# Patient Record
Sex: FEMALE | Race: BLACK OR AFRICAN AMERICAN | Employment: UNEMPLOYED | URBAN - METROPOLITAN AREA
[De-identification: names, ages, dates, MRNs, and addresses within clinical notes are randomized per-mention and may not be internally consistent; named-entity substitution may affect disease eponyms.]

---

## 2017-02-15 ENCOUNTER — OFFICE VISIT (OUTPATIENT)
Dept: INTERNAL MEDICINE CLINIC | Age: 52
End: 2017-02-15

## 2017-02-15 ENCOUNTER — HOSPITAL ENCOUNTER (OUTPATIENT)
Dept: LAB | Age: 52
Discharge: HOME | End: 2017-02-15
Payer: MEDICARE

## 2017-02-15 VITALS
HEIGHT: 67 IN | WEIGHT: 185 LBS | HEART RATE: 70 BPM | TEMPERATURE: 98.8 F | OXYGEN SATURATION: 98 % | DIASTOLIC BLOOD PRESSURE: 79 MMHG | RESPIRATION RATE: 18 BRPM | BODY MASS INDEX: 29.03 KG/M2 | SYSTOLIC BLOOD PRESSURE: 115 MMHG

## 2017-02-15 DIAGNOSIS — E11.9 TYPE 2 DIABETES MELLITUS WITHOUT COMPLICATION, WITHOUT LONG-TERM CURRENT USE OF INSULIN (HCC): ICD-10-CM

## 2017-02-15 DIAGNOSIS — F17.200 SMOKER: ICD-10-CM

## 2017-02-15 DIAGNOSIS — Z00.00 MEDICARE ANNUAL WELLNESS VISIT, INITIAL: ICD-10-CM

## 2017-02-15 DIAGNOSIS — R05.9 COUGH: ICD-10-CM

## 2017-02-15 DIAGNOSIS — I10 ESSENTIAL HYPERTENSION, BENIGN: ICD-10-CM

## 2017-02-15 DIAGNOSIS — E11.9 TYPE 2 DIABETES MELLITUS WITHOUT COMPLICATION, WITHOUT LONG-TERM CURRENT USE OF INSULIN (HCC): Primary | ICD-10-CM

## 2017-02-15 LAB
ALBUMIN SERPL BCP-MCNC: 3.5 G/DL (ref 3.4–5)
ALBUMIN/GLOB SERPL: 0.8 {RATIO} (ref 0.8–1.7)
ALP SERPL-CCNC: 82 U/L (ref 45–117)
ALT SERPL-CCNC: 37 U/L (ref 13–56)
ANION GAP BLD CALC-SCNC: 7 MMOL/L (ref 3–18)
AST SERPL W P-5'-P-CCNC: 28 U/L (ref 15–37)
BILIRUB SERPL-MCNC: 0.4 MG/DL (ref 0.2–1)
BUN SERPL-MCNC: 9 MG/DL (ref 7–18)
BUN/CREAT SERPL: 11 (ref 12–20)
CALCIUM SERPL-MCNC: 8.5 MG/DL (ref 8.5–10.1)
CHLORIDE SERPL-SCNC: 101 MMOL/L (ref 100–108)
CHOLEST SERPL-MCNC: 175 MG/DL
CO2 SERPL-SCNC: 32 MMOL/L (ref 21–32)
CREAT SERPL-MCNC: 0.81 MG/DL (ref 0.6–1.3)
EST. AVERAGE GLUCOSE BLD GHB EST-MCNC: 148 MG/DL
GLOBULIN SER CALC-MCNC: 4.3 G/DL (ref 2–4)
GLUCOSE SERPL-MCNC: 70 MG/DL (ref 74–99)
HBA1C MFR BLD: 6.8 % (ref 4.2–5.6)
HDLC SERPL-MCNC: 61 MG/DL (ref 40–60)
HDLC SERPL: 2.9 {RATIO} (ref 0–5)
LDLC SERPL CALC-MCNC: 93.6 MG/DL (ref 0–100)
LIPID PROFILE,FLP: ABNORMAL
POTASSIUM SERPL-SCNC: 3.6 MMOL/L (ref 3.5–5.5)
PROT SERPL-MCNC: 7.8 G/DL (ref 6.4–8.2)
SODIUM SERPL-SCNC: 140 MMOL/L (ref 136–145)
TRIGL SERPL-MCNC: 102 MG/DL (ref ?–150)
VLDLC SERPL CALC-MCNC: 20.4 MG/DL

## 2017-02-15 PROCEDURE — 80053 COMPREHEN METABOLIC PANEL: CPT | Performed by: PHYSICIAN ASSISTANT

## 2017-02-15 PROCEDURE — 80061 LIPID PANEL: CPT | Performed by: PHYSICIAN ASSISTANT

## 2017-02-15 PROCEDURE — 83036 HEMOGLOBIN GLYCOSYLATED A1C: CPT | Performed by: PHYSICIAN ASSISTANT

## 2017-02-15 RX ORDER — MONTELUKAST SODIUM 10 MG/1
TABLET ORAL
Qty: 90 TAB | Refills: 1 | Status: SHIPPED | OUTPATIENT
Start: 2017-02-15 | End: 2017-05-15 | Stop reason: SDUPTHER

## 2017-02-15 RX ORDER — VARENICLINE TARTRATE 0.5 MG/1
0.5 TABLET, FILM COATED ORAL 2 TIMES DAILY
Qty: 60 TAB | Refills: 2 | Status: CANCELLED | OUTPATIENT
Start: 2017-02-15 | End: 2017-05-16

## 2017-02-15 RX ORDER — HYDROCHLOROTHIAZIDE 25 MG/1
TABLET ORAL
Qty: 90 TAB | Refills: 1 | Status: SHIPPED | OUTPATIENT
Start: 2017-02-15 | End: 2017-08-14 | Stop reason: SDUPTHER

## 2017-02-15 RX ORDER — VARENICLINE TARTRATE 1 MG/1
TABLET, FILM COATED ORAL
Qty: 60 TAB | Refills: 1 | Status: SHIPPED | OUTPATIENT
Start: 2017-02-15 | End: 2017-05-15

## 2017-02-15 RX ORDER — LISINOPRIL 5 MG/1
5 TABLET ORAL DAILY
Qty: 90 TAB | Refills: 1 | Status: SHIPPED | OUTPATIENT
Start: 2017-02-15 | End: 2017-08-14 | Stop reason: SDUPTHER

## 2017-02-15 RX ORDER — ALBUTEROL SULFATE 90 UG/1
2 AEROSOL, METERED RESPIRATORY (INHALATION)
Qty: 1 INHALER | Refills: 5 | Status: SHIPPED | OUTPATIENT
Start: 2017-02-15 | End: 2017-08-14 | Stop reason: SDUPTHER

## 2017-02-15 RX ORDER — ESTRADIOL 0.1 MG/G
CREAM VAGINAL
Qty: 42.5 G | Refills: 1 | Status: SHIPPED | OUTPATIENT
Start: 2017-02-15 | End: 2017-03-27 | Stop reason: SDUPTHER

## 2017-02-15 NOTE — PROGRESS NOTES
Chief Complaint   Patient presents with    Annual Wellness Visit    Diabetes    Hypertension   1. Have you been to the ER, urgent care clinic since your last visit? Hospitalized since your last visit? No    2. Have you seen or consulted any other health care providers outside of the [address] [place] [address] since your last visit? Include any pap smears or colon screening.  No

## 2017-02-15 NOTE — PROGRESS NOTES
Ravinder Vitale is a 46 y.o. female and presents for annual Medicare Wellness Visit. Problem List: Reviewed with patient and discussed risk factors. Patient Active Problem List   Diagnosis Code    Essential hypertension, benign I10    Overactive bladder N32.81    Bipolar disorder ([facility] F31.9    Chronic back pain M54.9, G89.29    Glaucoma H40.9    UTI (lower urinary tract infection) N39.0    Diabetes mellitus ([facility] E11.9    Psychotic disorder F29    Hypertension I10    Hepatitis C B19.20    GERD (gastroesophageal reflux disease) K21.9    Diabetes (HCC) E11.9    Depression F32.9    Chronic pain G89.29    Asthma J45.909       Current medical providers:  Patient Care Team:  Tom Lorenz PA-C as PCP - General (Family Practice)  Emanuel Montez RN as Nurse Navigator    PSH: Reviewed with patient  Past Surgical History   Procedure Laterality Date    Hx orthopaedic  2008     [id number]    Hx cholecystectomy      Hx orthopaedic  3/2015     right foot decompression peroneal nerve        SH: Reviewed with patient  Social History   Substance Use Topics    Smoking status: Former Smoker     Packs/day: 1.00     Years: 35.00     Quit date: 4/1/2013    Smokeless tobacco: Never Used    Alcohol use No       FH: Reviewed with patient  Family History   Problem Relation Age of Onset    Cancer Mother     Lung Disease Mother        Medications/Allergies: Reviewed with patient  Current Outpatient Prescriptions on File Prior to Visit   Medication Sig Dispense Refill    fluticasone-vilanterol (BREO ELLIPTA) 100-25 mcg/dose inhaler INHALE ONE PUFF BY MOUTH DAILY 1 Inhaler 5    ABILIFY 30 mg tablet       Lancets misc True results lancets  Test glucose daily 1 Each 11    glucose blood VI test strips (BLOOD GLUCOSE TEST) strip True result  Test blood glucose daily 1 Each 11    escitalopram (LEXAPRO) 20 mg tablet Take 20 mg by mouth daily.         gabapentin (NEURONTIN) 600 mg tablet Take  by mouth two (2) times a day.  diazepam (VALIUM) 10 mg tablet Take 10 mg by mouth every six (6) hours as needed.  tapentadol (NUCYNTA) 100 mg tablet Take 100 mg by mouth every six (6) hours as needed. No current facility-administered medications on file prior to visit. Allergies   Allergen Reactions    Aspirin Hives    Bactrim [Sulfamethoprim Ds] Swelling       Objective:  Visit Vitals    /79 (BP 1 Location: Left arm, BP Patient Position: Sitting)    Pulse 70    Temp 98.8 °F (37.1 °C) (Oral)    Resp 18    Ht 5' 7\" (1.702 m)    Wt 185 lb (83.9 kg)    SpO2 98%    BMI 28.98 kg/m2    Body mass index is 28.98 kg/(m^2). Assessment of cognitive impairment: Alert and oriented x 3    Depression Screen: No flowsheet data found. Fall Risk Assessment:    Fall Risk Assessment, last 12 mths 9/10/2015   Able to walk? Yes   Fall in past 12 months? No       Functional Ability:   Does the patient exhibit a steady gait? yes   How long did it take the patient to get up and walk from a sitting position? <10 sec   Is the patient self reliant?  (ie can do own laundry, meals, household chores)  yes     Does the patient handle his/her own medications? yes     Does the patient handle his/her own money? yes     Is the patients home safe (ie good lighting, handrails on stairs and bath, etc.)? yes     Did you notice or did patient express any hearing difficulties? no     Did you notice or did patient express any vision difficulties?   no     Were distance and reading eye charts used? yes       Advance Care Planning:   Patient was offered the opportunity to discuss advance care planning:  yes     Does patient have an Advance Directive:  no   If no, did you provide information on Caring Connections?   yes       Plan:      Orders Placed This Encounter    LIPID PANEL    METABOLIC PANEL, COMPREHENSIVE    HEMOGLOBIN A1C WITH EAG    OR COLLECTION VENOUS BLOOD,VENIPUNCTURE    hydroCHLOROthiazide (HYDRODIURIL) 25 mg tablet    montelukast (SINGULAIR) 10 mg tablet    estradiol (ESTRACE) 0.01 % (0.1 mg/gram) vaginal cream    albuterol (PROVENTIL HFA, VENTOLIN HFA, PROAIR HFA) 90 mcg/actuation inhaler    lisinopril (PRINIVIL, ZESTRIL) 5 mg tablet    SITagliptin-metFORMIN (JANUMET)  mg per tablet    varenicline (CHANTIX CONTINUING MONTH SUZANNA) 1 mg tablet       Health Maintenance   Topic Date Due    MICROALBUMIN Q1  04/14/2017    EYE EXAM RETINAL OR DILATED Q1  04/26/2017    HEMOGLOBIN A1C Q6M  05/14/2017    LIPID PANEL Q1  11/14/2017    FOOT EXAM Q1  02/15/2018    BREAST CANCER SCRN MAMMOGRAM  09/26/2018    PAP AKA CERVICAL CYTOLOGY  05/19/2019    COLONOSCOPY  05/14/2022    DTaP/Tdap/Td series (2 - Td) 07/14/2026    Pneumococcal 19-64 Medium Risk  Completed    INFLUENZA AGE 9 TO ADULT  Completed       *Patient verbalized understanding and agreement with the plan. A copy of the After Visit Summary with personalized health plan was given to the patient today.

## 2017-02-15 NOTE — MR AVS SNAPSHOT
Visit Information Date & Time Provider Department Dept. Phone Encounter #  
 2/15/2017  9:45 AM Steven Kaplan PA-C [facility] [phone] Your Appointments 3/27/2017  3:30 PM  
ESTABLISHED PATIENT with Katia Medina MD  
[facility] [address]) Appt Note: F/U 6 M FOR UTI EVAL  
 [address]  
  
   
 [address], [address] [place] [address] 5/15/2017 10:00 AM  
Office Visit with Steven Kaplan PA-C [facility] [address]) Appt Note: combo clinic  
 [phone] [facility] [address]  
[phone]  
  
   
 [address] [phone] [facility] [address] Upcoming Health Maintenance Date Due MICROALBUMIN Q1 4/14/2017 EYE EXAM RETINAL OR DILATED Q1 4/26/2017 HEMOGLOBIN A1C Q6M 5/14/2017 LIPID PANEL Q1 11/14/2017 FOOT EXAM Q1 2/15/2018 BREAST CANCER SCRN MAMMOGRAM 9/26/2018 PAP AKA CERVICAL CYTOLOGY 5/19/2019 COLONOSCOPY 5/14/2022 DTaP/Tdap/Td series (2 - Td) 7/14/2026 Allergies as of 2/15/2017  Review Complete On: 2/15/2017 By: Steven Kaplan PA-C Severity Noted Reaction Type Reactions Aspirin  10/25/2012    Hives Bactrim [Sulfamethoprim Ds]  10/25/2012    Swelling Current Immunizations  Never Reviewed Name Date Influenza Vaccine 9/10/2015, 10/16/2014, 11/1/2013 Influenza Vaccine (Quad) PF 11/14/2016 Pneumococcal Polysaccharide (PPSV-23) 11/1/2013 Td, Adsorbed PF 9/10/2015 Zoster Vaccine, Live 7/1/2015 Not reviewed this visit You Were Diagnosed With   
  
 Codes Comments Type 2 diabetes mellitus without complication, without long-term current use of insulin (HCC)    -  Primary ICD-10-CM: E11.9 ICD-9-CM: 250.00 Essential hypertension, benign     ICD-10-CM: I10 
ICD-9-CM: 401.1 Cough     ICD-10-CM: R05 ICD-9-CM: 786.2 Smoker     ICD-10-CM: V07.874 ICD-9-CM: 305.1 Vitals BP Pulse Temp Resp Height(growth percentile) Weight(growth percentile) 115/79 (BP 1 Location: Left arm, BP Patient Position: Sitting) 70 98.8 °F (37.1 °C) (Oral) 18 5' 7\" (1.702 m) 185 lb (83.9 kg) SpO2 BMI OB Status Smoking Status 98% 28.98 kg/m2 Postmenopausal Former Smoker BMI and BSA Data Body Mass Index Body Surface Area  
 28.98 kg/m 2 1.99 m 2 Preferred Pharmacy Pharmacy Name Phone [address] [facility], [address] [place] [address] [phone] Your Updated Medication List  
  
   
This list is accurate as of: 2/15/17  4:09 PM.  Always use your most recent med list.  
  
  
  
  
 ABILIFY 30 mg tablet Generic drug:  ARIPiprazole  
  
 albuterol 90 mcg/actuation inhaler Commonly known as:  PROVENTIL HFA, VENTOLIN HFA, PROAIR HFA Take 2 Puffs by inhalation every six (6) hours as needed for Wheezing. diazePAM 10 mg tablet Commonly known as:  VALIUM Take 10 mg by mouth every six (6) hours as needed. estradiol 0.01 % (0.1 mg/gram) vaginal cream  
Commonly known as:  ESTRACE  
1 finger tip of medication applied to vulva daily  
  
 fluticasone-vilanterol 100-25 mcg/dose inhaler Commonly known as:  BREO ELLIPTA INHALE ONE PUFF BY MOUTH DAILY  
  
 gabapentin 600 mg tablet Commonly known as:  NEURONTIN Take  by mouth two (2) times a day. glucose blood VI test strips strip Commonly known as:  blood glucose test  
True result Test blood glucose daily  
  
 hydroCHLOROthiazide 25 mg tablet Commonly known as:  HYDRODIURIL  
TAKE 1 TABLET BY MOUTH DAILY Lancets Misc True results lancets Test glucose daily LEXAPRO 20 mg tablet Generic drug:  escitalopram oxalate Take 20 mg by mouth daily. lisinopril 5 mg tablet Commonly known as:  Emily Reasons  
 Take 1 Tab by mouth daily. montelukast 10 mg tablet Commonly known as:  SINGULAIR  
TAKE 1 TABLET BY MOUTH DAILY  
  
 NUCYNTA 100 mg tablet Generic drug:  tapentadol Take 100 mg by mouth every six (6) hours as needed. SITagliptin-metFORMIN  mg per tablet Commonly known as:  Gadiel Nieves Take 1 Tab by mouth two (2) times daily (with meals). varenicline 1 mg tablet Commonly known as:  CHANTIX CONTINUING MONTH SUZANNA  
1 tab po bid Prescriptions Sent to Pharmacy Refills  
 hydroCHLOROthiazide (HYDRODIURIL) 25 mg tablet 1 Sig: TAKE 1 TABLET BY MOUTH DAILY Class: Normal  
 Pharmacy: [facility] [address] [place] [address] Ph #: [phone]  
 montelukast (SINGULAIR) 10 mg tablet 1 Sig: TAKE 1 TABLET BY MOUTH DAILY Class: Normal  
 Pharmacy: [facility] [address] [place] [address] Ph #: [phone]  
 estradiol (ESTRACE) 0.01 % (0.1 mg/gram) vaginal cream 1 Si finger tip of medication applied to vulva daily Class: Normal  
 Pharmacy: [facility] [address] [place] [address] Ph #: [phone]  
 albuterol (PROVENTIL HFA, VENTOLIN HFA, PROAIR HFA) 90 mcg/actuation inhaler 5 Sig: Take 2 Puffs by inhalation every six (6) hours as needed for Wheezing. Class: Normal  
 Pharmacy: [facility] [address] [place] [address] Ph #: [phone] Route: Inhalation  
 lisinopril (PRINIVIL, ZESTRIL) 5 mg tablet 1 Sig: Take 1 Tab by mouth daily. Class: Normal  
 Pharmacy: [facility] [place] [address], [address] [place] [address]. [place] [address] Ph #: [phone]  Route: Oral  
 SITagliptin-metFORMIN (JANUMET)  mg per tablet 1  
 Sig: Take 1 Tab by mouth two (2) times daily (with meals). Class: Normal  
 Pharmacy: [facility] [address]  Ph #: [phone] Route: Oral  
 varenicline (CHANTIX CONTINUING MONTH SUZANNA) 1 mg tablet 1 Si tab po bid Class: Normal  
 Pharmacy: [facility] [address], [address] [place]  Ph #: [phone] We Performed the Following [place] COLLECTION VENOUS BLOOD,VENIPUNCTURE [id number] CPT(R)] Introducing [facility] SERVICES! Ruth Willis introduces [employer] patient portal. Now you can access parts of your medical record, email your doctor's office, and request medication refills online. 1. In your internet browser, go to [email or web address] 2. Click on the First Time User? Click Here link in the Sign In box. You will see the New Member Sign Up page. 3. Enter your [employer] Access Code exactly as it appears below. You will not need to use this code after youve completed the sign-up process. If you do not sign up before the expiration date, you must request a new code. · [employer] Access Code: [email or web address] Expires: 2017  4:06 PM 
 
4. Enter the last four digits of your Social Security Number (xxxx) and Date of Birth (mm/dd/yyyy) as indicated and click Submit. You will be taken to the next sign-up page. 5. Create a [employer] ID. This will be your [employer] login ID and cannot be changed, so think of one that is secure and easy to remember. 6. Create a [employer] password. You can change your password at any time. 7. Enter your Password Reset Question and Answer. This can be used at a later time if you forget your password. 8. Enter your e-mail address. You will receive e-mail notification when new information is available in [address] 9. Click Sign Up. You can now view and download portions of your medical record. 10. Click the Download Summary menu link to download a portable copy of your medical information. If you have questions, please visit the Frequently Asked Questions section of the [employer] website. Remember, [employer] is NOT to be used for urgent needs. For medical emergencies, dial 911. Now available from your iPhone and Android! Please provide this summary of care documentation to your next provider. Your primary care clinician is listed as Sue Miller. If you have any questions after today's visit, please call [phone].

## 2017-02-15 NOTE — PROGRESS NOTES
HISTORY OF PRESENT ILLNESS  Marlene Roach is a 46 y.o. female. HPI Marlene Roach is here for follow up on diabetes, HTN and hypercholesterolemia. She does check her sugars and brought a log with her. She denies numbness and tingling of her feet, shortness of breath or chest pain. She has a podiatrist and ophthalmologist .   She did see pulmonology and was advised to quit smoking. She did complete the chantix starter pack a few months ago. She has reduced her smoking, but continues to smoke. She would like the continuing month pack. She states she was told she does not have COPD or emphysema. Review of Systems   Constitutional: Negative. HENT: Negative. Eyes: Negative. Musculoskeletal: Positive for joint pain (right hip, has appt with ortho in early March). Neurological: Negative for tingling. Physical Exam   Constitutional: She is oriented to person, place, and time. She appears well-developed and well-nourished. No distress. HENT:   Head: Normocephalic and atraumatic. Cardiovascular: Normal rate and regular rhythm. No murmur heard. Pulmonary/Chest: Effort normal and breath sounds normal. She has no wheezes. Musculoskeletal: She exhibits no edema. Neurological: She is alert and oriented to person, place, and time. Visit Vitals    /79 (BP 1 Location: Left arm, BP Patient Position: Sitting)    Pulse 70    Temp 98.8 °F (37.1 °C) (Oral)    Resp 18    Ht 5' 7\" (1.702 m)    Wt 185 lb (83.9 kg)    SpO2 98%    BMI 28.98 kg/m2     Wt Readings from Last 3 Encounters:   02/15/17 185 lb (83.9 kg)   11/14/16 183 lb (83 kg)   09/29/16 185 lb (83.9 kg)           ASSESSMENT and PLAN    ICD-10-CM ICD-9-CM    1.  Type 2 diabetes mellitus without complication, without long-term current use of insulin ([facility]) E11.9 250.00 SITagliptin-metFORMIN (JANUMET)  mg per tablet      ME COLLECTION VENOUS BLOOD,VENIPUNCTURE      LIPID PANEL      METABOLIC PANEL, COMPREHENSIVE HEMOGLOBIN A1C WITH EAG   2. Essential hypertension, benign I10 401.1 hydroCHLOROthiazide (HYDRODIURIL) 25 mg tablet      lisinopril (PRINIVIL, ZESTRIL) 5 mg tablet      OR COLLECTION VENOUS BLOOD,VENIPUNCTURE      LIPID PANEL      METABOLIC PANEL, COMPREHENSIVE   3. Cough R05 786.2 montelukast (SINGULAIR) 10 mg tablet      albuterol (PROVENTIL HFA, VENTOLIN HFA, PROAIR HFA) 90 mcg/actuation inhaler   4. Smoker F17.200 305.1 varenicline (CHANTIX CONTINUING MONTH SUZANNA) 1 mg tablet     Pt verbalized understanding of their condition and diagnoses, treatment plan,  as well as side effects of any new medications prescribed.

## 2017-03-27 PROBLEM — N39.0 RECURRENT UTI: Status: ACTIVE | Noted: 2017-03-27

## 2017-04-19 ENCOUNTER — TELEPHONE (OUTPATIENT)
Dept: INTERNAL MEDICINE CLINIC | Age: 52
End: 2017-04-19

## 2017-04-19 NOTE — TELEPHONE ENCOUNTER
Mrs Shayan Leon called to see if it would be ok for her to take [employer] supplement for Hail, Nails & Skin. She said her hair is thinning, nails are splitting and the skin/cuticles are dry and cracking. She just wants to make sure this will work with her medications she is currently taking.

## 2017-04-21 ENCOUNTER — TELEPHONE (OUTPATIENT)
Dept: INTERNAL MEDICINE CLINIC | Age: 52
End: 2017-04-21

## 2017-05-15 ENCOUNTER — TELEPHONE (OUTPATIENT)
Dept: INTERNAL MEDICINE CLINIC | Age: 52
End: 2017-05-15

## 2017-05-15 ENCOUNTER — HOSPITAL ENCOUNTER (OUTPATIENT)
Dept: LAB | Age: 52
Discharge: HOME | End: 2017-05-15
Payer: MEDICARE

## 2017-05-15 ENCOUNTER — OFFICE VISIT (OUTPATIENT)
Dept: INTERNAL MEDICINE CLINIC | Age: 52
End: 2017-05-15

## 2017-05-15 VITALS
WEIGHT: 187 LBS | HEART RATE: 72 BPM | RESPIRATION RATE: 18 BRPM | HEIGHT: 67 IN | OXYGEN SATURATION: 97 % | DIASTOLIC BLOOD PRESSURE: 81 MMHG | TEMPERATURE: 97.9 F | BODY MASS INDEX: 29.35 KG/M2 | SYSTOLIC BLOOD PRESSURE: 113 MMHG

## 2017-05-15 DIAGNOSIS — E87.6 DIURETIC-INDUCED HYPOKALEMIA: Primary | ICD-10-CM

## 2017-05-15 DIAGNOSIS — I10 ESSENTIAL HYPERTENSION, BENIGN: ICD-10-CM

## 2017-05-15 DIAGNOSIS — R05.9 COUGH: ICD-10-CM

## 2017-05-15 DIAGNOSIS — T50.2X5A DIURETIC-INDUCED HYPOKALEMIA: Primary | ICD-10-CM

## 2017-05-15 DIAGNOSIS — G89.29 CHRONIC PAIN OF RIGHT KNEE: ICD-10-CM

## 2017-05-15 DIAGNOSIS — E11.9 TYPE 2 DIABETES MELLITUS WITHOUT COMPLICATION, WITHOUT LONG-TERM CURRENT USE OF INSULIN (HCC): ICD-10-CM

## 2017-05-15 DIAGNOSIS — E11.9 TYPE 2 DIABETES MELLITUS WITHOUT COMPLICATION, WITHOUT LONG-TERM CURRENT USE OF INSULIN (HCC): Primary | ICD-10-CM

## 2017-05-15 DIAGNOSIS — R82.998 LEUKOCYTES IN URINE: ICD-10-CM

## 2017-05-15 DIAGNOSIS — M25.561 CHRONIC PAIN OF RIGHT KNEE: ICD-10-CM

## 2017-05-15 LAB
ALBUMIN SERPL BCP-MCNC: 3.5 G/DL (ref 3.4–5)
ALBUMIN/GLOB SERPL: 0.8 {RATIO} (ref 0.8–1.7)
ALP SERPL-CCNC: 82 U/L (ref 45–117)
ALT SERPL-CCNC: 29 U/L (ref 13–56)
ANION GAP BLD CALC-SCNC: 7 MMOL/L (ref 3–18)
AST SERPL W P-5'-P-CCNC: 29 U/L (ref 15–37)
BILIRUB SERPL-MCNC: 0.4 MG/DL (ref 0.2–1)
BILIRUB UR QL STRIP: NEGATIVE
BUN SERPL-MCNC: 8 MG/DL (ref 7–18)
BUN/CREAT SERPL: 10 (ref 12–20)
CALCIUM SERPL-MCNC: 9.1 MG/DL (ref 8.5–10.1)
CHLORIDE SERPL-SCNC: 101 MMOL/L (ref 100–108)
CHOLEST SERPL-MCNC: 168 MG/DL
CO2 SERPL-SCNC: 30 MMOL/L (ref 21–32)
CREAT SERPL-MCNC: 0.81 MG/DL (ref 0.6–1.3)
CREATININE(CRT),U, 723280: 100 MG/DL
EST. AVERAGE GLUCOSE BLD GHB EST-MCNC: 154 MG/DL
GLOBULIN SER CALC-MCNC: 4.4 G/DL (ref 2–4)
GLUCOSE SERPL-MCNC: 85 MG/DL (ref 74–99)
GLUCOSE UR-MCNC: NORMAL MG/DL
HBA1C MFR BLD: 7 % (ref 4.2–5.6)
HDLC SERPL-MCNC: 55 MG/DL (ref 40–60)
HDLC SERPL: 3.1 {RATIO} (ref 0–5)
KETONES P FAST UR STRIP-MCNC: NEGATIVE MG/DL
LDLC SERPL CALC-MCNC: 93 MG/DL (ref 0–100)
LIPID PROFILE,FLP: NORMAL
MICROALBUMIN UR TEST STR-MCNC: 10 MG/L
MICROALBUMIN/CREAT RATIO POC: <30 MG/G
PH UR STRIP: 7 [PH] (ref 4.6–8)
POTASSIUM SERPL-SCNC: 3.3 MMOL/L (ref 3.5–5.5)
PROT SERPL-MCNC: 7.9 G/DL (ref 6.4–8.2)
PROT UR QL STRIP: NEGATIVE MG/DL
SODIUM SERPL-SCNC: 138 MMOL/L (ref 136–145)
SP GR UR STRIP: 1.02 (ref 1–1.03)
TRIGL SERPL-MCNC: 100 MG/DL (ref ?–150)
UA UROBILINOGEN AMB POC: NORMAL (ref 0.2–1)
URINALYSIS CLARITY POC: CLEAR
URINALYSIS COLOR POC: YELLOW
URINE BLOOD POC: NEGATIVE
URINE LEUKOCYTES POC: NEGATIVE
URINE NITRITES POC: POSITIVE
VLDLC SERPL CALC-MCNC: 20 MG/DL

## 2017-05-15 PROCEDURE — 83036 HEMOGLOBIN GLYCOSYLATED A1C: CPT | Performed by: PHYSICIAN ASSISTANT

## 2017-05-15 PROCEDURE — 87077 CULTURE AEROBIC IDENTIFY: CPT | Performed by: PHYSICIAN ASSISTANT

## 2017-05-15 PROCEDURE — 87086 URINE CULTURE/COLONY COUNT: CPT | Performed by: PHYSICIAN ASSISTANT

## 2017-05-15 PROCEDURE — 87186 SC STD MICRODIL/AGAR DIL: CPT | Performed by: PHYSICIAN ASSISTANT

## 2017-05-15 PROCEDURE — 80061 LIPID PANEL: CPT | Performed by: PHYSICIAN ASSISTANT

## 2017-05-15 PROCEDURE — 80053 COMPREHEN METABOLIC PANEL: CPT | Performed by: PHYSICIAN ASSISTANT

## 2017-05-15 RX ORDER — POTASSIUM CHLORIDE 750 MG/1
10 TABLET, EXTENDED RELEASE ORAL DAILY
Qty: 90 TAB | Refills: 1 | Status: SHIPPED | OUTPATIENT
Start: 2017-05-15 | End: 2017-08-14 | Stop reason: SDUPTHER

## 2017-05-15 RX ORDER — MONTELUKAST SODIUM 10 MG/1
TABLET ORAL
Qty: 90 TAB | Refills: 1 | Status: SHIPPED | OUTPATIENT
Start: 2017-05-15 | End: 2017-08-14 | Stop reason: SDUPTHER

## 2017-05-15 NOTE — PROGRESS NOTES
Brett Ivory presents today at the clinic for     Chief Complaint   Patient presents with    Diabetes    Labs    Hypertension        Wt Readings from Last 3 Encounters:   05/15/17 187 lb (84.8 kg)   03/27/17 185 lb (83.9 kg)   02/15/17 185 lb (83.9 kg)     Temp Readings from Last 3 Encounters:   05/15/17 97.9 °F (36.6 °C) (Oral)   04/27/17 98 °F (36.7 °C)   02/15/17 98.8 °F (37.1 °C) (Oral)     BP Readings from Last 3 Encounters:   05/15/17 113/81   04/27/17 116/77   03/27/17 130/80     Pulse Readings from Last 3 Encounters:   05/15/17 72   04/27/17 74   02/15/17 70       There are no preventive care reminders to display for this patient. Coordination of Care:    1. Have you been to the ER, urgent care clinic since your last visit? Hospitalized since your last visit? No    2. Have you seen or consulted any other health care providers outside of the [facility] since your last visit? Include any pap smears or colon screening.  No

## 2017-05-15 NOTE — MR AVS SNAPSHOT
Visit Information Date & Time Provider Department Dept. Phone Encounter #  
 5/15/2017 10:00 AM Og Friedman PA-C [facility] [phone] [id number] Follow-up Instructions Return in about 3 months (around 8/15/2017) for Combo. Your Appointments 3/27/2018  1:20 PM  
ESTABLISHED PATIENT with THEO Matthews [facility] ([address]) Appt Note: Return for PA f/u in 1 year. [address] [facility]  
  
   
 [address] [facility], [address] [place] [address] Upcoming Health Maintenance Date Due  
 EYE EXAM RETINAL OR DILATED Q1 5/31/2017* INFLUENZA AGE 9 TO ADULT 8/1/2017 HEMOGLOBIN A1C Q6M 8/15/2017 FOOT EXAM Q1 2/15/2018 LIPID PANEL Q1 2/15/2018 MEDICARE YEARLY EXAM 2/16/2018 MICROALBUMIN Q1 5/15/2018 BREAST CANCER SCRN MAMMOGRAM 9/26/2018 PAP AKA CERVICAL CYTOLOGY 5/19/2019 COLONOSCOPY 5/14/2022 DTaP/Tdap/Td series (2 - Td) 7/14/2026 *Topic was postponed. The date shown is not the original due date. Allergies as of 5/15/2017  Review Complete On: 5/15/2017 By: Og Friedman PA-C Severity Noted Reaction Type Reactions Aspirin  10/25/2012    Hives Bactrim [Sulfamethoprim Ds]  10/25/2012    Swelling Current Immunizations  Never Reviewed Name Date Influenza Vaccine 9/10/2015, 10/16/2014, 11/1/2013 Influenza Vaccine (Quad) PF 11/14/2016 Pneumococcal Polysaccharide (PPSV-23) 11/1/2013 Td, Adsorbed PF 9/10/2015 Zoster Vaccine, Live 7/1/2015 Not reviewed this visit You Were Diagnosed With   
  
 Codes Comments Type 2 diabetes mellitus without complication, without long-term current use of insulin (HCC)    -  Primary ICD-10-CM: E11.9 ICD-9-CM: 250.00 Essential hypertension, benign     ICD-10-CM: I10 
ICD-9-CM: 401.1 Cough     ICD-10-CM: R05 ICD-9-CM: 452. 2 Vitals BP Pulse Temp Resp Height(growth percentile) Weight(growth percentile) 113/81 (BP 1 Location: Left arm, BP Patient Position: Sitting) 72 97.9 °F (36.6 °C) (Oral) 18 5' 7\" (1.702 m) 187 lb (84.8 kg) SpO2 BMI OB Status Smoking Status 97% 29.29 kg/m2 Postmenopausal Former Smoker Vitals History BMI and BSA Data Body Mass Index Body Surface Area  
 29.29 kg/m 2 2 m 2 Preferred Pharmacy Pharmacy Name Phone [address], [address] [place], [place] [address] [phone] Your Updated Medication List  
  
   
This list is accurate as of: 5/15/17 10:04 AM.  Always use your most recent med list.  
  
  
  
  
 ABILIFY 30 mg tablet Generic drug:  ARIPiprazole  
  
 albuterol 90 mcg/actuation inhaler Commonly known as:  PROVENTIL HFA, VENTOLIN HFA, PROAIR HFA Take 2 Puffs by inhalation every six (6) hours as needed for Wheezing. diazePAM 10 mg tablet Commonly known as:  VALIUM Take 10 mg by mouth every six (6) hours as needed. estradiol 0.01 % (0.1 mg/gram) vaginal cream  
Commonly known as:  ESTRACE Apply fingertip of estrogen cream to vaginal area daily  
  
 fluticasone-vilanterol 100-25 mcg/dose inhaler Commonly known as:  BREO ELLIPTA INHALE ONE PUFF BY MOUTH DAILY  
  
 gabapentin 600 mg tablet Commonly known as:  NEURONTIN Take  by mouth two (2) times a day. glucose blood VI test strips strip Commonly known as:  blood glucose test  
True result Test blood glucose daily  
  
 hydroCHLOROthiazide 25 mg tablet Commonly known as:  HYDRODIURIL  
TAKE 1 TABLET BY MOUTH DAILY Lancets Misc True results lancets Test glucose daily LEXAPRO 20 mg tablet Generic drug:  escitalopram oxalate Take 20 mg by mouth daily. lisinopril 5 mg tablet Commonly known as:  Marge Gear Take 1 Tab by mouth daily. montelukast 10 mg tablet Commonly known as:  SINGULAIR  
TAKE 1 TABLET BY MOUTH DAILY  
  
 NUCYNTA 100 mg tablet Generic drug:  tapentadol Take 100 mg by mouth every six (6) hours as needed. SITagliptin-metFORMIN  mg per tablet Commonly known as:  Hansa [place] [employer] Take 1 Tab by mouth two (2) times daily (with meals). Prescriptions Sent to Pharmacy Refills  
 montelukast (SINGULAIR) 10 mg tablet 1 Sig: TAKE 1 TABLET BY MOUTH DAILY Class: Normal  
 Pharmacy: [facility], [address] #: [phone] We Performed the Following AMB POC URINALYSIS DIP STICK AUTO W/O MICRO [35066 CPT(R)] AMB POC URINE, MICROALBUMIN, SEMIQUANTITATIVE [30232 CPT(R)] NV COLLECTION VENOUS BLOOD,VENIPUNCTURE [id number] CPT(R)] Follow-up Instructions Return in about 3 months (around 8/15/2017) for Combo. To-Do List   
 05/15/2017 Lab:  HEMOGLOBIN A1C WITH EAG   
  
 05/15/2017 Lab:  LIPID PANEL   
  
 05/15/2017 Lab:  METABOLIC PANEL, COMPREHENSIVE Introducing [facility]! [facility] introduces [employer] patient portal. Now you can access parts of your medical record, email your doctor's office, and request medication refills online. 1. In your internet browser, go to [email or web address] 2. Click on the First Time User? Click Here link in the Sign In box. You will see the New Member Sign Up page. 3. Enter your [employer] Access Code exactly as it appears below. You will not need to use this code after youve completed the sign-up process. If you do not sign up before the expiration date, you must request a new code. · [employer] Access Code: [email or web address] Expires: 5/16/2017  5:06 PM 
 
4. Enter the last four digits of your Social Security Number (xxxx) and Date of Birth (mm/dd/yyyy) as indicated and click Submit. You will be taken to the next sign-up page. 5. Create a [employer] ID. This will be your [employer] login ID and cannot be changed, so think of one that is secure and easy to remember. 6. Create a [employer] password. You can change your password at any time. 7. Enter your Password Reset Question and Answer. This can be used at a later time if you forget your password. 8. Enter your e-mail address. You will receive e-mail notification when new information is available in [address] 9. Click Sign Up. You can now view and download portions of your medical record. 10. Click the Download Summary menu link to download a portable copy of your medical information. If you have questions, please visit the Frequently Asked Questions section of the [employer] website. Remember, [employer] is NOT to be used for urgent needs. For medical emergencies, dial 911. Now available from your iPhone and Android! Please provide this summary of care documentation to your next provider. Your primary care clinician is listed as Maureen Patel. If you have any questions after today's visit, please call [phone].

## 2017-05-15 NOTE — TELEPHONE ENCOUNTER
Spoke with Ms. Jemima. Advised her that her potassium is low. Will send in potassium. A1c is up slightly from 6.8 to 7.0. Advised to watch weight, try to increase physical activity and continue current medications.

## 2017-05-17 LAB
BACTERIA SPEC CULT: ABNORMAL
SERVICE CMNT-IMP: ABNORMAL

## 2017-05-18 ENCOUNTER — TELEPHONE (OUTPATIENT)
Dept: INTERNAL MEDICINE CLINIC | Age: 52
End: 2017-05-18

## 2017-05-18 DIAGNOSIS — N39.0 URINARY TRACT INFECTION WITHOUT HEMATURIA, SITE UNSPECIFIED: Primary | ICD-10-CM

## 2017-05-18 RX ORDER — NITROFURANTOIN 25; 75 MG/1; MG/1
100 CAPSULE ORAL 2 TIMES DAILY
Qty: 14 CAP | Refills: 0 | Status: SHIPPED | OUTPATIENT
Start: 2017-05-18 | End: 2017-08-14

## 2017-08-14 ENCOUNTER — OFFICE VISIT (OUTPATIENT)
Dept: INTERNAL MEDICINE CLINIC | Age: 52
End: 2017-08-14

## 2017-08-14 ENCOUNTER — HOSPITAL ENCOUNTER (OUTPATIENT)
Dept: LAB | Age: 52
Discharge: HOME | End: 2017-08-14
Payer: MEDICARE

## 2017-08-14 VITALS
HEART RATE: 70 BPM | HEIGHT: 67 IN | OXYGEN SATURATION: 99 % | SYSTOLIC BLOOD PRESSURE: 129 MMHG | TEMPERATURE: 98.5 F | RESPIRATION RATE: 18 BRPM | DIASTOLIC BLOOD PRESSURE: 85 MMHG | BODY MASS INDEX: 29.19 KG/M2 | WEIGHT: 186 LBS

## 2017-08-14 DIAGNOSIS — I10 ESSENTIAL HYPERTENSION, BENIGN: ICD-10-CM

## 2017-08-14 DIAGNOSIS — E87.6 HYPOKALEMIA: ICD-10-CM

## 2017-08-14 DIAGNOSIS — R05.9 COUGH: ICD-10-CM

## 2017-08-14 DIAGNOSIS — E11.9 TYPE 2 DIABETES MELLITUS WITHOUT COMPLICATION, WITHOUT LONG-TERM CURRENT USE OF INSULIN (HCC): ICD-10-CM

## 2017-08-14 DIAGNOSIS — E11.9 TYPE 2 DIABETES MELLITUS WITHOUT COMPLICATION, WITHOUT LONG-TERM CURRENT USE OF INSULIN (HCC): Primary | ICD-10-CM

## 2017-08-14 LAB
ALBUMIN SERPL BCP-MCNC: 3.4 G/DL (ref 3.4–5)
ALBUMIN/GLOB SERPL: 0.8 {RATIO} (ref 0.8–1.7)
ALP SERPL-CCNC: 86 U/L (ref 45–117)
ALT SERPL-CCNC: 29 U/L (ref 13–56)
ANION GAP BLD CALC-SCNC: 7 MMOL/L (ref 3–18)
AST SERPL W P-5'-P-CCNC: 24 U/L (ref 15–37)
BILIRUB SERPL-MCNC: 0.5 MG/DL (ref 0.2–1)
BUN SERPL-MCNC: 7 MG/DL (ref 7–18)
BUN/CREAT SERPL: 9 (ref 12–20)
CALCIUM SERPL-MCNC: 9 MG/DL (ref 8.5–10.1)
CHLORIDE SERPL-SCNC: 103 MMOL/L (ref 100–108)
CHOLEST SERPL-MCNC: 170 MG/DL
CO2 SERPL-SCNC: 30 MMOL/L (ref 21–32)
CREAT SERPL-MCNC: 0.77 MG/DL (ref 0.6–1.3)
EST. AVERAGE GLUCOSE BLD GHB EST-MCNC: 148 MG/DL
GLOBULIN SER CALC-MCNC: 4.5 G/DL (ref 2–4)
GLUCOSE SERPL-MCNC: 97 MG/DL (ref 74–99)
HBA1C MFR BLD: 6.8 % (ref 4.2–5.6)
HDLC SERPL-MCNC: 55 MG/DL (ref 40–60)
HDLC SERPL: 3.1 {RATIO} (ref 0–5)
LDLC SERPL CALC-MCNC: 95.6 MG/DL (ref 0–100)
LIPID PROFILE,FLP: NORMAL
POTASSIUM SERPL-SCNC: 3.9 MMOL/L (ref 3.5–5.5)
PROT SERPL-MCNC: 7.9 G/DL (ref 6.4–8.2)
SODIUM SERPL-SCNC: 140 MMOL/L (ref 136–145)
TRIGL SERPL-MCNC: 97 MG/DL (ref ?–150)
VLDLC SERPL CALC-MCNC: 19.4 MG/DL

## 2017-08-14 PROCEDURE — 83036 HEMOGLOBIN GLYCOSYLATED A1C: CPT | Performed by: PHYSICIAN ASSISTANT

## 2017-08-14 PROCEDURE — 80053 COMPREHEN METABOLIC PANEL: CPT | Performed by: PHYSICIAN ASSISTANT

## 2017-08-14 PROCEDURE — 80061 LIPID PANEL: CPT | Performed by: PHYSICIAN ASSISTANT

## 2017-08-14 RX ORDER — LANCETS
EACH MISCELLANEOUS
Qty: 100 EACH | Refills: 11 | Status: SHIPPED | OUTPATIENT
Start: 2017-08-14 | End: 2017-11-15 | Stop reason: SDUPTHER

## 2017-08-14 RX ORDER — FLUTICASONE FUROATE AND VILANTEROL 100; 25 UG/1; UG/1
POWDER RESPIRATORY (INHALATION)
Qty: 1 INHALER | Refills: 5 | Status: SHIPPED | OUTPATIENT
Start: 2017-08-14 | End: 2017-11-15 | Stop reason: SDUPTHER

## 2017-08-14 RX ORDER — MONTELUKAST SODIUM 10 MG/1
TABLET ORAL
Qty: 90 TAB | Refills: 1 | Status: SHIPPED | OUTPATIENT
Start: 2017-08-14 | End: 2017-11-03 | Stop reason: SDUPTHER

## 2017-08-14 RX ORDER — LISINOPRIL 5 MG/1
5 TABLET ORAL DAILY
Qty: 90 TAB | Refills: 1 | Status: SHIPPED | OUTPATIENT
Start: 2017-08-14 | End: 2017-11-15 | Stop reason: SDUPTHER

## 2017-08-14 RX ORDER — HYDROCHLOROTHIAZIDE 25 MG/1
TABLET ORAL
Qty: 90 TAB | Refills: 1 | Status: SHIPPED | OUTPATIENT
Start: 2017-08-14 | End: 2017-11-15 | Stop reason: SDUPTHER

## 2017-08-14 RX ORDER — ALBUTEROL SULFATE 90 UG/1
2 AEROSOL, METERED RESPIRATORY (INHALATION)
Qty: 1 INHALER | Refills: 5 | Status: SHIPPED | OUTPATIENT
Start: 2017-08-14 | End: 2017-11-15 | Stop reason: SDUPTHER

## 2017-08-14 RX ORDER — POTASSIUM CHLORIDE 750 MG/1
10 TABLET, EXTENDED RELEASE ORAL DAILY
Qty: 90 TAB | Refills: 1 | Status: SHIPPED | OUTPATIENT
Start: 2017-08-14 | End: 2017-11-15 | Stop reason: SDUPTHER

## 2017-08-14 NOTE — MR AVS SNAPSHOT
Visit Information Date & Time Provider Department Dept. Phone Encounter #  
 8/14/2017 10:00 AM Rustam Baldwin PA-C [facility] [phone] [id number] Follow-up Instructions Return in about 3 months (around 11/14/2017) for Combo. Your Appointments 3/27/2018  1:20 PM  
ESTABLISHED PATIENT with [facility] ([facility]) Appt Note: Return for PA f/u in 1 year. [address] [facility], [address]  
  
   
 [address] [place], [address] [facility] [address] Upcoming Health Maintenance Date Due INFLUENZA AGE 9 TO ADULT 8/15/2017* HEMOGLOBIN A1C Q6M 11/15/2017 FOOT EXAM Q1 2/15/2018 MEDICARE YEARLY EXAM 2/16/2018 EYE EXAM RETINAL OR DILATED Q1 4/10/2018 MICROALBUMIN Q1 5/15/2018 LIPID PANEL Q1 5/15/2018 BREAST CANCER SCRN MAMMOGRAM 9/26/2018 PAP AKA CERVICAL CYTOLOGY 5/19/2019 COLONOSCOPY 5/14/2022 DTaP/Tdap/Td series (2 - Td) 7/14/2026 *Topic was postponed. The date shown is not the original due date. Allergies as of 8/14/2017  Review Complete On: 8/14/2017 By: Lei Denton Severity Noted Reaction Type Reactions Aspirin  10/25/2012    Hives Bactrim [Sulfamethoprim Ds]  10/25/2012    Swelling Current Immunizations  Never Reviewed Name Date Influenza Vaccine 9/10/2015, 10/16/2014, 11/1/2013 Influenza Vaccine (Quad) PF 11/14/2016 Pneumococcal Polysaccharide (PPSV-23) 11/1/2013 Td, Adsorbed PF 9/10/2015 Zoster Vaccine, Live 7/1/2015 Not reviewed this visit You Were Diagnosed With   
  
 Codes Comments Type 2 diabetes mellitus without complication, without long-term current use of insulin (HCC)    -  Primary ICD-10-CM: E11.9 ICD-9-CM: 250.00 Cough     ICD-10-CM: R05 ICD-9-CM: 786.2 Essential hypertension, benign     ICD-10-CM: I10 
ICD-9-CM: 401.1 Hypokalemia     ICD-10-CM: E87.6 ICD-9-CM: 276.8 Vitals BP Pulse Temp Resp Height(growth percentile) Weight(growth percentile) 129/85 (BP 1 Location: Left arm, BP Patient Position: Sitting) 70 98.5 °F (36.9 °C) (Oral) 18 5' 7\" (1.702 m) 186 lb (84.4 kg) SpO2 BMI OB Status Smoking Status 99% 29.13 kg/m2 Postmenopausal Current Every Day Smoker Vitals History BMI and BSA Data Body Mass Index Body Surface Area  
 29.13 kg/m 2 2 m 2 Preferred Pharmacy Pharmacy Name Phone [address], [address] [place] [address] [phone] Your Updated Medication List  
  
   
This list is accurate as of: 8/14/17 10:09 AM.  Always use your most recent med list.  
  
  
  
  
 ABILIFY 30 mg tablet Generic drug:  ARIPiprazole  
  
 albuterol 90 mcg/actuation inhaler Commonly known as:  PROVENTIL HFA, VENTOLIN HFA, PROAIR HFA Take 2 Puffs by inhalation every six (6) hours as needed for Wheezing. diazePAM 10 mg tablet Commonly known as:  VALIUM Take 10 mg by mouth every six (6) hours as needed. estradiol 0.01 % (0.1 mg/gram) vaginal cream  
Commonly known as:  ESTRACE Apply fingertip of estrogen cream to vaginal area daily  
  
 fluticasone-vilanterol 100-25 mcg/dose inhaler Commonly known as:  BREO ELLIPTA INHALE ONE PUFF BY MOUTH DAILY  
  
 gabapentin 600 mg tablet Commonly known as:  NEURONTIN Take  by mouth two (2) times a day. glucose blood VI test strips strip Commonly known as:  blood glucose test  
True result Test blood glucose daily  
  
 hydroCHLOROthiazide 25 mg tablet Commonly known as:  HYDRODIURIL  
TAKE 1 TABLET BY MOUTH DAILY Lancets Misc True results lancets Test glucose daily LEXAPRO 20 mg tablet Generic drug:  escitalopram oxalate Take 20 mg by mouth daily. lisinopril 5 mg tablet Commonly known as:  Ronnie Mart  
 Take 1 Tab by mouth daily. montelukast 10 mg tablet Commonly known as:  SINGULAIR  
TAKE 1 TABLET BY MOUTH DAILY  
  
 NUCYNTA 100 mg tablet Generic drug:  tapentadol Take 100 mg by mouth every six (6) hours as needed. potassium chloride 10 mEq tablet Commonly known as:  KLOR-CON Take 1 Tab by mouth daily. SITagliptin-metFORMIN  mg per tablet Commonly known as:  Adine Lowers Take 1 Tab by mouth two (2) times daily (with meals). Prescriptions Printed Refills Lancets misc 11 Sig: True results lancets Test glucose daily Class: Print  
 glucose blood VI test strips (BLOOD GLUCOSE TEST) strip 1 Sig: True result Test blood glucose daily Class: Print Prescriptions Sent to Pharmacy Refills  
 montelukast (SINGULAIR) 10 mg tablet 1 Sig: TAKE 1 TABLET BY MOUTH DAILY Class: Normal  
 Pharmacy: [facility] [address]. [place] [address] Ph #: [phone]  
 hydroCHLOROthiazide (HYDRODIURIL) 25 mg tablet 1 Sig: TAKE 1 TABLET BY MOUTH DAILY Class: Normal  
 Pharmacy: [facility] [address]. [place] [address] Ph #: [phone]  
 albuterol (PROVENTIL HFA, VENTOLIN HFA, PROAIR HFA) 90 mcg/actuation inhaler 5 Sig: Take 2 Puffs by inhalation every six (6) hours as needed for Wheezing. Class: Normal  
 Pharmacy: [facility] [address]. [place] [address] Ph #: [phone] Route: Inhalation  
 lisinopril (PRINIVIL, ZESTRIL) 5 mg tablet 1 Sig: Take 1 Tab by mouth daily. Class: Normal  
 Pharmacy: [facility] [address]. [place] [address] Ph #: [phone]  Route: Oral  
 SITagliptin-metFORMIN (JANUMET)  mg per tablet 1  
 Sig: Take 1 Tab by mouth two (2) times daily (with meals). Class: Normal  
 Pharmacy: [facility] [facility]. [address] #: [phone] Route: Oral  
 fluticasone-vilanterol (BREO ELLIPTA) 100-25 mcg/dose inhaler 5 Sig: INHALE ONE PUFF BY MOUTH DAILY Class: Normal  
 Pharmacy: [facility] [address] [facility]. [place] [address] Ph #: [phone]  
 potassium chloride (KLOR-CON) 10 mEq tablet 1 Sig: Take 1 Tab by mouth daily. Class: Normal  
 Pharmacy: [facility] [facility]  Ph #: [phone] Route: Oral  
  
We Performed the Following [place] COLLECTION VENOUS BLOOD,VENIPUNCTURE [id number] CPT(R)] Follow-up Instructions Return in about 3 months (around 11/14/2017) for Combo. To-Do List   
 08/14/2017 Lab:  HEMOGLOBIN A1C WITH EAG   
  
 08/14/2017 Lab:  LIPID PANEL   
  
 08/14/2017 Lab:  METABOLIC PANEL, COMPREHENSIVE Introducing \[facility]\"" & HEALTH SERVICES! Joel Oneill introduces [employer] patient portal. Now you can access parts of your medical record, email your doctor's office, and request medication refills online. 1. In your internet browser, go to [email or web address] 2. Click on the First Time User? Click Here link in the Sign In box. You will see the New Member Sign Up page. 3. Enter your [employer] Access Code exactly as it appears below. You will not need to use this code after youve completed the sign-up process. If you do not sign up before the expiration date, you must request a new code. · [employer] Access Code: [email or web address] Expires: 11/12/2017 10:09 AM 
 
4. Enter the last four digits of your Social Security Number (xxxx) and Date of Birth (mm/dd/yyyy) as indicated and click Submit.  You will be taken to the next sign-up page. 5. Create a [employer] ID. This will be your [employer] login ID and cannot be changed, so think of one that is secure and easy to remember. 6. Create a [employer] password. You can change your password at any time. 7. Enter your Password Reset Question and Answer. This can be used at a later time if you forget your password. 8. Enter your e-mail address. You will receive e-mail notification when new information is available in [address] 9. Click Sign Up. You can now view and download portions of your medical record. 10. Click the Download Summary menu link to download a portable copy of your medical information. If you have questions, please visit the Frequently Asked Questions section of the [employer] website. Remember, [employer] is NOT to be used for urgent needs. For medical emergencies, dial 911. Now available from your iPhone and Android! Please provide this summary of care documentation to your next provider. Your primary care clinician is listed as Bjorn Estrada. If you have any questions after today's visit, please call [phone].

## 2017-08-14 NOTE — PROGRESS NOTES
HISTORY OF PRESENT ILLNESS  Jacobo Diallo is a 46 y.o. female. HPI Jacobo Diallo is here for follow up on diabetes, HTN and hypercholesterolemia. She states she is doing well. She states she has been diagnoses with emphysema by pulmonology. On the Ct of her chest is showed an adrenal adenoma and was recommended to follow up in 12 month. Review of Systems   HENT: Negative. Eyes: Negative. Cardiovascular: Negative. Gastrointestinal: Negative. Musculoskeletal: Positive for back pain, joint pain and myalgias. Sees pain management   Neurological: Negative for dizziness and tingling. Physical Exam   Constitutional: She appears well-developed and well-nourished. No distress. HENT:   Head: Normocephalic and atraumatic. Eyes: Conjunctivae are normal.   Cardiovascular: Normal rate and regular rhythm. No murmur heard. Pulmonary/Chest: Effort normal. She has no wheezes. Musculoskeletal: She exhibits no edema. Neurological: She is alert. Visit Vitals    /85 (BP 1 Location: Left arm, BP Patient Position: Sitting)    Pulse 70    Temp 98.5 °F (36.9 °C) (Oral)    Resp 18    Ht 5' 7\" (1.702 m)    Wt 186 lb (84.4 kg)    SpO2 99%    BMI 29.13 kg/m2       ASSESSMENT and PLAN    ICD-10-CM ICD-9-CM    1. Type 2 diabetes mellitus without complication, without long-term current use of insulin ([facility]) E11.9 250.00 GA COLLECTION VENOUS BLOOD,VENIPUNCTURE      METABOLIC PANEL, COMPREHENSIVE      LIPID PANEL      HEMOGLOBIN A1C WITH EAG      SITagliptin-metFORMIN (JANUMET)  mg per tablet      Lancets misc      glucose blood VI test strips (BLOOD GLUCOSE TEST) strip   2. Cough R05 786.2 montelukast (SINGULAIR) 10 mg tablet      albuterol (PROVENTIL HFA, VENTOLIN HFA, PROAIR HFA) 90 mcg/actuation inhaler      fluticasone-vilanterol (BREO ELLIPTA) 100-25 mcg/dose inhaler   3.  Essential hypertension, benign I10 401.1 hydroCHLOROthiazide (HYDRODIURIL) 25 mg tablet      lisinopril (PRINIVIL, ZESTRIL) 5 mg tablet   4. Hypokalemia E87.6 276.8 potassium chloride (KLOR-CON) 10 mEq tablet     Pt verbalized understanding of their condition and diagnoses, treatment plan,  as well as side effects of any new medications prescribed.

## 2017-08-14 NOTE — PROGRESS NOTES
Chief Complaint   Patient presents with    Diabetes    Hypertension     1. Have you been to the ER, urgent care clinic since your last visit? Hospitalized since your last visit? No    2. Have you seen or consulted any other health care providers outside of the [address] [place] [address] since your last visit? Include any pap smears or colon screening.  Yes Where: pulmonary and podiatry

## 2017-11-03 DIAGNOSIS — R05.9 COUGH: ICD-10-CM

## 2017-11-03 RX ORDER — MONTELUKAST SODIUM 10 MG/1
TABLET ORAL
Qty: 90 TAB | Refills: 3 | Status: SHIPPED | OUTPATIENT
Start: 2017-11-03 | End: 2017-11-15 | Stop reason: SDUPTHER

## 2017-11-15 ENCOUNTER — OFFICE VISIT (OUTPATIENT)
Dept: INTERNAL MEDICINE CLINIC | Age: 52
End: 2017-11-15

## 2017-11-15 VITALS
BODY MASS INDEX: 29.51 KG/M2 | RESPIRATION RATE: 18 BRPM | OXYGEN SATURATION: 99 % | TEMPERATURE: 98.5 F | HEART RATE: 68 BPM | WEIGHT: 188 LBS | SYSTOLIC BLOOD PRESSURE: 134 MMHG | HEIGHT: 67 IN | DIASTOLIC BLOOD PRESSURE: 86 MMHG

## 2017-11-15 DIAGNOSIS — E11.9 TYPE 2 DIABETES MELLITUS WITHOUT COMPLICATION, WITHOUT LONG-TERM CURRENT USE OF INSULIN (HCC): Primary | ICD-10-CM

## 2017-11-15 DIAGNOSIS — T36.95XA ANTIBIOTIC-INDUCED YEAST INFECTION: ICD-10-CM

## 2017-11-15 DIAGNOSIS — E87.6 HYPOKALEMIA: ICD-10-CM

## 2017-11-15 DIAGNOSIS — I10 ESSENTIAL HYPERTENSION, BENIGN: ICD-10-CM

## 2017-11-15 DIAGNOSIS — R05.9 COUGH: ICD-10-CM

## 2017-11-15 DIAGNOSIS — B37.9 ANTIBIOTIC-INDUCED YEAST INFECTION: ICD-10-CM

## 2017-11-15 DIAGNOSIS — R82.998 LEUKOCYTES IN URINE: ICD-10-CM

## 2017-11-15 DIAGNOSIS — R35.0 URINARY FREQUENCY: ICD-10-CM

## 2017-11-15 LAB
BILIRUB UR QL STRIP: NEGATIVE
GLUCOSE UR-MCNC: NEGATIVE MG/DL
KETONES P FAST UR STRIP-MCNC: NEGATIVE MG/DL
PH UR STRIP: 6 [PH] (ref 4.6–8)
PROT UR QL STRIP: NEGATIVE
SP GR UR STRIP: 1.01 (ref 1–1.03)
UA UROBILINOGEN AMB POC: NORMAL (ref 0.2–1)
URINALYSIS CLARITY POC: CLEAR
URINALYSIS COLOR POC: YELLOW
URINE BLOOD POC: NEGATIVE
URINE LEUKOCYTES POC: NORMAL
URINE NITRITES POC: POSITIVE

## 2017-11-15 RX ORDER — FLUTICASONE FUROATE AND VILANTEROL 100; 25 UG/1; UG/1
POWDER RESPIRATORY (INHALATION)
Qty: 1 INHALER | Refills: 5 | Status: SHIPPED | OUTPATIENT
Start: 2017-11-15 | End: 2018-02-13 | Stop reason: SDUPTHER

## 2017-11-15 RX ORDER — LANCETS
EACH MISCELLANEOUS
Qty: 100 EACH | Refills: 11 | Status: SHIPPED | OUTPATIENT
Start: 2017-11-15 | End: 2018-02-13 | Stop reason: SDUPTHER

## 2017-11-15 RX ORDER — LISINOPRIL 5 MG/1
5 TABLET ORAL DAILY
Qty: 90 TAB | Refills: 1 | Status: SHIPPED | OUTPATIENT
Start: 2017-11-15 | End: 2018-02-13 | Stop reason: SDUPTHER

## 2017-11-15 RX ORDER — LANCETS
EACH MISCELLANEOUS
Qty: 100 EACH | Refills: 11 | Status: SHIPPED | OUTPATIENT
Start: 2017-11-15 | End: 2017-11-15 | Stop reason: SDUPTHER

## 2017-11-15 RX ORDER — ALBUTEROL SULFATE 90 UG/1
2 AEROSOL, METERED RESPIRATORY (INHALATION)
Qty: 1 INHALER | Refills: 5 | Status: SHIPPED | OUTPATIENT
Start: 2017-11-15 | End: 2017-11-17 | Stop reason: CLARIF

## 2017-11-15 RX ORDER — MONTELUKAST SODIUM 10 MG/1
TABLET ORAL
Qty: 90 TAB | Refills: 3 | Status: SHIPPED | OUTPATIENT
Start: 2017-11-15 | End: 2018-02-13 | Stop reason: SDUPTHER

## 2017-11-15 RX ORDER — ESTRADIOL 0.1 MG/G
CREAM VAGINAL
Qty: 42.5 G | Refills: 10 | Status: SHIPPED | OUTPATIENT
Start: 2017-11-15 | End: 2018-02-13

## 2017-11-15 RX ORDER — HYDROCHLOROTHIAZIDE 25 MG/1
TABLET ORAL
Qty: 90 TAB | Refills: 1 | Status: SHIPPED | OUTPATIENT
Start: 2017-11-15 | End: 2018-02-13 | Stop reason: SDUPTHER

## 2017-11-15 RX ORDER — POTASSIUM CHLORIDE 750 MG/1
10 TABLET, EXTENDED RELEASE ORAL DAILY
Qty: 90 TAB | Refills: 1 | Status: SHIPPED | OUTPATIENT
Start: 2017-11-15 | End: 2018-02-13 | Stop reason: SDUPTHER

## 2017-11-15 RX ORDER — LEDIPASVIR AND SOFOSBUVIR 90; 400 MG/1; MG/1
TABLET, FILM COATED ORAL
Status: ON HOLD | COMMUNITY
Start: 2017-10-30 | End: 2018-04-26

## 2017-11-15 RX ORDER — FLUCONAZOLE 150 MG/1
150 TABLET ORAL DAILY
Qty: 1 TAB | Refills: 0 | Status: SHIPPED | OUTPATIENT
Start: 2017-11-15 | End: 2018-05-24 | Stop reason: SDUPTHER

## 2017-11-15 NOTE — MR AVS SNAPSHOT
Visit Information Date & Time Provider Department Dept. Phone Encounter #  
 11/15/2017  9:30 AM Adonay Porter PA-C [facility] [phone] [id number] Follow-up Instructions Return in about 3 months (around 2/15/2018) for Combo. Your Appointments 3/13/2018  9:30 AM  
ESTABLISHED PATIENT with [facility] ([facility]) Appt Note: Return for PA f/u in 1 year. [address] [facility] [address]  
  
   
 [address], [address] [facility] [address] Upcoming Health Maintenance Date Due Influenza Age 5 to Adult 8/1/2017 HEMOGLOBIN A1C Q6M 2/14/2018 FOOT EXAM Q1 2/15/2018 MEDICARE YEARLY EXAM 2/16/2018 EYE EXAM RETINAL OR DILATED Q1 4/10/2018 MICROALBUMIN Q1 5/15/2018 LIPID PANEL Q1 8/14/2018 BREAST CANCER SCRN MAMMOGRAM 9/26/2018 PAP AKA CERVICAL CYTOLOGY 5/19/2019 COLONOSCOPY 5/14/2022 DTaP/Tdap/Td series (2 - Td) 7/14/2026 Allergies as of 11/15/2017  Review Complete On: 11/15/2017 By: Elana Plate Severity Noted Reaction Type Reactions Aspirin  10/25/2012    Hives Bactrim [Sulfamethoprim Ds]  10/25/2012    Swelling Current Immunizations  Never Reviewed Name Date Influenza Vaccine 9/10/2015, 10/16/2014, 11/1/2013 Influenza Vaccine (Quad) PF 11/14/2016 Pneumococcal Polysaccharide (PPSV-23) 11/1/2013 Td, Adsorbed PF 9/10/2015 Zoster Vaccine, Live 7/1/2015 Not reviewed this visit You Were Diagnosed With   
  
 Codes Comments Urinary frequency    -  Primary ICD-10-CM: R35.0 ICD-9-CM: 788.41 Cough     ICD-10-CM: R05 ICD-9-CM: 786.2 Essential hypertension, benign     ICD-10-CM: I10 
ICD-9-CM: 401.1 Type 2 diabetes mellitus without complication, without long-term current use of insulin (HCC)     ICD-10-CM: E11.9 ICD-9-CM: 250.00 Hypokalemia     ICD-10-CM: E87.6 ICD-9-CM: 276.8 Antibiotic-induced yeast infection     ICD-10-CM: B37.9, T36.95XA ICD-9-CM: 112.9, E930.9 Vitals BP Pulse Temp Resp Height(growth percentile) Weight(growth percentile) 134/86 (BP 1 Location: Left arm, BP Patient Position: Sitting) 68 98.5 °F (36.9 °C) (Oral) 18 5' 7\" (1.702 m) 188 lb (85.3 kg) SpO2 BMI OB Status Smoking Status 99% 29.44 kg/m2 Postmenopausal Current Every Day Smoker BMI and BSA Data Body Mass Index Body Surface Area  
 29.44 kg/m 2 2.01 m 2 Preferred Pharmacy Pharmacy Name Phone [address], [address] [facility]. [place] [address] [phone] Your Updated Medication List  
  
   
This list is accurate as of: 11/15/17  9:31 AM.  Always use your most recent med list.  
  
  
  
  
 ABILIFY 30 mg tablet Generic drug:  ARIPiprazole  
  
 albuterol 90 mcg/actuation inhaler Commonly known as:  PROVENTIL HFA, VENTOLIN HFA, PROAIR HFA Take 2 Puffs by inhalation every six (6) hours as needed for Wheezing. diazePAM 10 mg tablet Commonly known as:  VALIUM Take 10 mg by mouth every six (6) hours as needed. estradiol 0.01 % (0.1 mg/gram) vaginal cream  
Commonly known as:  ESTRACE Apply fingertip of estrogen cream to vaginal area daily  
  
 fluconazole 150 mg tablet Commonly known as:  DIFLUCAN Take 1 Tab by mouth daily for 1 day. FDA advises cautious prescribing of oral fluconazole in pregnancy. fluticasone-vilanterol 100-25 mcg/dose inhaler Commonly known as:  BREO ELLIPTA INHALE ONE PUFF BY MOUTH DAILY  
  
 gabapentin 600 mg tablet Commonly known as:  NEURONTIN Take  by mouth two (2) times a day. glucose blood VI test strips strip Commonly known as:  blood glucose test  
True resultTest blood glucose daily HARVONI  mg Tab Generic drug:  ledipasvir-sofosbuvir  
  
 hydroCHLOROthiazide 25 mg tablet Commonly known as:  HYDRODIURIL  
TAKE 1 TABLET BY MOUTH DAILY Lancets Misc True results lancets Test glucose daily LEXAPRO 20 mg tablet Generic drug:  escitalopram oxalate Take 20 mg by mouth daily. lisinopril 5 mg tablet Commonly known as:  Gloria Pares Take 1 Tab by mouth daily. montelukast 10 mg tablet Commonly known as:  SINGULAIR  
TAKE 1 TABLET BY MOUTH DAILY  
  
 NUCYNTA 100 mg tablet Generic drug:  tapentadol Take 100 mg by mouth every six (6) hours as needed. potassium chloride 10 mEq tablet Commonly known as:  KLOR-CON Take 1 Tab by mouth daily. SITagliptin-metFORMIN  mg per tablet Commonly known as:  Marianela Barrera Take 1 Tab by mouth two (2) times daily (with meals). Prescriptions Sent to Pharmacy Refills  
 montelukast (SINGULAIR) 10 mg tablet 3 Sig: TAKE 1 TABLET BY MOUTH DAILY Class: Normal  
 Pharmacy: [facility] [address] [place] [address] Ph #: [phone]  
 hydroCHLOROthiazide (HYDRODIURIL) 25 mg tablet 1 Sig: TAKE 1 TABLET BY MOUTH DAILY Class: Normal  
 Pharmacy: [facility] [address] [place] [address] Ph #: [phone]  
 albuterol (PROVENTIL HFA, VENTOLIN HFA, PROAIR HFA) 90 mcg/actuation inhaler 5 Sig: Take 2 Puffs by inhalation every six (6) hours as needed for Wheezing. Class: Normal  
 Pharmacy: [facility] [address] [place] [address] Ph #: [phone] Route: Inhalation  
 lisinopril (PRINIVIL, ZESTRIL) 5 mg tablet 1 Sig: Take 1 Tab by mouth daily. Class: Normal  
 Pharmacy: [facility] [address] [place] [address] Ph #: [phone]  Route: Oral  
 SITagliptin-metFORMIN (JANUMET)  mg per tablet 1  
 Sig: Take 1 Tab by mouth two (2) times daily (with meals). Class: Normal  
 Pharmacy: [facility] [address] [place] [address] Ph #: [phone] Route: Oral  
 fluticasone-vilanterol (BREO ELLIPTA) 100-25 mcg/dose inhaler 5 Sig: INHALE ONE PUFF BY MOUTH DAILY Class: Normal  
 Pharmacy: [address] [place] [address] Ph #: [phone]  
 glucose blood VI test strips (BLOOD GLUCOSE TEST) strip 1 Sig: True resultTest blood glucose daily Class: Normal  
 Pharmacy: [address] [place] [address] Ph #: [phone]  
 potassium chloride (KLOR-CON) 10 mEq tablet 1 Sig: Take 1 Tab by mouth daily. Class: Normal  
 Pharmacy: [facility] [address] [place] [address] Ph #: [phone] Route: Oral  
 estradiol (ESTRACE) 0.01 % (0.1 mg/gram) vaginal cream 10 Sig: Apply fingertip of estrogen cream to vaginal area daily Class: Normal  
 Pharmacy: [place] [address] Ph #: [phone]  
 fluconazole (DIFLUCAN) 150 mg tablet 0 Sig: Take 1 Tab by mouth daily for 1 day. FDA advises cautious prescribing of oral fluconazole in pregnancy. Class: Normal  
 Pharmacy: [facility] [address] [place] [address] Ph #: [phone] Route: Oral  
 Lancets misc 11 Sig: True results lancets Test glucose daily Class: Normal  
 Pharmacy: [facility] [address] [place] [address] Ph #: [phone] We Performed the Following AMB POC URINALYSIS DIP STICK AUTO W/O MICRO [02356 CPT(R)] [place] COLLECTION VENOUS BLOOD,VENIPUNCTURE [id number] CPT(R)] Follow-up Instructions Return in about 3 months (around 2/15/2018) for Combo. To-Do List   
 11/15/2017 Lab:  HEMOGLOBIN A1C WITH EAG   
  
 11/15/2017 Lab:  LIPID PANEL   
  
 11/15/2017 Lab:  METABOLIC PANEL, COMPREHENSIVE Introducing [facility] & HEALTH SERVICES! [employer] introduces [employer] patient portal. Now you can access parts of your medical record, email your doctor's office, and request medication refills online. 1. In your internet browser, go to [email or web address] 2. Click on the First Time User? Click Here link in the Sign In box. You will see the New Member Sign Up page. 3. Enter your [employer] Access Code exactly as it appears below. You will not need to use this code after youve completed the sign-up process. If you do not sign up before the expiration date, you must request a new code. · [employer] Access Code: [email or web address] Expires: 2/13/2018  9:31 AM 
 
4. Enter the last four digits of your Social Security Number (xxxx) and Date of Birth (mm/dd/yyyy) as indicated and click Submit. You will be taken to the next sign-up page. 5. Create a [employer] ID. This will be your [employer] login ID and cannot be changed, so think of one that is secure and easy to remember. 6. Create a [employer] password. You can change your password at any time. 7. Enter your Password Reset Question and Answer. This can be used at a later time if you forget your password. 8. Enter your e-mail address. You will receive e-mail notification when new information is available in [address] 9. Click Sign Up. You can now view and download portions of your medical record. 10. Click the Download Summary menu link to download a portable copy of your medical information. If you have questions, please visit the Frequently Asked Questions section of the [employer] website.  Remember, [employer] is NOT to be used for urgent needs. For medical emergencies, dial 911. Now available from your iPhone and Android! Please provide this summary of care documentation to your next provider. Your primary care clinician is listed as Emelyn Felton. If you have any questions after today's visit, please call [phone].

## 2017-11-15 NOTE — PROGRESS NOTES
HISTORY OF PRESENT ILLNESS  Marco A Cruz is a 46 y.o. female. HPI Marco A Cruz is here for follow up on diabetes, HTN and hypercholesterolemia. She reports no change in her health. She has started harvoni for treatment of hep C and has no side effects. Review of Systems   Constitutional: Negative. Eyes: Negative. Respiratory: Negative. Cardiovascular: Negative. Genitourinary: Positive for frequency and urgency. Neurological: Negative for dizziness and tingling. Physical Exam   Constitutional: She is oriented to person, place, and time. She appears well-developed and well-nourished. No distress. HENT:   Head: Normocephalic and atraumatic. Eyes: Conjunctivae are normal.   Cardiovascular: Normal rate. Pulmonary/Chest: Effort normal and breath sounds normal. She has no wheezes. Musculoskeletal: She exhibits no edema. Neurological: She is alert and oriented to person, place, and time. Visit Vitals    /86 (BP 1 Location: Left arm, BP Patient Position: Sitting)    Pulse 68    Temp 98.5 °F (36.9 °C) (Oral)    Resp 18    Ht 5' 7\" (1.702 m)    Wt 188 lb (85.3 kg)    SpO2 99%    BMI 29.44 kg/m2     Results for orders placed or performed in visit on 11/15/17   AMB POC URINALYSIS DIP STICK AUTO W/O MICRO   Result Value Ref Range    Color (UA POC) Yellow     Clarity (UA POC) Clear     Glucose (UA POC) Negative Negative    Bilirubin (UA POC) Negative Negative    Ketones (UA POC) Negative Negative    Specific gravity (UA POC) 1.015 1.001 - 1.035    Blood (UA POC) Negative Negative    pH (UA POC) 6.0 4.6 - 8.0    Protein (UA POC) Negative Negative    Urobilinogen (UA POC) 1 mg/dL 0.2 - 1    Nitrites (UA POC) Positive Negative    Leukocyte esterase (UA POC) 1+ Negative       ASSESSMENT and PLAN    ICD-10-CM ICD-9-CM    1.  Type 2 diabetes mellitus without complication, without long-term current use of insulin (HCC) E11.9 250.00 SITagliptin-metFORMIN (JANUMET)  mg per tablet glucose blood VI test strips (BLOOD GLUCOSE TEST) strip      [place] COLLECTION VENOUS BLOOD,VENIPUNCTURE      METABOLIC PANEL, COMPREHENSIVE      LIPID PANEL      HEMOGLOBIN A1C WITH EAG      AMB POC URINALYSIS DIP STICK AUTO W/O MICRO      Lancets misc      DISCONTINUED: Lancets misc   2. Cough R05 786.2 montelukast (SINGULAIR) 10 mg tablet      albuterol (PROVENTIL HFA, VENTOLIN HFA, PROAIR HFA) 90 mcg/actuation inhaler      fluticasone-vilanterol (BREO ELLIPTA) 100-25 mcg/dose inhaler   3. Essential hypertension, benign I10 401.1 hydroCHLOROthiazide (HYDRODIURIL) 25 mg tablet      lisinopril (PRINIVIL, ZESTRIL) 5 mg tablet   4. Hypokalemia E87.6 276.8 potassium chloride (KLOR-CON) 10 mEq tablet   5. Urinary frequency R35.0 788.41 AMB POC URINALYSIS DIP STICK AUTO W/O MICRO   6. Antibiotic-induced yeast infection B37.9 112.9 fluconazole (DIFLUCAN) 150 mg tablet - requesting that I send this in in case she needs antibiotics for UTI    T36.95XA E930.9    7. Leukocytes in urine R82.99 791.7 CULTURE, URINE   Will send in abx if culture positive. Pt verbalized understanding of their condition and diagnoses, treatment plan,  as well as side effects of any new medications prescribed.

## 2017-11-15 NOTE — PROGRESS NOTES
Chief Complaint   Patient presents with    Urinary Frequency    Diabetes    Hypertension    Cholesterol Problem     1. Have you been to the ER, urgent care clinic since your last visit? Hospitalized since your last visit? No    2. Have you seen or consulted any other health care providers outside of the [address] [place] [address] since your last visit? Include any pap smears or colon screening.  Yes Where: gastro

## 2017-11-16 LAB
ALBUMIN SERPL-MCNC: 4 G/DL (ref 3.5–5.5)
ALBUMIN/GLOB SERPL: 1.1 {RATIO} (ref 1.2–2.2)
ALP SERPL-CCNC: 73 IU/L (ref 39–117)
ALT SERPL-CCNC: 9 IU/L (ref 0–32)
AST SERPL-CCNC: 14 IU/L (ref 0–40)
BILIRUB SERPL-MCNC: 0.4 MG/DL (ref 0–1.2)
BUN SERPL-MCNC: 7 MG/DL (ref 6–24)
BUN/CREAT SERPL: 8 (ref 9–23)
CALCIUM SERPL-MCNC: 9.3 MG/DL (ref 8.7–10.2)
CHLORIDE SERPL-SCNC: 99 MMOL/L (ref 96–106)
CHOLEST SERPL-MCNC: 212 MG/DL (ref 100–199)
CO2 SERPL-SCNC: 26 MMOL/L (ref 18–29)
CREAT SERPL-MCNC: 0.84 MG/DL (ref 0.57–1)
EST. AVERAGE GLUCOSE BLD GHB EST-MCNC: 140 MG/DL
GFR SERPLBLD CREATININE-BSD FMLA CKD-EPI: 80 ML/MIN/1.73
GFR SERPLBLD CREATININE-BSD FMLA CKD-EPI: 92 ML/MIN/1.73
GLOBULIN SER CALC-MCNC: 3.7 G/DL (ref 1.5–4.5)
GLUCOSE SERPL-MCNC: 77 MG/DL (ref 65–99)
HBA1C MFR BLD: 6.5 % (ref 4.8–5.6)
HDLC SERPL-MCNC: 55 MG/DL
LDLC SERPL CALC-MCNC: 132 MG/DL (ref 0–99)
POTASSIUM SERPL-SCNC: 4 MMOL/L (ref 3.5–5.2)
PROT SERPL-MCNC: 7.7 G/DL (ref 6–8.5)
SODIUM SERPL-SCNC: 141 MMOL/L (ref 134–144)
TRIGL SERPL-MCNC: 124 MG/DL (ref 0–149)
VLDLC SERPL CALC-MCNC: 25 MG/DL (ref 5–40)

## 2017-11-17 RX ORDER — ALBUTEROL SULFATE 90 UG/1
2 AEROSOL, METERED RESPIRATORY (INHALATION)
Qty: 1 INHALER | Refills: 5 | Status: SHIPPED | OUTPATIENT
Start: 2017-11-17 | End: 2018-02-13 | Stop reason: SDUPTHER

## 2017-11-19 LAB — BACTERIA UR CULT: ABNORMAL

## 2017-11-20 ENCOUNTER — TELEPHONE (OUTPATIENT)
Dept: INTERNAL MEDICINE CLINIC | Age: 52
End: 2017-11-20

## 2017-11-20 DIAGNOSIS — N39.0 RECURRENT UTI: Primary | ICD-10-CM

## 2017-11-20 RX ORDER — NITROFURANTOIN 25; 75 MG/1; MG/1
100 CAPSULE ORAL 2 TIMES DAILY
Qty: 20 CAP | Refills: 0 | Status: SHIPPED | OUTPATIENT
Start: 2017-11-20 | End: 2018-02-16 | Stop reason: ALTCHOICE

## 2017-11-20 NOTE — TELEPHONE ENCOUNTER
Pt aware she said she already have a urologist she sees once a yr.  She is aware to  abx from pharmacy

## 2017-11-20 NOTE — TELEPHONE ENCOUNTER
I sent her in [facility] for a UTI. Since she seems to have a UTI every time we check, I will send her to urology.

## 2017-12-15 ENCOUNTER — TELEPHONE (OUTPATIENT)
Dept: INTERNAL MEDICINE CLINIC | Age: 52
End: 2017-12-15

## 2017-12-15 NOTE — TELEPHONE ENCOUNTER
Jackie patel called states there will be a Care plan meeting on 12/21 at 10am would like you to dial in for conference call.  [phone] code [phone] care coordinator is  Nj Vasques

## 2018-02-13 ENCOUNTER — OFFICE VISIT (OUTPATIENT)
Dept: INTERNAL MEDICINE CLINIC | Age: 53
End: 2018-02-13

## 2018-02-13 VITALS
HEIGHT: 67 IN | OXYGEN SATURATION: 98 % | HEART RATE: 90 BPM | DIASTOLIC BLOOD PRESSURE: 80 MMHG | SYSTOLIC BLOOD PRESSURE: 126 MMHG | TEMPERATURE: 97.9 F | WEIGHT: 184 LBS | BODY MASS INDEX: 28.88 KG/M2 | RESPIRATION RATE: 18 BRPM

## 2018-02-13 DIAGNOSIS — Z23 ENCOUNTER FOR IMMUNIZATION: ICD-10-CM

## 2018-02-13 DIAGNOSIS — R05.9 COUGH: ICD-10-CM

## 2018-02-13 DIAGNOSIS — E87.6 HYPOKALEMIA: ICD-10-CM

## 2018-02-13 DIAGNOSIS — E11.9 TYPE 2 DIABETES MELLITUS WITHOUT COMPLICATION, WITHOUT LONG-TERM CURRENT USE OF INSULIN (HCC): Primary | ICD-10-CM

## 2018-02-13 DIAGNOSIS — N39.0 RECURRENT UTI: ICD-10-CM

## 2018-02-13 DIAGNOSIS — I10 ESSENTIAL HYPERTENSION, BENIGN: ICD-10-CM

## 2018-02-13 PROBLEM — E11.40 TYPE 2 DIABETES MELLITUS WITH DIABETIC NEUROPATHY (HCC): Status: ACTIVE | Noted: 2018-02-13

## 2018-02-13 RX ORDER — HYDROCHLOROTHIAZIDE 25 MG/1
TABLET ORAL
Qty: 90 TAB | Refills: 1 | Status: SHIPPED | OUTPATIENT
Start: 2018-02-13 | End: 2019-02-18 | Stop reason: SDUPTHER

## 2018-02-13 RX ORDER — POTASSIUM CHLORIDE 750 MG/1
10 TABLET, EXTENDED RELEASE ORAL DAILY
Qty: 90 TAB | Refills: 1 | Status: ON HOLD | OUTPATIENT
Start: 2018-02-13 | End: 2018-04-26

## 2018-02-13 RX ORDER — ALBUTEROL SULFATE 90 UG/1
2 AEROSOL, METERED RESPIRATORY (INHALATION)
Qty: 1 INHALER | Refills: 5 | Status: SHIPPED | OUTPATIENT
Start: 2018-02-13

## 2018-02-13 RX ORDER — ESTRADIOL 0.1 MG/G
CREAM VAGINAL
Qty: 42.5 G | Refills: 10 | Status: CANCELLED | OUTPATIENT
Start: 2018-02-13

## 2018-02-13 RX ORDER — LISINOPRIL 5 MG/1
5 TABLET ORAL DAILY
Qty: 90 TAB | Refills: 1 | Status: SHIPPED | OUTPATIENT
Start: 2018-02-13 | End: 2019-02-18 | Stop reason: SDUPTHER

## 2018-02-13 RX ORDER — MONTELUKAST SODIUM 10 MG/1
TABLET ORAL
Qty: 90 TAB | Refills: 3 | Status: SHIPPED | OUTPATIENT
Start: 2018-02-13 | End: 2018-10-30 | Stop reason: SDUPTHER

## 2018-02-13 RX ORDER — LANCETS
EACH MISCELLANEOUS
Qty: 100 EACH | Refills: 11 | Status: SHIPPED | OUTPATIENT
Start: 2018-02-13

## 2018-02-13 RX ORDER — FLUTICASONE FUROATE AND VILANTEROL 100; 25 UG/1; UG/1
POWDER RESPIRATORY (INHALATION)
Qty: 1 INHALER | Refills: 5 | Status: SHIPPED | OUTPATIENT
Start: 2018-02-13

## 2018-02-13 NOTE — PROGRESS NOTES
HISTORY OF PRESENT ILLNESS  Morgan Corcoran is a 46 y.o. female. HPI Morgan Corcoran is here for follow up on diabetes, HTN and hypercholesterolemia. She is out of potassium and needs a refill. She also tells me she hasn't been using estrace cream b/c its messy. She was put on it by urology for recurrent UTIs. She sees urology again next month, so I advised her she should discuss options with them. She is requesting a flu shot      Review of Systems   Constitutional: Negative for chills, fever and malaise/fatigue. Respiratory: Negative for cough and shortness of breath. Using inhalers, sees pulmonology   Cardiovascular: Negative for chest pain and leg swelling. Genitourinary: Positive for frequency. Physical Exam   Constitutional: She is oriented to person, place, and time. She appears well-developed and well-nourished. No distress. HENT:   Head: Normocephalic and atraumatic. Cardiovascular: Normal rate and regular rhythm. No murmur heard. Pulmonary/Chest: Effort normal and breath sounds normal. She has no wheezes. Musculoskeletal: She exhibits no edema. Neurological: She is alert and oriented to person, place, and time. Skin: No rash noted. Visit Vitals    /80    Pulse 90    Temp 97.9 °F (36.6 °C) (Oral)    Resp 18    Ht 5' 7\" (1.702 m)    Wt 184 lb (83.5 kg)    SpO2 98%    BMI 28.82 kg/m2   Diabetic foot exam:  Sees podiatry    Left: Filament test normal sensation with micro filament   Pulse DP: 2+ (normal)   Pulse PT: 2+ (normal)   Deformities: mild bunion deformity, nail disorder   Right: Filament test normal sensation with micro filament   Pulse DP: 2+ (normal)   Pulse PT: 2+ (normal)   Deformities: bunion deformity, nail disorder. ASSESSMENT and PLAN    ICD-10-CM ICD-9-CM    1.  Type 2 diabetes mellitus without complication, without long-term current use of insulin ([facility]) E11.9 250.00 COLLECTION VENOUS BLOOD,VENIPUNCTURE      METABOLIC PANEL, COMPREHENSIVE LIPID PANEL      HM DIABETES FOOT EXAM      HEMOGLOBIN A1C WITH EAG      SITagliptin-metFORMIN (JANUMET)  mg per tablet      glucose blood VI test strips (BLOOD GLUCOSE TEST) strip      Lancets misc   2. Cough R05 786.2 albuterol (VENTOLIN HFA) 90 mcg/actuation inhaler      montelukast (SINGULAIR) 10 mg tablet      fluticasone-vilanterol (BREO ELLIPTA) 100-25 mcg/dose inhaler   3. Essential hypertension, benign I10 401.1 hydroCHLOROthiazide (HYDRODIURIL) 25 mg tablet      lisinopril (PRINIVIL, ZESTRIL) 5 mg tablet   4. Hypokalemia E87.6 276.8 potassium chloride (KLOR-CON) 10 mEq tablet     Pt verbalized understanding of their condition and diagnoses, treatment plan,  as well as side effects of any new medications prescribed.

## 2018-02-13 NOTE — PROGRESS NOTES
Chief Complaint   Patient presents with    Diabetes    Hypertension     1. Have you been to the ER, urgent care clinic since your last visit? Hospitalized since your last visit? No    2. Have you seen or consulted any other health care providers outside of the [address] [place] [address] since your last visit? Include any pap smears or colon screening.  No

## 2018-02-14 LAB
ALBUMIN SERPL-MCNC: 3.9 G/DL (ref 3.5–5.5)
ALBUMIN/GLOB SERPL: 1.1 {RATIO} (ref 1.2–2.2)
ALP SERPL-CCNC: 82 IU/L (ref 39–117)
ALT SERPL-CCNC: 7 IU/L (ref 0–32)
AST SERPL-CCNC: 11 IU/L (ref 0–40)
BILIRUB SERPL-MCNC: <0.2 MG/DL (ref 0–1.2)
BUN SERPL-MCNC: 8 MG/DL (ref 6–24)
BUN/CREAT SERPL: 10 (ref 9–23)
CALCIUM SERPL-MCNC: 9.3 MG/DL (ref 8.7–10.2)
CHLORIDE SERPL-SCNC: 100 MMOL/L (ref 96–106)
CHOLEST SERPL-MCNC: 208 MG/DL (ref 100–199)
CO2 SERPL-SCNC: 27 MMOL/L (ref 18–29)
CREAT SERPL-MCNC: 0.82 MG/DL (ref 0.57–1)
EST. AVERAGE GLUCOSE BLD GHB EST-MCNC: 126 MG/DL
GFR SERPLBLD CREATININE-BSD FMLA CKD-EPI: 83 ML/MIN/1.73
GFR SERPLBLD CREATININE-BSD FMLA CKD-EPI: 95 ML/MIN/1.73
GLOBULIN SER CALC-MCNC: 3.6 G/DL (ref 1.5–4.5)
GLUCOSE SERPL-MCNC: 129 MG/DL (ref 65–99)
HBA1C MFR BLD: 6 % (ref 4.8–5.6)
HDLC SERPL-MCNC: 47 MG/DL
LDLC SERPL CALC-MCNC: 127 MG/DL (ref 0–99)
POTASSIUM SERPL-SCNC: 4 MMOL/L (ref 3.5–5.2)
PROT SERPL-MCNC: 7.5 G/DL (ref 6–8.5)
SODIUM SERPL-SCNC: 143 MMOL/L (ref 134–144)
TRIGL SERPL-MCNC: 172 MG/DL (ref 0–149)
VLDLC SERPL CALC-MCNC: 34 MG/DL (ref 5–40)

## 2018-02-15 LAB — BACTERIA UR CULT: ABNORMAL

## 2018-02-16 ENCOUNTER — TELEPHONE (OUTPATIENT)
Dept: INTERNAL MEDICINE CLINIC | Age: 53
End: 2018-02-16

## 2018-02-16 RX ORDER — CIPROFLOXACIN 500 MG/1
500 TABLET ORAL 2 TIMES DAILY
Qty: 6 TAB | Refills: 0 | Status: SHIPPED | OUTPATIENT
Start: 2018-02-16 | End: 2018-02-19

## 2018-02-16 NOTE — TELEPHONE ENCOUNTER
Her urine culture was positive again. I sent her in 3 days of cipro. She should follow up with urology for recurrent UTIs.

## 2018-04-26 ENCOUNTER — APPOINTMENT (OUTPATIENT)
Dept: CT IMAGING | Age: 53
End: 2018-04-26
Attending: ORTHOPAEDIC SURGERY
Payer: MEDICARE

## 2018-04-26 ENCOUNTER — HOSPITAL ENCOUNTER (OUTPATIENT)
Dept: GENERAL RADIOLOGY | Age: 53
Discharge: HOME | End: 2018-04-26
Attending: ORTHOPAEDIC SURGERY | Admitting: RADIOLOGY
Payer: MEDICARE

## 2018-04-26 VITALS
OXYGEN SATURATION: 100 % | RESPIRATION RATE: 16 BRPM | TEMPERATURE: 98.4 F | WEIGHT: 187.31 LBS | DIASTOLIC BLOOD PRESSURE: 87 MMHG | BODY MASS INDEX: 29.34 KG/M2 | HEART RATE: 63 BPM | SYSTOLIC BLOOD PRESSURE: 144 MMHG

## 2018-04-26 DIAGNOSIS — M54.50 LUMBAGO: ICD-10-CM

## 2018-04-26 DIAGNOSIS — M54.16 LUMBAR RADICULOPATHY: ICD-10-CM

## 2018-04-26 LAB — GLUCOSE BLD STRIP.AUTO-MCNC: 106 MG/DL (ref 70–110)

## 2018-04-26 PROCEDURE — 82962 GLUCOSE BLOOD TEST: CPT

## 2018-04-26 PROCEDURE — 74011636320 HC RX REV CODE- 636/320: Performed by: ORTHOPAEDIC SURGERY

## 2018-04-26 PROCEDURE — 74011250637 HC RX REV CODE- 250/637: Performed by: RADIOLOGY

## 2018-04-26 PROCEDURE — 74011000250 HC RX REV CODE- 250: Performed by: ORTHOPAEDIC SURGERY

## 2018-04-26 PROCEDURE — 62304 MYELOGRAPHY LUMBAR INJECTION: CPT

## 2018-04-26 PROCEDURE — 72132 CT LUMBAR SPINE W/DYE: CPT

## 2018-04-26 RX ORDER — PHENOL/SODIUM PHENOLATE
20 AEROSOL, SPRAY (ML) MUCOUS MEMBRANE DAILY
COMMUNITY
End: 2018-05-10

## 2018-04-26 RX ORDER — HYDROCODONE BITARTRATE AND ACETAMINOPHEN 5; 325 MG/1; MG/1
1 TABLET ORAL
Status: DISCONTINUED | OUTPATIENT
Start: 2018-04-26 | End: 2018-04-26 | Stop reason: HOSPADM

## 2018-04-26 RX ORDER — LIDOCAINE HYDROCHLORIDE 10 MG/ML
1-5 INJECTION, SOLUTION EPIDURAL; INFILTRATION; INTRACAUDAL; PERINEURAL
Status: COMPLETED | OUTPATIENT
Start: 2018-04-26 | End: 2018-04-26

## 2018-04-26 RX ADMIN — IOPAMIDOL 20 ML: 408 INJECTION, SOLUTION INTRATHECAL at 10:49

## 2018-04-26 RX ADMIN — LIDOCAINE HYDROCHLORIDE 3 ML: 10 INJECTION, SOLUTION EPIDURAL; INFILTRATION; INTRACAUDAL; PERINEURAL at 10:45

## 2018-04-26 RX ADMIN — HYDROCODONE BITARTRATE AND ACETAMINOPHEN 1 TABLET: 5; 325 TABLET ORAL at 11:41

## 2018-04-26 NOTE — DISCHARGE INSTRUCTIONS
Myelogram: About This Test  What is it? A myelogram uses X-rays and a special dye to make pictures of bones and nerves of the spine (spinal canal). The spinal canal holds the spinal cord, the spinal nerve roots, and the fluid-filled space between the bones in your spine. Why is this test done? A myelogram is done to check for:  · The cause of arm or leg numbness, weakness, or pain. · Narrowing of the spinal canal (spinal stenosis). · A tumor or infection causing problems with the spinal cord or nerve roots. · A spinal disc that has ruptured (herniated disc). · Inflammation of the membrane that covers the brain and spinal cord. · Problems with the blood vessels to the spine. How can you prepare for the test?  Your doctor will tell you if you need to change how much you eat and drink before the myelogram. You may be asked to increase the amount of water you drink before the test. Follow the instructions your doctor gives you about eating and drinking. Before a myelogram, tell your doctor if:  · You are allergic to any medicines, contrast material, or iodine dye. · You have had bleeding problems, or you take a blood thinner, such as aspirin, clopidogrel (Plavix), or warfarin (Coumadin), or you take over-the-counter medicines, such as ibuprofen (Advil, Motrin) or naproxen (Aleve). Your doctor will tell you when you should stop taking these medicines several days before your procedure. Make sure that you understand exactly what he or she wants you to do. · You have had kidney problems. · You have diabetes, especially if you take metformin (Glucophage, for example). · You are or might be pregnant. Ask someone to take you home and stay with you after the test.  What happens during the test?  The dye is put into your spinal canal with a thin needle. This is called a lumbar puncture. The dye moves through the space so the nerve roots and spinal cord can be seen more clearly.  After the dye is put in, you will lie still while the X-ray pictures are taken. How does it feel? You will feel a quick sting from a small needle that has medicine to numb the skin on your back. You will also feel some pressure as the long, thin spinal needle is put into your spinal canal. You may feel a quick sharp pain down your buttock or leg when the needle is moved in your spine. The dye may make you feel warm and flushed and have a metallic taste in your mouth. What else should you know about the test?  In rare cases, the hole made by the needle in the sac around the spine does not close normally. This can allow spinal fluid to leak out. This leak may need to be repaired through a procedure called an epidural blood patch. To do the patch, your doctor injects some of your own blood to cover the hole. How long does the test take? · A myelogram usually takes 30 minutes to 1 hour. What happens after the test?  · You will probably be able to go home 30 minutes to 2 hours after the test.  · You may need to lie in bed with your head raised for 4 to 24 hours after the test. To prevent seizures, which are a rare side effect, do not bend over or lie down with your head lower than your body. Keeping your head higher than your body after a myelogram also may help prevent or reduce other side effects, such as headache, nausea, or vomiting. · Avoid strenuous activity, such as running or heavy lifting, for at least 1 day after your myelogram.  · Drink plenty of water after the myelogram. Your doctor will give you instructions on taking your regular medicines. When should you call for help? Call 911 anytime you think you may need emergency care. For example, call if:  · You have a seizure. Call your doctor now or seek immediate medical care if:  · You have any increase in pain, weakness, or numbness in your legs. · You have a severe headache or stiff neck, or your eyes become very sensitive to light.   · You have a headache that lasts longer than 24 hours. · You have problems urinating or having a bowel movement. · You have a fever. Follow-up care is a key part of your treatment and safety. Be sure to make and go to all appointments, and call your doctor if you are having problems. It's also a good idea to keep a list of the medicines you take. Ask your doctor when you can expect to have your test results. Where can you learn more? Go to http://marisela-darryn.info/. Enter W662 in the search box to learn more about \"Myelogram: About This Test.\"  Current as of: October 14, 2016  Content Version: 11.4  © 7084-6177 [employer]. Care instructions adapted under license by [employer] (which disclaims liability or warranty for this information). If you have questions about a medical condition or this instruction, always ask your healthcare professional. [place] [address] any warranty or liability for your use of this information. DISCHARGE SUMMARY from Nurse    PATIENT INSTRUCTIONS:    After general anesthesia or intravenous sedation, for 24 hours or while taking prescription Narcotics:  · Limit your activities  · Do not drive and operate hazardous machinery  · Do not make important personal or business decisions  · Do  not drink alcoholic beverages  · If you have not urinated within 8 hours after discharge, please contact your surgeon on call.     Report the following to your surgeon:  · Excessive pain, swelling, redness or odor of or around the surgical area  · Temperature over 100.5  · Nausea and vomiting lasting longer than 4 hours or if unable to take medications  · Any signs of decreased circulation or nerve impairment to extremity: change in color, persistent  numbness, tingling, coldness or increase pain  · Any questions    What to do at Home:  No smoking/ No tobacco products/ Avoid exposure to second hand smoke  Surgeon General's Warning:  Quitting smoking now greatly reduces serious risk to your health. Obesity, smoking, and sedentary lifestyle greatly increases your risk for illness    A healthy diet, regular physical exercise & weight monitoring are important for maintaining a healthy lifestyle    You may be retaining fluid if you have a history of heart failure or if you experience any of the following symptoms:  Weight gain of 3 pounds or more overnight or 5 pounds in a week, increased swelling in our hands or feet or shortness of breath while lying flat in bed. Please call your doctor as soon as you notice any of these symptoms; do not wait until your next office visit. Recognize signs and symptoms of STROKE:    F-face looks uneven    A-arms unable to move or move unevenly    S-speech slurred or non-existent    T-time-call 911 as soon as signs and symptoms begin-DO NOT go       Back to bed or wait to see if you get better-TIME IS BRAIN. Warning Signs of HEART ATTACK     Call 911 if you have these symptoms:   Chest discomfort. Most heart attacks involve discomfort in the center of the chest that lasts more than a few minutes, or that goes away and comes back. It can feel like uncomfortable pressure, squeezing, fullness, or pain.  Discomfort in other areas of the upper body. Symptoms can include pain or discomfort in one or both arms, the back, neck, jaw, or stomach.  Shortness of breath with or without chest discomfort.  Other signs may include breaking out in a cold sweat, nausea, or lightheadedness. Don't wait more than five minutes to call 911 - MINUTES MATTER! Fast action can save your life. Calling 911 is almost always the fastest way to get lifesaving treatment. Emergency Medical Services staff can begin treatment when they arrive -- up to an hour sooner than if someone gets to the hospital by car. The discharge information has been reviewed with the patient. The patient verbalized understanding.   Discharge medications reviewed with the patient and appropriate educational materials and side effects teaching were provided. Patient armband removed and given to patient to take home.   Patient was informed of the privacy risks if armband lost or stolen

## 2018-04-26 NOTE — PROCEDURES
Vascular & Interventional Radiology Brief Procedure Note    Interventional Radiologist: Nghia Rendon MD    Assistants: None    Pre-operative Diagnosis:  Back pain left radiculopathy, prior fusion    Post-operative Diagnosis: Same as pre-op dx    Procedure(s) Performed:  Lumbar myelogram    Anesthesia:  Local    Findings:  Contrast in thecal sac, see dictations    Complications: None    Estimated Blood Loss:  minimal    Tubes and Drains: None    Specimens: None    Condition: Good    Disposition:  CT    Plan: Caitlyn Guardado  Vascular & Interventional Radiology  4/26/2018

## 2018-04-26 NOTE — IP AVS SNAPSHOT
[address] Patient: Mary Kay Amezquita MRN: [id number] HXE:[id number] About your hospitalization You were admitted on:  April 26, 2018 You last received care in the:  [address] [place] [address] You were discharged on:  April 26, 2018 Why you were hospitalized Your primary diagnosis was:  Not on File Follow-up Information Follow up With Details Comments Contact Info Isaías Olivera PA-C   200 [facility] [address] [id number] 
[phone] Your Scheduled Appointments Thursday May 10, 2018  9:00 AM EDT  
ESTABLISHED PATIENT with Alejandro Askew MD  
[facility] Christina Degroot) [address] [facility] [address] [facility] [address]  
[phone] Monday May 14, 2018  9:00 AM EDT Office Visit with Isaías Olivera PA-C [facility] [place] Christina Degroot)  
 [facility] [address] [facility] [address] [phone] Discharge Orders None A check josefina indicates which time of day the medication should be taken. My Medications CONTINUE taking these medications Instructions Each Dose to Equal  
 Morning Noon Evening Bedtime ABILIFY 30 mg tablet Generic drug:  ARIPiprazole Your last dose was: Your next dose is:    
   
   
      
   
   
   
  
 * albuterol 90 mcg/actuation inhaler Commonly known as:  VENTOLIN HFA Your last dose was: Your next dose is: Take 2 Puffs by inhalation every six (6) hours as needed for Wheezing. 2 Puff * PROAIR RESPICLICK 90 mcg/actuation Aepb Generic drug:  albuterol sulfate Your last dose was: Your next dose is:    
   
   
 INL 2 PFS PO Q 6 H PRF WHZ  
     
   
   
   
  
 diazePAM 10 mg tablet Commonly known as:  VALIUM Your last dose was: Your next dose is: Take 10 mg by mouth every six (6) hours as needed. 10 mg  
    
   
   
   
  
 fluticasone-vilanterol 100-25 mcg/dose inhaler Commonly known as:  BREO ELLIPTA Your last dose was: Your next dose is:    
   
   
 INHALE ONE PUFF BY MOUTH DAILY  
     
   
   
   
  
 gabapentin 600 mg tablet Commonly known as:  NEURONTIN Your last dose was: Your next dose is: Take  by mouth two (2) times a day. glucose blood VI test strips strip Commonly known as:  blood glucose test  
   
Your last dose was: Your next dose is:    
   
   
 True resultTest blood glucose daily  
     
   
   
   
  
 hydroCHLOROthiazide 25 mg tablet Commonly known as:  HYDRODIURIL Your last dose was: Your next dose is: TAKE 1 TABLET BY MOUTH DAILY INCRUSE ELLIPTA 62.5 mcg/actuation inhaler Generic drug:  umeclidinium Your last dose was: Your next dose is: INL 1 PUFF PO QD * Lancets Misc Your last dose was: Your next dose is:    
   
   
 True results lancets Test glucose daily * TRUEPLUS LANCETS 30 gauge Misc Generic drug:  lancets Your last dose was: Your next dose is:    
   
   
 TEST GLUCOSE ONCE  DAILY LEXAPRO 20 mg tablet Generic drug:  escitalopram oxalate Your last dose was: Your next dose is: Take 20 mg by mouth daily. 20 mg  
    
   
   
   
  
 lisinopril 5 mg tablet Commonly known as:  Milagro Aragon Your last dose was: Your next dose is: Take 1 Tab by mouth daily. 5 mg  
    
   
   
   
  
 montelukast 10 mg tablet Commonly known as:  SINGULAIR Your last dose was: Your next dose is: TAKE 1 TABLET BY MOUTH DAILY  
     
   
   
   
  
 NUCYNTA 100 mg tablet Generic drug:  tapentadol Your last dose was: Your next dose is: Take 100 mg by mouth every six (6) hours as needed. 100 mg Omeprazole delayed release 20 mg tablet Commonly known as:  PRILOSEC D/R Your last dose was: Your next dose is: Take 20 mg by mouth daily. 20 mg SITagliptin-metFORMIN  mg per tablet Commonly known as:  Reshma Bijou Your last dose was: Your next dose is: Take 1 Tab by mouth two (2) times daily (with meals). 1 Tab * Notice: This list has 4 medication(s) that are the same as other medications prescribed for you. Read the directions carefully, and ask your doctor or other care provider to review them with you. Opioid Education Prescription Opioids: What You Need to Know: 
 
Prescription opioids can be used to help relieve moderate-to-severe pain and are often prescribed following a surgery or injury, or for certain health conditions. These medications can be an important part of treatment but also come with serious risks. Opioids are strong pain medicines. Examples include hydrocodone, oxycodone, fentanyl, and morphine. Heroin is an example of an illegal opioid. It is important to work with your health care provider to make sure you are getting the safest, most effective care. WHAT ARE THE RISKS AND SIDE EFFECTS OF OPIOID USE? Prescription opioids carry serious risks of addiction and overdose, especially with prolonged use. An opioid overdose, often marked by slow breathing, can cause sudden death. The use of prescription opioids can have a number of side effects as well, even when taken as directed. · Tolerance-meaning you might need to take more of a medication for the same pain relief · Physical dependence-meaning you have symptoms of withdrawal when the medication is stopped. Withdrawal symptoms can include nausea, sweating, chills, diarrhea, stomach cramps, and muscle aches. Withdrawal can last up to several weeks, depending on which drug you took and how long you took it. · Increased sensitivity to pain · Constipation · Nausea, vomiting, and dry mouth · Sleepiness and dizziness · Confusion · Depression · Low levels of testosterone that can result in lower sex drive, energy, and strength · Itching and sweating RISKS ARE GREATER WITH:      
· History of drug misuse, substance use disorder, or overdose · Mental health conditions (such as depression or anxiety) · Sleep apnea · Older age (72 years or older) · Pregnancy Avoid alcohol while taking prescription opioids. Also, unless specifically advised by your health care provider, medications to avoid include: · Benzodiazepines (such as Xanax or Valium) · Muscle relaxants (such as Soma or Flexeril) · Hypnotics (such as Ambien or Lunesta) · Other prescription opioids KNOW YOUR OPTIONS Talk to your health care provider about ways to manage your pain that don't involve prescription opioids. Some of these options may actually work better and have fewer risks and side effects. Options may include: 
· Pain relievers such as acetaminophen, ibuprofen, and naproxen · Some medications that are also used for depression or seizures · Physical therapy and exercise · Counseling to help patients learn how to cope better with triggers of pain and stress. · Application of heat or cold compress · Massage therapy · Relaxation techniques Be Informed Make sure you know the name of your medication, how much and how often to take it, and its potential risks & side effects. IF YOU ARE PRESCRIBED OPIOIDS FOR PAIN: 
· Never take opioids in greater amounts or more often than prescribed. Remember the goal is not to be pain-free but to manage your pain at a tolerable level. · Follow up with your primary care provider to: · Work together to create a plan on how to manage your pain. · Talk about ways to help manage your pain that don't involve prescription opioids. · Talk about any and all concerns and side effects. · Help prevent misuse and abuse. · Never sell or share prescription opioids · Help prevent misuse and abuse. · Store prescription opioids in a secure place and out of reach of others (this may include visitors, children, friends, and family). · Safely dispose of unused/unwanted prescription opioids: Find your community drug take-back program or your pharmacy mail-back program, or flush them down the toilet, following guidance from the Food and Drug Administration (www.fda.gov/Drugs/ResourcesForYou). · Visit www.cdc.gov/drugoverdose to learn about the risks of opioid abuse and overdose. · If you believe you may be struggling with addiction, tell your health care provider and ask for guidance or call [facility] at 2-460-186-RLST. Discharge Instructions Myelogram: About This Test 
What is it? A myelogram uses X-rays and a special dye to make pictures of bones and nerves of the spine (spinal canal). The spinal canal holds the spinal cord, the spinal nerve roots, and the fluid-filled space between the bones in your spine. Why is this test done? A myelogram is done to check for: · The cause of arm or leg numbness, weakness, or pain. · Narrowing of the spinal canal (spinal stenosis). · A tumor or infection causing problems with the spinal cord or nerve roots. · A spinal disc that has ruptured (herniated disc). · Inflammation of the membrane that covers the brain and spinal cord. · Problems with the blood vessels to the spine.  
How can you prepare for the test? 
Your doctor will tell you if you need to change how much you eat and drink before the myelogram. You may be asked to increase the amount of water you drink before the test. Follow the instructions your doctor gives you about eating and drinking. Before a myelogram, tell your doctor if: 
· You are allergic to any medicines, contrast material, or iodine dye. · You have had bleeding problems, or you take a blood thinner, such as aspirin, clopidogrel (Plavix), or warfarin (Coumadin), or you take over-the-counter medicines, such as ibuprofen (Advil, Motrin) or naproxen (Aleve). Your doctor will tell you when you should stop taking these medicines several days before your procedure. Make sure that you understand exactly what he or she wants you to do. · You have had kidney problems. · You have diabetes, especially if you take metformin (Glucophage, for example). · You are or might be pregnant. Ask someone to take you home and stay with you after the test. 
What happens during the test? 
The dye is put into your spinal canal with a thin needle. This is called a lumbar puncture. The dye moves through the space so the nerve roots and spinal cord can be seen more clearly. After the dye is put in, you will lie still while the X-ray pictures are taken. How does it feel? You will feel a quick sting from a small needle that has medicine to numb the skin on your back. You will also feel some pressure as the long, thin spinal needle is put into your spinal canal. You may feel a quick sharp pain down your buttock or leg when the needle is moved in your spine. The dye may make you feel warm and flushed and have a metallic taste in your mouth. What else should you know about the test? 
In rare cases, the hole made by the needle in the sac around the spine does not close normally. This can allow spinal fluid to leak out. This leak may need to be repaired through a procedure called an epidural blood patch. To do the patch, your doctor injects some of your own blood to cover the hole. How long does the test take? · A myelogram usually takes 30 minutes to 1 hour. What happens after the test? 
· You will probably be able to go home 30 minutes to 2 hours after the test. 
· You may need to lie in bed with your head raised for 4 to 24 hours after the test. To prevent seizures, which are a rare side effect, do not bend over or lie down with your head lower than your body. Keeping your head higher than your body after a myelogram also may help prevent or reduce other side effects, such as headache, nausea, or vomiting. · Avoid strenuous activity, such as running or heavy lifting, for at least 1 day after your myelogram. 
· Drink plenty of water after the myelogram. Your doctor will give you instructions on taking your regular medicines. When should you call for help? Call 911 anytime you think you may need emergency care. For example, call if: 
· You have a seizure. Call your doctor now or seek immediate medical care if: 
· You have any increase in pain, weakness, or numbness in your legs. · You have a severe headache or stiff neck, or your eyes become very sensitive to light. · You have a headache that lasts longer than 24 hours. · You have problems urinating or having a bowel movement. · You have a fever. Follow-up care is a key part of your treatment and safety. Be sure to make and go to all appointments, and call your doctor if you are having problems. It's also a good idea to keep a list of the medicines you take. Ask your doctor when you can expect to have your test results. Where can you learn more? Go to http://marisela-darryn.info/. Enter U409 in the search box to learn more about \"Myelogram: About This Test.\" Current as of: October 14, 2016 Content Version: 11.4 © 7306-3884 Healthwise, [employer].  Care instructions adapted under license by [employer] (which disclaims liability or warranty for this information). If you have questions about a medical condition or this instruction, always ask your healthcare professional. [address] any warranty or liability for your use of this information. DISCHARGE SUMMARY from Nurse PATIENT INSTRUCTIONS: 
 
After general anesthesia or intravenous sedation, for 24 hours or while taking prescription Narcotics: · Limit your activities · Do not drive and operate hazardous machinery · Do not make important personal or business decisions · Do  not drink alcoholic beverages · If you have not urinated within 8 hours after discharge, please contact your surgeon on call. Report the following to your surgeon: 
· Excessive pain, swelling, redness or odor of or around the surgical area · Temperature over 100.5 · Nausea and vomiting lasting longer than 4 hours or if unable to take medications · Any signs of decreased circulation or nerve impairment to extremity: change in color, persistent  numbness, tingling, coldness or increase pain · Any questions What to do at Home: No smoking/ No tobacco products/ Avoid exposure to second hand smoke Surgeon General's Warning:  Quitting smoking now greatly reduces serious risk to your health. Obesity, smoking, and sedentary lifestyle greatly increases your risk for illness A healthy diet, regular physical exercise & weight monitoring are important for maintaining a healthy lifestyle You may be retaining fluid if you have a history of heart failure or if you experience any of the following symptoms:  Weight gain of 3 pounds or more overnight or 5 pounds in a week, increased swelling in our hands or feet or shortness of breath while lying flat in bed. Please call your doctor as soon as you notice any of these symptoms; do not wait until your next office visit. Recognize signs and symptoms of STROKE: 
 
F-face looks uneven A-arms unable to move or move unevenly S-speech slurred or non-existent T-time-call 911 as soon as signs and symptoms begin-DO NOT go Back to bed or wait to see if you get better-TIME IS BRAIN. Warning Signs of HEART ATTACK Call 911 if you have these symptoms: 
? Chest discomfort. Most heart attacks involve discomfort in the center of the chest that lasts more than a few minutes, or that goes away and comes back. It can feel like uncomfortable pressure, squeezing, fullness, or pain. ? Discomfort in other areas of the upper body. Symptoms can include pain or discomfort in one or both arms, the back, neck, jaw, or stomach. ? Shortness of breath with or without chest discomfort. ? Other signs may include breaking out in a cold sweat, nausea, or lightheadedness. Don't wait more than five minutes to call [address]! Fast action can save your life. Calling 911 is almost always the fastest way to get lifesaving treatment. Emergency Medical Services staff can begin treatment when they arrive  up to an hour sooner than if someone gets to the hospital by car. The discharge information has been reviewed with the patient. The patient verbalized understanding. Discharge medications reviewed with the patient and appropriate educational materials and side effects teaching were provided. Patient armband removed and given to patient to take home. Patient was informed of the privacy risks if armband lost or stolen ACO Transitions of Care [facility] offers a voluntary care coordination program to provide high quality service and care to [facility] fee-for-service beneficiaries. Hayley Becka was designed to help you enhance your health and well-being through the following services: ? Transitions of Care  support for individuals who are transitioning from one care setting to another (example: Hospital to home). ? Chronic and Complex Care Coordination  support for individuals and caregivers of those with serious or chronic illnesses or with more than one chronic (ongoing) condition and those who take a number of different medications. If you meet specific medical criteria, a [address] [place] [address] Rd may call you directly to coordinate your care with your primary care physician and your other care providers. For questions about the [facility] programs, please, contact your physicians office. For general questions or additional information about Accountable Care Organizations: 
Please visit www.medicare.gov/acos. html or call 1-800-MEDICARE (0-620.760.3204) TTY users should call 6-886.646.2788. Introducing [facility]! [address] [facility] introduces [employer] patient portal. Now you can access parts of your medical record, email your doctor's office, and request medication refills online. 1. In your internet browser, go to [email or web address] 2. Click on the First Time User? Click Here link in the Sign In box. You will see the New Member Sign Up page. 3. Enter your [employer] Access Code exactly as it appears below. You will not need to use this code after youve completed the sign-up process. If you do not sign up before the expiration date, you must request a new code. · [employer] Access Code: [email or web address] Expires: 5/22/2018  4:37 PM 
 
4. Enter the last four digits of your Social Security Number (xxxx) and Date of Birth (mm/dd/yyyy) as indicated and click Submit. You will be taken to the next sign-up page. 5. Create a [employer]t ID. This will be your [employer] login ID and cannot be changed, so think of one that is secure and easy to remember. 6. Create a [employer] password. You can change your password at any time. 7. Enter your Password Reset Question and Answer. This can be used at a later time if you forget your password. 8. Enter your e-mail address. You will receive e-mail notification when new information is available in [address] 9. Click Sign Up. You can now view and download portions of your medical record. 10. Click the Download Summary menu link to download a portable copy of your medical information. If you have questions, please visit the Frequently Asked Questions section of the [employer] website. Remember, [facility] is NOT to be used for urgent needs. For medical emergencies, dial 911. Now available from your iPhone and Android! Introducing Perfecto Segura As a [facility] patient, I wanted to make you aware of our electronic visit tool called Perfecto Segura. Vivi Old 24/7 allows you to connect within minutes with a medical provider 24 hours a day, seven days a week via a mobile device or tablet or logging into a secure website from your computer. You can access Perfecto Segura from anywhere in the [place]. A virtual visit might be right for you when you have a simple condition and feel like you just dont want to get out of bed, or cant get away from work for an appointment, when your regular [facility] provider is not available (evenings, weekends or holidays), or when youre out of town and need minor care. Electronic visits cost only $49 and if the [facility] 24/7 provider determines a prescription is needed to treat your condition, one can be electronically transmitted to a nearby pharmacy*. Please take a moment to enroll today if you have not already done so. The enrollment process is free and takes just a few minutes. To enroll, please download the Vivi Old 24/7 mitul to your tablet or phone, or visit [email or web address] to enroll on your computer.    
And, as an [address] [place] [address] patient with a [employer] account, the results of your visits will be scanned into your electronic medical record and your primary care provider will be able to view the scanned results. We urge you to continue to see your regular [facility] provider for your ongoing medical care. And while your primary care provider may not be the one available when you seek a Perfecto Rodriguezcielofin virtual visit, the peace of mind you get from getting a real diagnosis real time can be priceless. For more information on "[employer]cielofin, view our Frequently Asked Questions (FAQs) at [email or web address]. Sincerely, 
 
Ruth Horn MD 
Chief Medical Officer [address] *:  certain medications cannot be prescribed via "[employer]cieloRedicam Unresulted Labs-Please follow up with your PCP about these lab tests Order Current Status CT SPINE LUMB W CONT In process XR MYELO LUMBAR In process Providers Seen During Your Hospitalization Provider Specialty Primary office phone Matthew Scott MD Orthopedic Surgery [phone] Your Primary Care Physician (PCP) Primary Care Physician Office Phone Office Fax Ezzamariusz Navneet [phone] You are allergic to the following Allergen Reactions Aspirin Hives Bactrim (Sulfamethoprim Ds) Swelling Recent Documentation Weight BMI OB Status Smoking Status 85 kg 29.34 kg/m2 Postmenopausal Current Every Day Smoker Emergency Contacts Name Discharge Info Relation Home Work Mobile JaylynSelvin N/A  AT THIS TIME [6] Child [2] [phone] [facility] FACILITY DISCHARGE CAREGIVER [3] Sister [23]   [phone] Patient Belongings The following personal items are in your possession at time of discharge: 
  Dental Appliances: Lowers, Partials, Uppers         Home Medications: None   Jewelry: Necklace, Body Piercing, With patient (necklace given to sister)  Clothing: Pants, Shirt, Footwear, Undergarments    Other Valuables: Purse, Eyeglasses (purse given to sister, clothes locked in store room) Please provide this summary of care documentation to your next provider. Signatures-by signing, you are acknowledging that this After Visit Summary has been reviewed with you and you have received a copy. Patient Signature:  ____________________________________________________________ Date:  ____________________________________________________________  
  
Gearldine Moulds Provider Signature:  ____________________________________________________________ Date:  ____________________________________________________________

## 2018-05-14 ENCOUNTER — OFFICE VISIT (OUTPATIENT)
Dept: INTERNAL MEDICINE CLINIC | Age: 53
End: 2018-05-14

## 2018-05-14 VITALS
BODY MASS INDEX: 29.03 KG/M2 | HEIGHT: 67 IN | WEIGHT: 185 LBS | TEMPERATURE: 98.1 F | OXYGEN SATURATION: 99 % | RESPIRATION RATE: 18 BRPM | HEART RATE: 67 BPM | SYSTOLIC BLOOD PRESSURE: 126 MMHG | DIASTOLIC BLOOD PRESSURE: 82 MMHG

## 2018-05-14 DIAGNOSIS — E11.40 TYPE 2 DIABETES MELLITUS WITH DIABETIC NEUROPATHY, WITHOUT LONG-TERM CURRENT USE OF INSULIN (HCC): Primary | ICD-10-CM

## 2018-05-14 DIAGNOSIS — F17.200 CURRENT SMOKER: ICD-10-CM

## 2018-05-14 DIAGNOSIS — Z00.00 MEDICARE ANNUAL WELLNESS VISIT, SUBSEQUENT: ICD-10-CM

## 2018-05-14 DIAGNOSIS — F31.60 BIPOLAR AFFECTIVE DISORDER, CURRENT EPISODE MIXED, CURRENT EPISODE SEVERITY UNSPECIFIED (HCC): ICD-10-CM

## 2018-05-14 LAB
CREATININE(CRT),U, 723280: 50 MG/DL
MICROALBUMIN UR TEST STR-MCNC: 10 MG/L
MICROALBUMIN/CREAT RATIO POC: <30 MG/G

## 2018-05-14 RX ORDER — VARENICLINE TARTRATE 25 MG
KIT ORAL
Qty: 1 DOSE PACK | Refills: 0 | Status: SHIPPED | OUTPATIENT
Start: 2018-05-14 | End: 2018-08-06 | Stop reason: SDDI

## 2018-05-14 NOTE — MR AVS SNAPSHOT
[address] [facility] 84 [address] [facility] [address] 
[phone] Patient: Ernst Arellano MRN: [id number] EAO:1[birth year]30 Visit Information Date & Time Provider Department Dept. Phone Encounter #  
 5/14/2018  9:00 AM Carrington Campuzano PA-C [facility] [phone] [id number] Follow-up Instructions Return in about 3 months (around 8/14/2018) for Combo. Follow-up and Disposition History Your Appointments 6/7/2018  9:00 AM  
ESTABLISHED PATIENT with Jaclyn Sargent MD  
[facility] [address]) Appt Note: cysto per Dr Rodriguez Body  
 [address]  
  
   
 [address], [address] [facility]  
  
    
 7/17/2018 10:00 AM  
PHYSICAL PRE OP with Carrington aCmpuzano PA-C [facility] [place] [address] [facility]) Appt Note: PreOp Lumbar side vusion revision 8/1/18 Dr Lorenzo Alvarado, [facility] [phone], Fax [phone]) PT going to [facility] to get labs etc  
 [address] [place] [address] 110 [address] [facility] [address]  
[phone]  
  
   
 [address] [facility] [address] [facility] [address] Upcoming Health Maintenance Date Due  
 EYE EXAM RETINAL OR DILATED Q1 4/10/2018 Influenza Age 5 to Adult 8/1/2018 HEMOGLOBIN A1C Q6M 8/13/2018 BREAST CANCER SCRN MAMMOGRAM 9/26/2018 FOOT EXAM Q1 2/13/2019 LIPID PANEL Q1 2/13/2019 MICROALBUMIN Q1 5/14/2019 MEDICARE YEARLY EXAM 5/15/2019 PAP AKA CERVICAL CYTOLOGY 5/19/2019 COLONOSCOPY 5/14/2022 DTaP/Tdap/Td series (2 - Td) 7/14/2026 Allergies as of 5/14/2018  Review Complete On: 5/14/2018 By: Carringotn Campuzano PA-C Severity Noted Reaction Type Reactions Aspirin  10/25/2012    Hives Bactrim [Sulfamethoprim Ds]  10/25/2012    Swelling Current Immunizations  Never Reviewed Name Date Influenza Vaccine 9/10/2015, 10/16/2014, 11/1/2013 Influenza Vaccine (Quad) PF 2/13/2018, 11/14/2016 Pneumococcal Polysaccharide (PPSV-23) 11/1/2013 Td, Adsorbed PF 9/10/2015 Zoster Vaccine, Live 7/1/2015 Not reviewed this visit You Were Diagnosed With   
  
 Codes Comments Type 2 diabetes mellitus with diabetic neuropathy, without long-term current use of insulin ([facility])    -  Primary ICD-10-CM: E11.40 ICD-9-CM: 250.60, 357.2 Current smoker     ICD-10-CM: F17.200 ICD-9-CM: 305.1 Bipolar affective disorder, current episode mixed, current episode severity unspecified ([facility] [address]     ICD-10-CM: F31.60 ICD-9-CM: 296.60 Medicare annual wellness visit, subsequent     ICD-10-CM: Z00.00 ICD-9-CM: V70.0 Vitals BP Pulse Temp Resp Height(growth percentile) Weight(growth percentile) 126/82 (BP 1 Location: Left arm, BP Patient Position: Sitting) 67 98.1 °F (36.7 °C) (Oral) 18 5' 7\" (1.702 m) 185 lb (83.9 kg) SpO2 BMI OB Status Smoking Status 99% 28.98 kg/m2 Postmenopausal Current Every Day Smoker BMI and BSA Data Body Mass Index Body Surface Area  
 28.98 kg/m 2 1.99 m 2 Preferred Pharmacy Pharmacy Name Phone [address] , [address] [place] [address] [phone] Your Updated Medication List  
  
   
This list is accurate as of 5/14/18  3:34 PM.  Always use your most recent med list.  
  
  
  
  
 ABILIFY 30 mg tablet Generic drug:  ARIPiprazole * albuterol 90 mcg/actuation inhaler Commonly known as:  VENTOLIN HFA Take 2 Puffs by inhalation every six (6) hours as needed for Wheezing. * PROAIR RESPICLICK 90 mcg/actuation Aepb Generic drug:  albuterol sulfate INL 2 PFS PO Q 6 H PRF WHZ  
  
 amoxicillin-clavulanate 875-125 mg per tablet Commonly known as:  AUGMENTIN Take 1 Tab by mouth every twelve (12) hours for 7 days. diazePAM 10 mg tablet Commonly known as:  VALIUM Take 10 mg by mouth every six (6) hours as needed. estradiol 0.01 % (0.1 mg/gram) vaginal cream  
Commonly known as:  ESTRACE  
APPLY 1 FINGER TIP OF MEDICATION TO VULVA EVERY DAY. fluticasone-vilanterol 100-25 mcg/dose inhaler Commonly known as:  BREO ELLIPTA INHALE ONE PUFF BY MOUTH DAILY  
  
 gabapentin 600 mg tablet Commonly known as:  NEURONTIN Take  by mouth two (2) times a day. glucose blood VI test strips strip Commonly known as:  blood glucose test  
True resultTest blood glucose daily  
  
 hydroCHLOROthiazide 25 mg tablet Commonly known as:  HYDRODIURIL  
TAKE 1 TABLET BY MOUTH DAILY INCRUSE ELLIPTA 62.5 mcg/actuation inhaler Generic drug:  umeclidinium INL 1 PUFF PO QD * Lancets Misc True results lancets Test glucose daily * TRUEPLUS LANCETS 30 gauge Misc Generic drug:  lancets TEST GLUCOSE ONCE  DAILY LEXAPRO 20 mg tablet Generic drug:  escitalopram oxalate Take 20 mg by mouth daily. lisinopril 5 mg tablet Commonly known as:  Therisa Semen Take 1 Tab by mouth daily. montelukast 10 mg tablet Commonly known as:  SINGULAIR  
TAKE 1 TABLET BY MOUTH DAILY  
  
 NUCYNTA 100 mg tablet Generic drug:  tapentadol Take 100 mg by mouth every six (6) hours as needed. SITagliptin-metFORMIN  mg per tablet Commonly known as:  David Lime Take 1 Tab by mouth two (2) times daily (with meals). varenicline 0.5 mg (11)- 1 mg (42) Dspk Commonly known as:  CHANTIX STARTER SUZANNA Use as directed * Notice: This list has 4 medication(s) that are the same as other medications prescribed for you. Read the directions carefully, and ask your doctor or other care provider to review them with you. Prescriptions Sent to Pharmacy Refills  
 varenicline (CHANTIX STARTER SUZANNA) 0.5 mg (11)- 1 mg (42) DsPk 0 Sig: Use as directed  Class: Normal  
 Pharmacy: [facility] [address] [facility] Alessandra Schuler  #: [phone] We Performed the Following AMB POC URINE, MICROALBUMIN, SEMIQUANTITATIVE [55082 CPT(R)] COLLECTION VENOUS BLOOD,VENIPUNCTURE [id number] CPT(R)] Follow-up Instructions Return in about 3 months (around 8/14/2018) for Combo. Patient Instructions Learning About Meal Planning for Diabetes Why plan your meals? Meal planning can be a key part of managing diabetes. Planning meals and snacks with the right balance of carbohydrate, protein, and fat can help you keep your blood sugar at the target level you set with your doctor. You don't have to eat special foods. You can eat what your family eats, including sweets once in a while. But you do have to pay attention to how often you eat and how much you eat of certain foods. You may want to work with a dietitian or a certified diabetes educator. He or she can give you tips and meal ideas and can answer your questions about meal planning. This health professional can also help you reach a healthy weight if that is one of your goals. What plan is right for you? Your dietitian or diabetes educator may suggest that you start with the plate format or carbohydrate counting. The plate format The plate format is a simple way to help you manage how you eat. You plan meals by learning how much space each food should take on a plate. Using the plate format helps you spread carbohydrate throughout the day. It can make it easier to keep your blood sugar level within your target range. It also helps you see if you're eating healthy portion sizes. To use the plate format, you put non-starchy vegetables on half your plate. Add meat or meat substitutes on one-quarter of the plate.  Put a grain or starchy vegetable (such as brown rice or a potato) on the final quarter of the plate. You can add a small piece of fruit and some low-fat or fat-free milk or yogurt, depending on your carbohydrate goal for each meal. 
Here are some tips for using the plate format: · Make sure that you are not using an oversized plate. A 9-inch plate is best. Many restaurants use larger plates. · Get used to using the plate format at home. Then you can use it when you eat out. · Write down your questions about using the plate format. Talk to your doctor, a dietitian, or a diabetes educator about your concerns. Carbohydrate counting With carbohydrate counting, you plan meals based on the amount of carbohydrate in each food. Carbohydrate raises blood sugar higher and more quickly than any other nutrient. It is found in desserts, breads and cereals, and fruit. It's also found in starchy vegetables such as potatoes and corn, grains such as rice and pasta, and milk and yogurt. Spreading carbohydrate throughout the day helps keep your blood sugar levels within your target range. Your daily amount depends on several things, including your weight, how active you are, which diabetes medicines you take, and what your goals are for your blood sugar levels. A registered dietitian or diabetes educator can help you plan how much carbohydrate to include in each meal and snack. A guideline for your daily amount of carbohydrate is: · 45 to 60 grams at each meal. That's about the same as 3 to 4 carbohydrate servings. · 15 to 20 grams at each snack. That's about the same as 1 carbohydrate serving. The Nutrition Facts label on packaged foods tells you how much carbohydrate is in a serving of the food. First, look at the serving size on the food label. Is that the amount you eat in a serving? All of the nutrition information on a food label is based on that serving size. So if you eat more or less than that, you'll need to adjust the other numbers. Total carbohydrate is the next thing you need to look for on the label. If you count carbohydrate servings, one serving of carbohydrate is 15 grams. For foods that don't come with labels, such as fresh fruits and vegetables, you'll need a guide that lists carbohydrate in these foods. Ask your doctor, dietitian, or diabetes educator about books or other nutrition guides you can use. If you take insulin, you need to know how many grams of carbohydrate are in a meal. This lets you know how much rapid-acting insulin to take before you eat. If you use an insulin pump, you get a constant rate of insulin during the day. So the pump must be programmed at meals to give you extra insulin to cover the rise in blood sugar after meals. When you know how much carbohydrate you will eat, you can take the right amount of insulin. Or, if you always use the same amount of insulin, you need to make sure that you eat the same amount of carbohydrate at meals. If you need more help to understand carbohydrate counting and food labels, ask your doctor, dietitian, or diabetes educator. How do you get started with meal planning? Here are some tips to get started: 
· Plan your meals a week at a time. Don't forget to include snacks too. · Use cookbooks or online recipes to plan several main meals. Plan some quick meals for busy nights. You also can double some recipes that freeze well. Then you can save half for other busy nights when you don't have time to cook. · Make sure you have the ingredients you need for your recipes. If you're running low on basic items, put these items on your shopping list too. · List foods that you use to make breakfasts, lunches, and snacks. List plenty of fruits and vegetables. · Post this list on the refrigerator. Add to it as you think of more things you need. · Take the list to the store to do your weekly shopping. Follow-up care is a key part of your treatment and safety.  Be sure to make and go to all appointments, and call your doctor if you are having problems. It's also a good idea to know your test results and keep a list of the medicines you take. Where can you learn more? Go to http://marisela-darryn.info/. June Duran in the search box to learn more about \"Learning About Meal Planning for Diabetes. \" Current as of: March 13, 2017 Content Version: 11.4 © 2067-2075 [employer]. Care instructions adapted under license by [employer] (which disclaims liability or warranty for this information). If you have questions about a medical condition or this instruction, always ask your healthcare professional. [address] any warranty or liability for your use of this information. Introducing [facility]! Danna Sood introduces [employer] patient portal. Now you can access parts of your medical record, email your doctor's office, and request medication refills online. 1. In your internet browser, go to [email or web address] 2. Click on the First Time User? Click Here link in the Sign In box. You will see the New Member Sign Up page. 3. Enter your [employer] Access Code exactly as it appears below. You will not need to use this code after youve completed the sign-up process. If you do not sign up before the expiration date, you must request a new code. · [employer] Access Code: [email or web address] Expires: 5/22/2018  4:37 PM 
 
4. Enter the last four digits of your Social Security Number (xxxx) and Date of Birth (mm/dd/yyyy) as indicated and click Submit. You will be taken to the next sign-up page. 5. Create a Planandoo ID. This will be your [employer] login ID and cannot be changed, so think of one that is secure and easy to remember. 6. Create a [employer] password. You can change your password at any time. 7. Enter your Password Reset Question and Answer.  This can be used at a later time if you forget your password. 8. Enter your e-mail address. You will receive e-mail notification when new information is available in [address] 9. Click Sign Up. You can now view and download portions of your medical record. 10. Click the Download Summary menu link to download a portable copy of your medical information. If you have questions, please visit the Frequently Asked Questions section of the [employer] website. Remember, [employer] is NOT to be used for urgent needs. For medical emergencies, dial 911. Now available from your iPhone and Android! Please provide this summary of care documentation to your next provider. Your primary care clinician is listed as Deacon Sands. If you have any questions after today's visit, please call [phone].

## 2018-05-14 NOTE — PROGRESS NOTES
Shan Grider is a 46 y.o. female and presents for annual Medicare Wellness Visit. Problem List: Reviewed with patient and discussed risk factors. 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    Recurrent UTI N39.0    Type 2 diabetes mellitus with diabetic neuropathy (HCC) E11.40       Current medical providers:  Patient Care Team:  Monica lBanchard PA-C as PCP - General (Family Practice)  Elder Jt, RN as Nurse Navigator    PSH: Reviewed with patient  Past Surgical History:   Procedure Laterality Date    HX CHOLECYSTECTOMY      HX ORTHOPAEDIC  2008    [id number]   Sunil Torres ORTHOPAEDIC  3/2015    right foot decompression peroneal nerve        SH: Reviewed with patient  Social History   Substance Use Topics    Smoking status: Current Every Day Smoker     Packs/day: 1.00     Years: 35.00     Last attempt to quit: 4/1/2013    Smokeless tobacco: Never Used    Alcohol use No       FH: Reviewed with patient  Family History   Problem Relation Age of Onset    Cancer Mother     Lung Disease Mother     No Known Problems Father        Medications/Allergies: Reviewed with patient  Current Outpatient Prescriptions on File Prior to Visit   Medication Sig Dispense Refill    estradiol (ESTRACE) 0.01 % (0.1 mg/gram) vaginal cream APPLY 1 FINGER TIP OF MEDICATION TO VULVA EVERY DAY.   1    PROAIR RESPICLICK 90 mcg/actuation aepb INL 2 PFS PO Q 6 H PRF WHZ  5    TRUEPLUS LANCETS 30 gauge misc TEST GLUCOSE ONCE  DAILY  11    INCRUSE ELLIPTA 62.5 mcg/actuation inhaler INL 1 PUFF PO QD  4    albuterol (VENTOLIN HFA) 90 mcg/actuation inhaler Take 2 Puffs by inhalation every six (6) hours as needed for Wheezing. 1 Inhaler 5    montelukast (SINGULAIR) 10 mg tablet TAKE 1 TABLET BY MOUTH DAILY 90 Tab 3    hydroCHLOROthiazide (HYDRODIURIL) 25 mg tablet TAKE 1 TABLET BY MOUTH DAILY 90 Tab 1    lisinopril (PRINIVIL, ZESTRIL) 5 mg tablet Take 1 Tab by mouth daily. 90 Tab 1    SITagliptin-metFORMIN (JANUMET)  mg per tablet Take 1 Tab by mouth two (2) times daily (with meals). 180 Tab 1    fluticasone-vilanterol (BREO ELLIPTA) 100-25 mcg/dose inhaler INHALE ONE PUFF BY MOUTH DAILY 1 Inhaler 5    glucose blood VI test strips (BLOOD GLUCOSE TEST) strip True resultTest blood glucose daily 100 Strip 1    Lancets misc True results lancets Test glucose daily 100 Each 11    ABILIFY 30 mg tablet       escitalopram (LEXAPRO) 20 mg tablet Take 20 mg by mouth daily.  gabapentin (NEURONTIN) 600 mg tablet Take  by mouth two (2) times a day.  diazepam (VALIUM) 10 mg tablet Take 10 mg by mouth every six (6) hours as needed.  tapentadol (NUCYNTA) 100 mg tablet Take 100 mg by mouth every six (6) hours as needed. No current facility-administered medications on file prior to visit. Allergies   Allergen Reactions    Aspirin Hives    Bactrim [Sulfamethoprim Ds] Swelling       Objective:  Visit Vitals    /82 (BP 1 Location: Left arm, BP Patient Position: Sitting)    Pulse 67    Temp 98.1 °F (36.7 °C) (Oral)    Resp 18    Ht 5' 7\" (1.702 m)    Wt 185 lb (83.9 kg)    SpO2 99%    BMI 28.98 kg/m2    Body mass index is 28.98 kg/(m^2). Assessment of cognitive impairment: Alert and oriented x 3    Depression Screen:   PHQ over the last two weeks 9/10/2015   PHQ Not Done Active Diagnosis of Depression or Bipolar Disorder       Fall Risk Assessment:    Fall Risk Assessment, last 12 mths 9/10/2015   Able to walk?  Yes   Fall in past 12 months? No       Functional Ability:   Does the patient exhibit a steady gait? yes   How long did it take the patient to get up and walk from a sitting position? <10 seconds   Is the patient self reliant?  (ie can do own laundry, meals, household chores)  yes     Does the patient handle his/her own medications? yes     Does the patient handle his/her own money? yes     Is the patients home safe (ie good lighting, handrails on stairs and bath, etc.)? yes     Did you notice or did patient express any hearing difficulties? no     Did you notice or did patient express any vision difficulties? No - requires glassses   Were distance and reading eye charts used? yes       Advance Care Planning:   Patient was offered the opportunity to discuss advance care planning:  yes     Does patient have an Advance Directive:  no   If no, did you provide information on Caring Connections? yes       Plan:      Orders Placed This Encounter    METABOLIC PANEL, COMPREHENSIVE    HEMOGLOBIN A1C WITH EAG    AMB POC URINE, MICROALBUMIN, SEMIQUANTITATIVE    COLLECTION VENOUS BLOOD,VENIPUNCTURE    varenicline (CHANTIX STARTER SUZANNA) 0.5 mg (11)- 1 mg (42) DsPk       Health Maintenance   Topic Date Due    EYE EXAM RETINAL OR DILATED Q1  04/10/2018    Influenza Age 5 to Adult  08/01/2018    HEMOGLOBIN A1C Q6M  08/13/2018    BREAST CANCER SCRN MAMMOGRAM  09/26/2018    FOOT EXAM Q1  02/13/2019    LIPID PANEL Q1  02/13/2019    MICROALBUMIN Q1  05/14/2019    MEDICARE YEARLY EXAM  05/15/2019    PAP AKA CERVICAL CYTOLOGY  05/19/2019    COLONOSCOPY  05/14/2022    DTaP/Tdap/Td series (2 - Td) 07/14/2026    Pneumococcal 19-64 Medium Risk  Completed       *Patient verbalized understanding and agreement with the plan. A copy of the After Visit Summary with personalized health plan was given to the patient today.

## 2018-05-14 NOTE — PROGRESS NOTES
Chief Complaint   Patient presents with    Diabetes    Hypertension    Annual Wellness Visit     1. Have you been to the ER, urgent care clinic since your last visit? Hospitalized since your last visit? No    2. Have you seen or consulted any other health care providers outside of the [address] [place] [address] Luis Alberto since your last visit? Include any pap smears or colon screening.  No     ADL Assessment 5/14/2018   Feeding yourself No Help Needed   Getting from bed to chair No Help Needed   Getting dressed No Help Needed   Bathing or showering No Help Needed   Walk across the room (includes cane/walker) No Help Needed   Using the telphone No Help Needed   Taking your medications No Help Needed   Preparing meals No Help Needed   Managing money (expenses/bills) No Help Needed   Moderately strenuous housework (laundry) No Help Needed   Shopping for personal items (toiletries/medicines) No Help Needed   Shopping for groceries No Help Needed   Driving No Help Needed   Climbing a flight of stairs No Help Needed   Getting to places beyond walking distances No Help Needed

## 2018-05-14 NOTE — ACP (ADVANCE CARE PLANNING)
Reviewed importance of ACP with pt during [address] Form provided and advised her to complete it and return to us to scan in.

## 2018-05-14 NOTE — PROGRESS NOTES
HISTORY OF PRESENT ILLNESS  Priya Alcala is a 46 y.o. female. HPI Ms. Tanja Helton is here for follow up on diabetes. She said she's be having surgery on her back in August. She was advised by the surgeon to stop smoking prior to her surgery. She is requesting to re-try chantix again. Review of Systems   Constitutional: Negative. HENT: Negative. Eyes: Negative. Cardiovascular: Negative for chest pain and leg swelling. Musculoskeletal: Positive for back pain. Physical Exam   Constitutional: She is oriented to person, place, and time. She appears well-developed and well-nourished. No distress. HENT:   Head: Normocephalic and atraumatic. Eyes: Conjunctivae are normal.   Cardiovascular: Normal rate and regular rhythm. Pulmonary/Chest: Effort normal and breath sounds normal. She has no wheezes. Neurological: She is alert and oriented to person, place, and time. Visit Vitals    /82 (BP 1 Location: Left arm, BP Patient Position: Sitting)    Pulse 67    Temp 98.1 °F (36.7 °C) (Oral)    Resp 18    Ht 5' 7\" (1.702 m)    Wt 185 lb (83.9 kg)    SpO2 99%    BMI 28.98 kg/m2     Wt Readings from Last 3 Encounters:   05/14/18 185 lb (83.9 kg)   05/10/18 186 lb (84.4 kg)   04/26/18 187 lb 5 oz (85 kg)       Results for orders placed or performed in visit on 05/14/18   AMB POC URINE, MICROALBUMIN, SEMIQUANTITATIVE   Result Value Ref Range    Microalbumin urine (POC) 10 MG/L    Microalbumin/creat ratio (POC) <30 <30 MG/G    Creatinine(Crt), urine 50 MG/DL       ASSESSMENT and PLAN    ICD-10-CM ICD-9-CM    1. Type 2 diabetes mellitus with diabetic neuropathy, without long-term current use of insulin ([facility]) E11.40 250.60 AMB POC URINE, MICROALBUMIN, SEMIQUANTITATIVE     075.3 METABOLIC PANEL, COMPREHENSIVE      HEMOGLOBIN A1C WITH EAG      COLLECTION VENOUS BLOOD,VENIPUNCTURE   2. Current smoker F17.200 305.1 varenicline (CHANTIX STARTER SUZANNA) 0.5 mg (11)- 1 mg (42) DsPk   3.  Bipolar affective disorder, current episode mixed, current episode severity unspecified ([facility] F31.60 296.60 Under care of psychiatry     Pt verbalized understanding of their condition and diagnoses, treatment plan,  as well as side effects of any new medications prescribed.

## 2018-05-14 NOTE — PATIENT INSTRUCTIONS
Learning About Meal Planning for Diabetes  Why plan your meals? Meal planning can be a key part of managing diabetes. Planning meals and snacks with the right balance of carbohydrate, protein, and fat can help you keep your blood sugar at the target level you set with your doctor. You don't have to eat special foods. You can eat what your family eats, including sweets once in a while. But you do have to pay attention to how often you eat and how much you eat of certain foods. You may want to work with a dietitian or a certified diabetes educator. He or she can give you tips and meal ideas and can answer your questions about meal planning. This health professional can also help you reach a healthy weight if that is one of your goals. What plan is right for you? Your dietitian or diabetes educator may suggest that you start with the plate format or carbohydrate counting. The plate format  The plate format is a simple way to help you manage how you eat. You plan meals by learning how much space each food should take on a plate. Using the plate format helps you spread carbohydrate throughout the day. It can make it easier to keep your blood sugar level within your target range. It also helps you see if you're eating healthy portion sizes. To use the plate format, you put non-starchy vegetables on half your plate. Add meat or meat substitutes on one-quarter of the plate. Put a grain or starchy vegetable (such as brown rice or a potato) on the final quarter of the plate. You can add a small piece of fruit and some low-fat or fat-free milk or yogurt, depending on your carbohydrate goal for each meal.  Here are some tips for using the plate format:  · Make sure that you are not using an oversized plate. A 9-inch plate is best. Many restaurants use larger plates. · Get used to using the plate format at home. Then you can use it when you eat out. · Write down your questions about using the plate format.  Talk to your doctor, a dietitian, or a diabetes educator about your concerns. Carbohydrate counting  With carbohydrate counting, you plan meals based on the amount of carbohydrate in each food. Carbohydrate raises blood sugar higher and more quickly than any other nutrient. It is found in desserts, breads and cereals, and fruit. It's also found in starchy vegetables such as potatoes and corn, grains such as rice and pasta, and milk and yogurt. Spreading carbohydrate throughout the day helps keep your blood sugar levels within your target range. Your daily amount depends on several things, including your weight, how active you are, which diabetes medicines you take, and what your goals are for your blood sugar levels. A registered dietitian or diabetes educator can help you plan how much carbohydrate to include in each meal and snack. A guideline for your daily amount of carbohydrate is:  · 45 to 60 grams at each meal. That's about the same as 3 to 4 carbohydrate servings. · 15 to 20 grams at each snack. That's about the same as 1 carbohydrate serving. The Nutrition Facts label on packaged foods tells you how much carbohydrate is in a serving of the food. First, look at the serving size on the food label. Is that the amount you eat in a serving? All of the nutrition information on a food label is based on that serving size. So if you eat more or less than that, you'll need to adjust the other numbers. Total carbohydrate is the next thing you need to look for on the label. If you count carbohydrate servings, one serving of carbohydrate is 15 grams. For foods that don't come with labels, such as fresh fruits and vegetables, you'll need a guide that lists carbohydrate in these foods. Ask your doctor, dietitian, or diabetes educator about books or other nutrition guides you can use.   If you take insulin, you need to know how many grams of carbohydrate are in a meal. This lets you know how much rapid-acting insulin to take before you eat. If you use an insulin pump, you get a constant rate of insulin during the day. So the pump must be programmed at meals to give you extra insulin to cover the rise in blood sugar after meals. When you know how much carbohydrate you will eat, you can take the right amount of insulin. Or, if you always use the same amount of insulin, you need to make sure that you eat the same amount of carbohydrate at meals. If you need more help to understand carbohydrate counting and food labels, ask your doctor, dietitian, or diabetes educator. How do you get started with meal planning? Here are some tips to get started:  · Plan your meals a week at a time. Don't forget to include snacks too. · Use cookbooks or online recipes to plan several main meals. Plan some quick meals for busy nights. You also can double some recipes that freeze well. Then you can save half for other busy nights when you don't have time to cook. · Make sure you have the ingredients you need for your recipes. If you're running low on basic items, put these items on your shopping list too. · List foods that you use to make breakfasts, lunches, and snacks. List plenty of fruits and vegetables. · Post this list on the refrigerator. Add to it as you think of more things you need. · Take the list to the store to do your weekly shopping. Follow-up care is a key part of your treatment and safety. Be sure to make and go to all appointments, and call your doctor if you are having problems. It's also a good idea to know your test results and keep a list of the medicines you take. Where can you learn more? Go to http://marisela-darryn.info/. Jorje Rodriguez in the search box to learn more about \"Learning About Meal Planning for Diabetes. \"  Current as of: March 13, 2017  Content Version: 11.4  © 1772-8969 [employer].  Care instructions adapted under license by [employer] (which disclaims liability or warranty for this information). If you have questions about a medical condition or this instruction, always ask your healthcare professional. [place] [address] any warranty or liability for your use of this information. Schedule of Personalized Health Plan  (Provide Copy to Patient)  The best way to stay healthy is to live a healthy lifestyle. A healthy lifestyle includes regular exercise, eating a well-balanced diet, keeping a healthy weight and not smoking. Regular physical exams and screening tests are another important way to take care of yourself. Preventive exams provided by health care providers can find health problems early when treatment works best and can keep you from getting certain diseases or illnesses. Preventive services include exams, lab tests, screenings, shots, monitoring and information to help you take care of your own health. All people over 65 should have a pneumonia shot. Pneumonia shots are usually only needed once in a lifetime unless your doctor decides differently. All people over 65 should have a yearly flu shot. People over 65 are at medium to high risk for Hepatitis B. Three shots are needed for complete protection. In addition to your physical exam, some screening tests are recommended:    Bone mass measurement (dexa scan) is recommended every two years  Diabetes Mellitus screening is recommended every year. Glaucoma is an eye disease caused by high pressure in the eye. An eye exam is recommended every year. Cardiovascular screening tests that check your cholesterol and other blood fat (lipid) levels are recommended every five years. Colorectal Cancer screening tests help to find pre-cancerous polyps (growths in the colon) so they can be removed before they turn into cancer. Tests ordered for screening depend on your personal and family history risk factors.     Screening for Breast Cancer is recommended yearly with a mammogram.    Screening for Cervical Cancer is recommended every two years (annually for certain risk factors, such as previous history of STD or abnormal PAP in past 7 years), with a Pelvic Exam with PAP    Here is a list of your current Health Maintenance items with a due date:  Health Maintenance   Topic Date Due    EYE EXAM RETINAL OR DILATED Q1  04/10/2018    Influenza Age 5 to Adult  08/01/2018    HEMOGLOBIN A1C Q6M  08/13/2018    BREAST CANCER SCRN MAMMOGRAM  09/26/2018    FOOT EXAM Q1  02/13/2019    LIPID PANEL Q1  02/13/2019    MICROALBUMIN Q1  05/14/2019    MEDICARE YEARLY EXAM  05/15/2019    PAP AKA CERVICAL CYTOLOGY  05/19/2019    COLONOSCOPY  05/14/2022    DTaP/Tdap/Td series (2 - Td) 07/14/2026    Pneumococcal 19-64 Medium Risk  Completed

## 2018-05-16 LAB
A-G RATIO,AGRAT: 1.3 RATIO (ref 1.1–2.6)
ALBUMIN SERPL-MCNC: 4.3 G/DL (ref 3.5–5)
ALP SERPL-CCNC: 80 U/L (ref 25–115)
ALT SERPL-CCNC: 7 U/L (ref 5–40)
ANION GAP SERPL CALC-SCNC: 13 MMOL/L
AST SERPL W P-5'-P-CCNC: 14 U/L (ref 10–37)
AVG GLU, 10930: 130 MG/DL (ref 91–123)
BILIRUB SERPL-MCNC: 0.2 MG/DL (ref 0.2–1.2)
BUN SERPL-MCNC: 12 MG/DL (ref 6–22)
CALCIUM SERPL-MCNC: 9.2 MG/DL (ref 8.4–10.5)
CHLORIDE SERPL-SCNC: 99 MMOL/L (ref 98–110)
CO2 SERPL-SCNC: 29 MMOL/L (ref 20–32)
CREAT SERPL-MCNC: 0.8 MG/DL (ref 0.5–1.2)
GFRAA, 66117: >60
GFRNA, 66118: >60
GLOBULIN,GLOB: 3.2 G/DL (ref 2–4)
GLUCOSE SERPL-MCNC: 98 MG/DL (ref 70–99)
HBA1C MFR BLD HPLC: 6.2 % (ref 4.8–5.9)
POTASSIUM SERPL-SCNC: 4.3 MMOL/L (ref 3.5–5.5)
PROT SERPL-MCNC: 7.5 G/DL (ref 6.4–8.3)
SODIUM SERPL-SCNC: 141 MMOL/L (ref 133–145)

## 2018-05-24 DIAGNOSIS — B37.9 ANTIBIOTIC-INDUCED YEAST INFECTION: ICD-10-CM

## 2018-05-24 DIAGNOSIS — T36.95XA ANTIBIOTIC-INDUCED YEAST INFECTION: ICD-10-CM

## 2018-05-25 RX ORDER — FLUCONAZOLE 150 MG/1
150 TABLET ORAL DAILY
Qty: 1 TAB | Refills: 0 | Status: SHIPPED | OUTPATIENT
Start: 2018-05-25 | End: 2018-05-26

## 2018-07-30 ENCOUNTER — HOSPITAL ENCOUNTER (OUTPATIENT)
Dept: GENERAL RADIOLOGY | Age: 53
Discharge: HOME | End: 2018-07-30
Payer: MEDICARE

## 2018-07-30 ENCOUNTER — HOSPITAL ENCOUNTER (OUTPATIENT)
Dept: LAB | Age: 53
Discharge: HOME | End: 2018-07-30
Payer: MEDICARE

## 2018-07-30 ENCOUNTER — HOSPITAL ENCOUNTER (OUTPATIENT)
Dept: PREADMISSION TESTING | Age: 53
Discharge: HOME | End: 2018-07-30
Payer: MEDICARE

## 2018-07-30 DIAGNOSIS — S32.009A CLOSED FRACTURE OF LUMBAR VERTEBRA WITHOUT SPINAL CORD INJURY (HCC): ICD-10-CM

## 2018-07-30 DIAGNOSIS — S32.009K PSEUDOARTHROSIS OF LUMBAR SPINE: ICD-10-CM

## 2018-07-30 LAB
ALBUMIN SERPL-MCNC: 3.8 G/DL (ref 3.4–5)
ALBUMIN/GLOB SERPL: 0.9 {RATIO} (ref 0.8–1.7)
ALP SERPL-CCNC: 91 U/L (ref 45–117)
ALT SERPL-CCNC: 13 U/L (ref 13–56)
ANION GAP SERPL CALC-SCNC: 6 MMOL/L (ref 3–18)
AST SERPL-CCNC: 10 U/L (ref 15–37)
ATRIAL RATE: 64 BPM
BASOPHILS # BLD: NORMAL K/UL
BASOPHILS NFR BLD: NORMAL %
BILIRUB SERPL-MCNC: 0.3 MG/DL (ref 0.2–1)
BUN SERPL-MCNC: 13 MG/DL (ref 7–18)
BUN/CREAT SERPL: 16 (ref 12–20)
CALCIUM SERPL-MCNC: 9.2 MG/DL (ref 8.5–10.1)
CALCULATED P AXIS, ECG09: 59 DEGREES
CALCULATED R AXIS, ECG10: 42 DEGREES
CALCULATED T AXIS, ECG11: 34 DEGREES
CHLORIDE SERPL-SCNC: 104 MMOL/L (ref 100–108)
CO2 SERPL-SCNC: 32 MMOL/L (ref 21–32)
CREAT SERPL-MCNC: 0.81 MG/DL (ref 0.6–1.3)
DIAGNOSIS, 93000: NORMAL
DIFFERENTIAL METHOD BLD: NORMAL
EOSINOPHIL # BLD: NORMAL K/UL
EOSINOPHIL NFR BLD: NORMAL %
ERYTHROCYTE [DISTWIDTH] IN BLOOD BY AUTOMATED COUNT: 14.2 % (ref 11.6–14.5)
GLOBULIN SER CALC-MCNC: 4.1 G/DL (ref 2–4)
GLUCOSE SERPL-MCNC: 78 MG/DL (ref 74–99)
HCT VFR BLD AUTO: 40.5 % (ref 35–45)
HGB BLD-MCNC: 12.9 G/DL (ref 12–16)
LYMPHOCYTES # BLD: NORMAL K/UL
LYMPHOCYTES NFR BLD: NORMAL %
MCH RBC QN AUTO: 27 PG (ref 24–34)
MCHC RBC AUTO-ENTMCNC: 31.9 G/DL (ref 31–37)
MCV RBC AUTO: 84.9 FL (ref 74–97)
MONOCYTES # BLD: NORMAL K/UL
MONOCYTES NFR BLD: NORMAL %
NEUTS SEG # BLD: NORMAL K/UL
NEUTS SEG NFR BLD: NORMAL %
P-R INTERVAL, ECG05: 166 MS
PLATELET # BLD AUTO: 161 K/UL (ref 135–420)
PMV BLD AUTO: 11.1 FL (ref 9.2–11.8)
POTASSIUM SERPL-SCNC: 4.1 MMOL/L (ref 3.5–5.5)
PROT SERPL-MCNC: 7.9 G/DL (ref 6.4–8.2)
Q-T INTERVAL, ECG07: 436 MS
QRS DURATION, ECG06: 78 MS
QTC CALCULATION (BEZET), ECG08: 449 MS
RBC # BLD AUTO: 4.77 M/UL (ref 4.2–5.3)
SODIUM SERPL-SCNC: 142 MMOL/L (ref 136–145)
VENTRICULAR RATE, ECG03: 64 BPM
WBC # BLD AUTO: 8.5 K/UL (ref 4.6–13.2)

## 2018-07-30 PROCEDURE — 85025 COMPLETE CBC W/AUTO DIFF WBC: CPT | Performed by: ORTHOPAEDIC SURGERY

## 2018-07-30 PROCEDURE — 80048 BASIC METABOLIC PNL TOTAL CA: CPT

## 2018-07-30 PROCEDURE — 71046 X-RAY EXAM CHEST 2 VIEWS: CPT

## 2018-07-30 PROCEDURE — 80053 COMPREHEN METABOLIC PANEL: CPT | Performed by: ORTHOPAEDIC SURGERY

## 2018-07-30 PROCEDURE — 93005 ELECTROCARDIOGRAM TRACING: CPT

## 2018-07-30 PROCEDURE — 36415 COLL VENOUS BLD VENIPUNCTURE: CPT | Performed by: ORTHOPAEDIC SURGERY

## 2018-07-30 RX ORDER — OMEPRAZOLE 20 MG/1
20 CAPSULE, DELAYED RELEASE ORAL DAILY
COMMUNITY

## 2018-07-30 NOTE — PERIOP NOTES
PAT - SURGICAL PRE-ADMISSION INSTRUCTIONS 
 
NAME:  Holger Mckinney                                                          TODAY'S DATE:  7/30/2018 SURGERY DATE:  8/29/2018                                  SURGERY ARRIVAL TIME:   4494 1. Do NOT eat or drink anything, including candy or gum, after MIDNIGHT on 8/28/18 , unless you have specific instructions from your Surgeon or Anesthesia Provider to do so. 2. No smoking on the day of surgery. 3. No alcohol 24 hours prior to the day of surgery. 4. No recreational drugs for one week prior to the day of surgery. 5. Leave all valuables, including money/purse, at home. 6. Remove all jewelry, nail polish, makeup (including mascara); no lotions, powders, deodorant, or perfume/cologne/after shave. 7. Glasses/Contact lenses and Dentures may be worn to the hospital.  They will be removed prior to surgery. 8. Call your doctor if symptoms of a cold or illness develop within 24 ours prior to surgery. 9. AN ADULT MUST DRIVE YOU HOME AFTER OUTPATIENT SURGERY. 10. If you are having an OUTPATIENT procedure, please make arrangements for a responsible adult to be with you for 24 hours after your surgery. 11. If you are admitted to the hospital, you will be assigned to a bed after surgery is complete. Normally a family member will not be able to see you until you are in your assigned bed. 15. Family is encouraged to accompany you to the hospital.  We ask visitors in the treatment area to be limited to ONE person at a time to ensure patient privacy. EXCEPTIONS WILL BE MADE AS NEEDED. 15. Children under 12 are discouraged from entering the treatment area and need to be supervised by an adult when in the waiting room. Special Instructions: 
 
Bring inhaler. , Take these medications the morning of surgery with a sip of water:  INHALERS & VALIUM IF NEEDED, HOLD oral diabetic medication on the MORNING OF surgery. , HOLD metformin/glucophage dose starting the [facility] BEFORE the day of surgery. Patient Prep: 
 
use CHG solution These surgical instructions were reviewed with PATIENT during the PAT VISIT. A printed copy of the instructions was provided to PATIENT. Directions: On the morning of surgery, please go to the [facility]. Enter the building from the [facility] entrance, 1st floor (next to the Emergency Room entrance). Take the elevator to the 2nd floor. Sign in at the Registration Desk. If you have any questions and/or concerns, please do not hesitate to call: 
(Prior to the day of surgery)  \[facility]\"" unit:  [phone] (Day of surgery)  [facility] unit:  [phone]

## 2018-08-06 ENCOUNTER — OFFICE VISIT (OUTPATIENT)
Dept: INTERNAL MEDICINE CLINIC | Age: 53
End: 2018-08-06

## 2018-08-06 VITALS
DIASTOLIC BLOOD PRESSURE: 85 MMHG | BODY MASS INDEX: 28.95 KG/M2 | OXYGEN SATURATION: 100 % | RESPIRATION RATE: 18 BRPM | HEIGHT: 68 IN | HEART RATE: 65 BPM | WEIGHT: 191 LBS | SYSTOLIC BLOOD PRESSURE: 120 MMHG | TEMPERATURE: 98.2 F

## 2018-08-06 DIAGNOSIS — S32.009K PSEUDOARTHROSIS OF LUMBAR SPINE: Primary | ICD-10-CM

## 2018-08-06 DIAGNOSIS — Z01.818 PRE-OPERATIVE CLEARANCE: ICD-10-CM

## 2018-08-06 DIAGNOSIS — R94.31 ABNORMAL EKG: ICD-10-CM

## 2018-08-06 NOTE — PROGRESS NOTES
Chief Complaint   Patient presents with    Pre-op Exam     1. Have you been to the ER, urgent care clinic since your last visit? Hospitalized since your last visit? No    2. Have you seen or consulted any other health care providers outside of the [address] [place] [address] since your last visit? Include any pap smears or colon screening.  Yes Where: orthopedic

## 2018-08-06 NOTE — PROGRESS NOTES
HISTORY OF PRESENT ILLNESS  Daria Burkitt is a 48 y.o. female. HPI Ms. Mayela Beach is here for pre-op clearance for lumbar surgery with decompression. She has long h/o back pain with prior surgeries. She has been seeing pain management for several years and has not responded to conservative treatment and would like to proceed with surgery. Active Ambulatory Problems     Diagnosis Date Noted    Essential hypertension, benign 10/25/2012    Overactive bladder 10/25/2012    Bipolar disorder ([facility] 10/25/2012    Chronic back pain 07/05/2013    Glaucoma 11/01/2013    UTI (lower urinary tract infection) 01/08/2014    Diabetes mellitus ([facility] 04/13/2015    Psychotic disorder     Hepatitis C     GERD (gastroesophageal reflux disease)     Depression     Chronic pain     Asthma     Plantar verruca 09/18/2011    Leg peripheral nerve injury 03/25/2015    Recurrent UTI 03/27/2017    Type 2 diabetes mellitus with diabetic neuropathy ([facility] 02/13/2018       Past Medical History:   Diagnosis Date    Asthma     Avascular necrosis of bone of right hip (HCC)     Chronic pain     Depression     Diabetes (HCC)     GERD (gastroesophageal reflux disease)     Hepatitis C     Hypertension     Nicotine vapor product user     Psychotic disorder      Past Surgical History:   Procedure Laterality Date    HX CHOLECYSTECTOMY      HX HEENT      EYE SX    HX ORTHOPAEDIC  2008    [id number]-s1    HX ORTHOPAEDIC  3/2015    right foot decompression peroneal nerve     Family History   Problem Relation Age of Onset    Cancer Mother     Lung Disease Mother     No Known Problems Father      Social History     Social History    Marital status: LEGALLY      Spouse name: N/A    Number of children: N/A    Years of education: N/A     Occupational History    Not on file.      Social History Main Topics    Smoking status: Former Smoker     Packs/day: 1.00     Years: 35.00     Quit date: 7/1/2018     Current Outpatient Prescriptions   Medication Sig    omeprazole (PRILOSEC) 20 mg capsule Take 20 mg by mouth daily.  potassium chloride (KLOR-CON) 10 mEq tablet TK 1 T PO D    PROAIR RESPICLICK 90 mcg/actuation aepb INL 2 PFS PO Q 6 H PRF WHZ    TRUEPLUS LANCETS 30 gauge misc TEST GLUCOSE ONCE  DAILY    INCRUSE ELLIPTA 62.5 mcg/actuation inhaler INL 1 PUFF PO QD    albuterol (VENTOLIN HFA) 90 mcg/actuation inhaler Take 2 Puffs by inhalation every six (6) hours as needed for Wheezing.  montelukast (SINGULAIR) 10 mg tablet TAKE 1 TABLET BY MOUTH DAILY    hydroCHLOROthiazide (HYDRODIURIL) 25 mg tablet TAKE 1 TABLET BY MOUTH DAILY    lisinopril (PRINIVIL, ZESTRIL) 5 mg tablet Take 1 Tab by mouth daily.  SITagliptin-metFORMIN (JANUMET)  mg per tablet Take 1 Tab by mouth two (2) times daily (with meals).  fluticasone-vilanterol (BREO ELLIPTA) 100-25 mcg/dose inhaler INHALE ONE PUFF BY MOUTH DAILY    glucose blood VI test strips (BLOOD GLUCOSE TEST) strip True resultTest blood glucose daily    Lancets misc True results lancets Test glucose daily    ABILIFY 30 mg tablet daily.  escitalopram (LEXAPRO) 20 mg tablet Take 20 mg by mouth daily.  gabapentin (NEURONTIN) 600 mg tablet Take  by mouth two (2) times a day.  diazepam (VALIUM) 10 mg tablet Take 10 mg by mouth every six (6) hours as needed.  tapentadol (NUCYNTA) 100 mg tablet Take 100 mg by mouth every six (6) hours as needed. No current facility-administered medications for this visit. CXR: normal.   Non-specific T wave abnormality. Labs: WNL    Review of Systems   Constitutional: Negative. HENT: Negative. Eyes: Negative for blurred vision. Respiratory: Negative for cough and shortness of breath. Cardiovascular: Negative. Gastrointestinal: Negative. Genitourinary: Positive for frequency (OAB). Musculoskeletal: Positive for back pain. Skin: Negative.         Physical Exam   Constitutional: She is oriented to person, place, and time. She appears well-developed and well-nourished. No distress. HENT:   Head: Normocephalic and atraumatic. Eyes: Conjunctivae are normal.   Cardiovascular: Normal rate and regular rhythm. Pulmonary/Chest: Effort normal and breath sounds normal. She has no wheezes. Musculoskeletal: She exhibits no edema. Neurological: She is alert and oriented to person, place, and time. Visit Vitals    /85 (BP 1 Location: Left arm, BP Patient Position: Sitting)    Pulse 65    Temp 98.2 °F (36.8 °C) (Oral)    Resp 18    Ht 5' 7.5\" (1.715 m)    Wt 191 lb (86.6 kg)    SpO2 100%    BMI 29.47 kg/m2     Wt Readings from Last 3 Encounters:   08/06/18 191 lb (86.6 kg)   06/07/18 185 lb (83.9 kg)   05/14/18 185 lb (83.9 kg)     ASSESSMENT and PLAN    ICD-10-CM ICD-9-CM    1. Pseudoarthrosis of lumbar spine S32.009K 733.82    2. Pre-operative clearance Z01.818 V72.84 Cleared for surgery   3. Abnormal EKG R94.31 794.31 ECHO TTE STRESS EXERCISE TREADMILL COMP - not necessary to have prior to surgery      ECHO TTE STRESS COMP W OR WO CONTR    She has had several past EKGS which have showed non-specific ST changes as well as one with possible Left atrial abnormality. Had stress test in 2010. Pt verbalized understanding of their condition and diagnoses, treatment plan,  as well as side effects of any new medications prescribed.

## 2018-08-08 DIAGNOSIS — R94.31 ABNORMAL EKG: ICD-10-CM

## 2018-08-10 NOTE — PROGRESS NOTES
I agree with Amber's assessment and plan. EKG showing non-specific T wave changes, no old EKG for comparison. Most likely safe for surgery but she has ordered stress echo to make sure which I agree with.   Laurence Connor MD

## 2018-08-28 ENCOUNTER — ANESTHESIA EVENT (OUTPATIENT)
Dept: SURGERY | Age: 53
DRG: 455 | End: 2018-08-28
Payer: MEDICARE

## 2018-08-29 ENCOUNTER — HOSPITAL ENCOUNTER (INPATIENT)
Age: 53
LOS: 3 days | Discharge: HOME HEALTH | DRG: 455 | End: 2018-09-01
Attending: ORTHOPAEDIC SURGERY | Admitting: ORTHOPAEDIC SURGERY
Payer: MEDICARE

## 2018-08-29 ENCOUNTER — ANESTHESIA (OUTPATIENT)
Dept: SURGERY | Age: 53
DRG: 455 | End: 2018-08-29
Payer: MEDICARE

## 2018-08-29 ENCOUNTER — APPOINTMENT (OUTPATIENT)
Dept: GENERAL RADIOLOGY | Age: 53
DRG: 455 | End: 2018-08-29
Attending: ORTHOPAEDIC SURGERY
Payer: MEDICARE

## 2018-08-29 DIAGNOSIS — G89.29 CHRONIC MIDLINE LOW BACK PAIN WITH SCIATICA, SCIATICA LATERALITY UNSPECIFIED: Primary | ICD-10-CM

## 2018-08-29 DIAGNOSIS — M54.40 CHRONIC MIDLINE LOW BACK PAIN WITH SCIATICA, SCIATICA LATERALITY UNSPECIFIED: Primary | ICD-10-CM

## 2018-08-29 PROBLEM — M96.0 PSEUDARTHROSIS FOLLOWING SPINAL FUSION: Status: ACTIVE | Noted: 2018-08-29

## 2018-08-29 PROBLEM — S32.009K PSEUDOARTHROSIS OF LUMBAR SPINE: Status: ACTIVE | Noted: 2018-08-29

## 2018-08-29 LAB
GLUCOSE BLD STRIP.AUTO-MCNC: 121 MG/DL (ref 70–110)
GLUCOSE BLD STRIP.AUTO-MCNC: 150 MG/DL (ref 70–110)
GLUCOSE BLD STRIP.AUTO-MCNC: 202 MG/DL (ref 70–110)
HCG UR QL: NEGATIVE

## 2018-08-29 PROCEDURE — 74011250636 HC RX REV CODE- 250/636: Performed by: ORTHOPAEDIC SURGERY

## 2018-08-29 PROCEDURE — 3E0U0GB INTRODUCTION OF RECOMBINANT BONE MORPHOGENETIC PROTEIN INTO JOINTS, OPEN APPROACH: ICD-10-PCS | Performed by: ORTHOPAEDIC SURGERY

## 2018-08-29 PROCEDURE — 77030031359 HC BLD BN MILL DISP STRY -E: Performed by: ORTHOPAEDIC SURGERY

## 2018-08-29 PROCEDURE — 77030018673: Performed by: ORTHOPAEDIC SURGERY

## 2018-08-29 PROCEDURE — 77030018836 HC SOL IRR NACL ICUM -A: Performed by: ORTHOPAEDIC SURGERY

## 2018-08-29 PROCEDURE — 77030003195 HC GRFT RECOMB BN MEDT -J: Performed by: ORTHOPAEDIC SURGERY

## 2018-08-29 PROCEDURE — 76010000176 HC OR TIME 4.5 TO 5 HR INTENSV-TIER 1: Performed by: ORTHOPAEDIC SURGERY

## 2018-08-29 PROCEDURE — 76210000006 HC OR PH I REC 0.5 TO 1 HR: Performed by: ORTHOPAEDIC SURGERY

## 2018-08-29 PROCEDURE — 77030011265 HC ELECTRD BLD HEX COVD -A: Performed by: ORTHOPAEDIC SURGERY

## 2018-08-29 PROCEDURE — 77030003028 HC SUT VCRL J&J -A: Performed by: ORTHOPAEDIC SURGERY

## 2018-08-29 PROCEDURE — 01NB0ZZ RELEASE LUMBAR NERVE, OPEN APPROACH: ICD-10-PCS | Performed by: ORTHOPAEDIC SURGERY

## 2018-08-29 PROCEDURE — 74011250636 HC RX REV CODE- 250/636: Performed by: NURSE ANESTHETIST, CERTIFIED REGISTERED

## 2018-08-29 PROCEDURE — 77030008477 HC STYL SATN SLP COVD -A: Performed by: ANESTHESIOLOGY

## 2018-08-29 PROCEDURE — 74011250636 HC RX REV CODE- 250/636

## 2018-08-29 PROCEDURE — 77030031139 HC SUT VCRL2 J&J -A: Performed by: ORTHOPAEDIC SURGERY

## 2018-08-29 PROCEDURE — 65270000029 HC RM PRIVATE

## 2018-08-29 PROCEDURE — C1713 ANCHOR/SCREW BN/BN,TIS/BN: HCPCS | Performed by: ORTHOPAEDIC SURGERY

## 2018-08-29 PROCEDURE — 77030018723 HC ELCTRD BLD COVD -A: Performed by: ORTHOPAEDIC SURGERY

## 2018-08-29 PROCEDURE — 74011250637 HC RX REV CODE- 250/637: Performed by: INTERNAL MEDICINE

## 2018-08-29 PROCEDURE — 81025 URINE PREGNANCY TEST: CPT

## 2018-08-29 PROCEDURE — 76060000040 HC ANESTHESIA 4.5 TO 5 HR: Performed by: ORTHOPAEDIC SURGERY

## 2018-08-29 PROCEDURE — 74011000250 HC RX REV CODE- 250: Performed by: ORTHOPAEDIC SURGERY

## 2018-08-29 PROCEDURE — 0SG3071 FUSION OF LUMBOSACRAL JOINT WITH AUTOLOGOUS TISSUE SUBSTITUTE, POSTERIOR APPROACH, POSTERIOR COLUMN, OPEN APPROACH: ICD-10-PCS | Performed by: ORTHOPAEDIC SURGERY

## 2018-08-29 PROCEDURE — 77030011264 HC ELECTRD BLD EXT COVD -A: Performed by: ORTHOPAEDIC SURGERY

## 2018-08-29 PROCEDURE — 77030032490 HC SLV COMPR SCD KNE COVD -B: Performed by: ORTHOPAEDIC SURGERY

## 2018-08-29 PROCEDURE — 77030026133 HC BN CANC CHP CRSH LIFV -F: Performed by: ORTHOPAEDIC SURGERY

## 2018-08-29 PROCEDURE — 77010033678 HC OXYGEN DAILY

## 2018-08-29 PROCEDURE — 0ST40ZZ RESECTION OF LUMBOSACRAL DISC, OPEN APPROACH: ICD-10-PCS | Performed by: ORTHOPAEDIC SURGERY

## 2018-08-29 PROCEDURE — 77030004391 HC BUR FLUT MEDT -C: Performed by: ORTHOPAEDIC SURGERY

## 2018-08-29 PROCEDURE — 74011250637 HC RX REV CODE- 250/637

## 2018-08-29 PROCEDURE — 77030028593 HC SPCR SPN ZYSTN ZIMM -I1: Performed by: ORTHOPAEDIC SURGERY

## 2018-08-29 PROCEDURE — 74011250637 HC RX REV CODE- 250/637: Performed by: NURSE ANESTHETIST, CERTIFIED REGISTERED

## 2018-08-29 PROCEDURE — 86900 BLOOD TYPING SEROLOGIC ABO: CPT | Performed by: NURSE ANESTHETIST, CERTIFIED REGISTERED

## 2018-08-29 PROCEDURE — C1762 CONN TISS, HUMAN(INC FASCIA): HCPCS | Performed by: ORTHOPAEDIC SURGERY

## 2018-08-29 PROCEDURE — 77030027138 HC INCENT SPIROMETER -A

## 2018-08-29 PROCEDURE — 86920 COMPATIBILITY TEST SPIN: CPT | Performed by: NURSE ANESTHETIST, CERTIFIED REGISTERED

## 2018-08-29 PROCEDURE — 74011000272 HC RX REV CODE- 272: Performed by: ORTHOPAEDIC SURGERY

## 2018-08-29 PROCEDURE — 77030034479 HC ADH SKN CLSR PRINEO J&J -B: Performed by: ORTHOPAEDIC SURGERY

## 2018-08-29 PROCEDURE — 77030008683 HC TU ET CUF COVD -A: Performed by: ANESTHESIOLOGY

## 2018-08-29 PROCEDURE — 74011000258 HC RX REV CODE- 258: Performed by: ORTHOPAEDIC SURGERY

## 2018-08-29 PROCEDURE — 77030034850: Performed by: ORTHOPAEDIC SURGERY

## 2018-08-29 PROCEDURE — 74011000250 HC RX REV CODE- 250

## 2018-08-29 PROCEDURE — 0SG30AJ FUSION OF LUMBOSACRAL JOINT WITH INTERBODY FUSION DEVICE, POSTERIOR APPROACH, ANTERIOR COLUMN, OPEN APPROACH: ICD-10-PCS | Performed by: ORTHOPAEDIC SURGERY

## 2018-08-29 PROCEDURE — 74011636637 HC RX REV CODE- 636/637

## 2018-08-29 PROCEDURE — 72100 X-RAY EXAM L-S SPINE 2/3 VWS: CPT

## 2018-08-29 PROCEDURE — 77030016642 HC BUR ACRN4 MEDT -C: Performed by: ORTHOPAEDIC SURGERY

## 2018-08-29 PROCEDURE — 77030037731 HC PTTY BN HEMOSRB ABRY -H: Performed by: ORTHOPAEDIC SURGERY

## 2018-08-29 PROCEDURE — 77030037723 HC GRFT BN SUB BGNX -F: Performed by: ORTHOPAEDIC SURGERY

## 2018-08-29 PROCEDURE — 77030013079 HC BLNKT BAIR HGGR 3M -A: Performed by: ANESTHESIOLOGY

## 2018-08-29 PROCEDURE — 82962 GLUCOSE BLOOD TEST: CPT

## 2018-08-29 PROCEDURE — 77030003666 HC NDL SPINAL BD -A: Performed by: ORTHOPAEDIC SURGERY

## 2018-08-29 DEVICE — SCREW SPNL L45MM OD6.5MM TI ST MULTAXL CANN FULL THRD HELCL: Type: IMPLANTABLE DEVICE | Site: SPINE LUMBAR | Status: FUNCTIONAL

## 2018-08-29 DEVICE — IMPLANTABLE DEVICE: Type: IMPLANTABLE DEVICE | Site: SPINE LUMBAR | Status: FUNCTIONAL

## 2018-08-29 DEVICE — ROD SPNL L510MM DIA5.5MM CO CHROM DBL X HRD HI HEX POLARIS: Type: IMPLANTABLE DEVICE | Site: SPINE LUMBAR | Status: FUNCTIONAL

## 2018-08-29 DEVICE — PLUG SPNL DIA5.5MM STD HELI FLNG POLARIS: Type: IMPLANTABLE DEVICE | Site: SPINE LUMBAR | Status: FUNCTIONAL

## 2018-08-29 DEVICE — GRAFT BONE SUB 5CC CA PHOS VIT E ACETT HEMSTAT COHESIVE: Type: IMPLANTABLE DEVICE | Site: SPINE LUMBAR | Status: FUNCTIONAL

## 2018-08-29 DEVICE — GRAFT BNE 6ML SUB OSTEOSPAN MOLD OPN BRL SYR MORPHEUS: Type: IMPLANTABLE DEVICE | Site: SPINE LUMBAR | Status: FUNCTIONAL

## 2018-08-29 DEVICE — BONE GRAFT KIT 7510400 INFUSE MEDIUM
Type: IMPLANTABLE DEVICE | Site: SPINE LUMBAR | Status: FUNCTIONAL
Brand: INFUSE® BONE GRAFT

## 2018-08-29 DEVICE — SCREW SPNL L80MM OD8.5MM TI CANC IL MULTAXL NONCANNULATED: Type: IMPLANTABLE DEVICE | Site: SPINE LUMBAR | Status: FUNCTIONAL

## 2018-08-29 DEVICE — CONNECTOR SPNL L35MM LAT OFFSET FOR 5.5MM ROD POLARIS: Type: IMPLANTABLE DEVICE | Site: SPINE LUMBAR | Status: FUNCTIONAL

## 2018-08-29 DEVICE — BONE CHIP CANC CRSH 1.7-10MM -- READICRAFT: Type: IMPLANTABLE DEVICE | Site: SPINE LUMBAR | Status: FUNCTIONAL

## 2018-08-29 DEVICE — SCREW SPNL L50MM OD6.5MM TI ST MULTAXL CANN FULL THRD HELCL: Type: IMPLANTABLE DEVICE | Site: SPINE LUMBAR | Status: FUNCTIONAL

## 2018-08-29 RX ORDER — SODIUM CHLORIDE 0.9 % (FLUSH) 0.9 %
5-10 SYRINGE (ML) INJECTION EVERY 8 HOURS
Status: DISCONTINUED | OUTPATIENT
Start: 2018-08-29 | End: 2018-09-01 | Stop reason: HOSPADM

## 2018-08-29 RX ORDER — MAGNESIUM SULFATE 100 %
4 CRYSTALS MISCELLANEOUS AS NEEDED
Status: DISCONTINUED | OUTPATIENT
Start: 2018-08-29 | End: 2018-08-29 | Stop reason: HOSPADM

## 2018-08-29 RX ORDER — FENTANYL CITRATE 50 UG/ML
INJECTION, SOLUTION INTRAMUSCULAR; INTRAVENOUS AS NEEDED
Status: DISCONTINUED | OUTPATIENT
Start: 2018-08-29 | End: 2018-08-29 | Stop reason: HOSPADM

## 2018-08-29 RX ORDER — MONTELUKAST SODIUM 10 MG/1
10 TABLET ORAL
Status: DISCONTINUED | OUTPATIENT
Start: 2018-08-29 | End: 2018-09-01 | Stop reason: HOSPADM

## 2018-08-29 RX ORDER — ACETAMINOPHEN 325 MG/1
650 TABLET ORAL
Status: DISCONTINUED | OUTPATIENT
Start: 2018-08-29 | End: 2018-09-01 | Stop reason: HOSPADM

## 2018-08-29 RX ORDER — GABAPENTIN 300 MG/1
300 CAPSULE ORAL 2 TIMES DAILY
Status: DISCONTINUED | OUTPATIENT
Start: 2018-08-29 | End: 2018-09-01 | Stop reason: HOSPADM

## 2018-08-29 RX ORDER — DEXTROSE MONOHYDRATE 25 G/50ML
25-50 INJECTION, SOLUTION INTRAVENOUS AS NEEDED
Status: DISCONTINUED | OUTPATIENT
Start: 2018-08-29 | End: 2018-08-29 | Stop reason: HOSPADM

## 2018-08-29 RX ORDER — VECURONIUM BROMIDE FOR INJECTION 1 MG/ML
INJECTION, POWDER, LYOPHILIZED, FOR SOLUTION INTRAVENOUS AS NEEDED
Status: DISCONTINUED | OUTPATIENT
Start: 2018-08-29 | End: 2018-08-29 | Stop reason: HOSPADM

## 2018-08-29 RX ORDER — DOCUSATE SODIUM 100 MG/1
100 CAPSULE, LIQUID FILLED ORAL 2 TIMES DAILY
Status: DISCONTINUED | OUTPATIENT
Start: 2018-08-29 | End: 2018-09-01 | Stop reason: HOSPADM

## 2018-08-29 RX ORDER — NEOSTIGMINE METHYLSULFATE 1 MG/ML
INJECTION INTRAVENOUS AS NEEDED
Status: DISCONTINUED | OUTPATIENT
Start: 2018-08-29 | End: 2018-08-29 | Stop reason: HOSPADM

## 2018-08-29 RX ORDER — ADHESIVE BANDAGE
30 BANDAGE TOPICAL DAILY PRN
Status: DISCONTINUED | OUTPATIENT
Start: 2018-08-29 | End: 2018-09-01 | Stop reason: HOSPADM

## 2018-08-29 RX ORDER — DEXAMETHASONE SODIUM PHOSPHATE 4 MG/ML
INJECTION, SOLUTION INTRA-ARTICULAR; INTRALESIONAL; INTRAMUSCULAR; INTRAVENOUS; SOFT TISSUE AS NEEDED
Status: DISCONTINUED | OUTPATIENT
Start: 2018-08-29 | End: 2018-08-29 | Stop reason: HOSPADM

## 2018-08-29 RX ORDER — ONDANSETRON 2 MG/ML
4 INJECTION INTRAMUSCULAR; INTRAVENOUS
Status: DISCONTINUED | OUTPATIENT
Start: 2018-08-29 | End: 2018-09-01 | Stop reason: HOSPADM

## 2018-08-29 RX ORDER — INSULIN LISPRO 100 [IU]/ML
INJECTION, SOLUTION INTRAVENOUS; SUBCUTANEOUS ONCE
Status: COMPLETED | OUTPATIENT
Start: 2018-08-29 | End: 2018-08-29

## 2018-08-29 RX ORDER — HYDROMORPHONE HYDROCHLORIDE 1 MG/ML
INJECTION, SOLUTION INTRAMUSCULAR; INTRAVENOUS; SUBCUTANEOUS AS NEEDED
Status: DISCONTINUED | OUTPATIENT
Start: 2018-08-29 | End: 2018-08-29 | Stop reason: HOSPADM

## 2018-08-29 RX ORDER — ALBUTEROL SULFATE 0.83 MG/ML
2.5 SOLUTION RESPIRATORY (INHALATION)
Status: DISCONTINUED | OUTPATIENT
Start: 2018-08-29 | End: 2018-09-01 | Stop reason: HOSPADM

## 2018-08-29 RX ORDER — DIPHENHYDRAMINE HYDROCHLORIDE 50 MG/ML
25 INJECTION, SOLUTION INTRAMUSCULAR; INTRAVENOUS
Status: DISCONTINUED | OUTPATIENT
Start: 2018-08-29 | End: 2018-08-29 | Stop reason: HOSPADM

## 2018-08-29 RX ORDER — ARIPIPRAZOLE 10 MG/1
30 TABLET ORAL DAILY
Status: DISCONTINUED | OUTPATIENT
Start: 2018-08-30 | End: 2018-09-01 | Stop reason: HOSPADM

## 2018-08-29 RX ORDER — FENTANYL CITRATE 50 UG/ML
50 INJECTION, SOLUTION INTRAMUSCULAR; INTRAVENOUS AS NEEDED
Status: DISCONTINUED | OUTPATIENT
Start: 2018-08-29 | End: 2018-08-29 | Stop reason: HOSPADM

## 2018-08-29 RX ORDER — DEXTROSE 50 % IN WATER (D50W) INTRAVENOUS SYRINGE
25-50 AS NEEDED
Status: DISCONTINUED | OUTPATIENT
Start: 2018-08-29 | End: 2018-09-01 | Stop reason: HOSPADM

## 2018-08-29 RX ORDER — DIAZEPAM 10 MG/2ML
5 INJECTION INTRAMUSCULAR ONCE
Status: COMPLETED | OUTPATIENT
Start: 2018-08-29 | End: 2018-08-29

## 2018-08-29 RX ORDER — FAMOTIDINE 20 MG/1
20 TABLET, FILM COATED ORAL ONCE
Status: COMPLETED | OUTPATIENT
Start: 2018-08-29 | End: 2018-08-29

## 2018-08-29 RX ORDER — INSULIN LISPRO 100 [IU]/ML
INJECTION, SOLUTION INTRAVENOUS; SUBCUTANEOUS ONCE
Status: DISPENSED | OUTPATIENT
Start: 2018-08-29 | End: 2018-08-29

## 2018-08-29 RX ORDER — CEFAZOLIN SODIUM 2 G/50ML
2 SOLUTION INTRAVENOUS
Status: COMPLETED | OUTPATIENT
Start: 2018-08-29 | End: 2018-08-29

## 2018-08-29 RX ORDER — EPHEDRINE SULFATE 50 MG/ML
INJECTION, SOLUTION INTRAVENOUS AS NEEDED
Status: DISCONTINUED | OUTPATIENT
Start: 2018-08-29 | End: 2018-08-29 | Stop reason: HOSPADM

## 2018-08-29 RX ORDER — OXYCODONE AND ACETAMINOPHEN 10; 325 MG/1; MG/1
2 TABLET ORAL
Status: DISCONTINUED | OUTPATIENT
Start: 2018-08-29 | End: 2018-08-29

## 2018-08-29 RX ORDER — HYDROCODONE BITARTRATE AND ACETAMINOPHEN 5; 325 MG/1; MG/1
1 TABLET ORAL ONCE
Status: DISCONTINUED | OUTPATIENT
Start: 2018-08-29 | End: 2018-08-29 | Stop reason: HOSPADM

## 2018-08-29 RX ORDER — DIAZEPAM 5 MG/1
5 TABLET ORAL
Status: DISCONTINUED | OUTPATIENT
Start: 2018-08-29 | End: 2018-09-01 | Stop reason: HOSPADM

## 2018-08-29 RX ORDER — MORPHINE SULFATE 4 MG/ML
4 INJECTION, SOLUTION INTRAMUSCULAR; INTRAVENOUS
Status: DISCONTINUED | OUTPATIENT
Start: 2018-08-29 | End: 2018-08-31

## 2018-08-29 RX ORDER — SODIUM CHLORIDE, SODIUM LACTATE, POTASSIUM CHLORIDE, CALCIUM CHLORIDE 600; 310; 30; 20 MG/100ML; MG/100ML; MG/100ML; MG/100ML
75 INJECTION, SOLUTION INTRAVENOUS CONTINUOUS
Status: DISCONTINUED | OUTPATIENT
Start: 2018-08-29 | End: 2018-08-29 | Stop reason: HOSPADM

## 2018-08-29 RX ORDER — SODIUM CHLORIDE 0.9 % (FLUSH) 0.9 %
5-10 SYRINGE (ML) INJECTION AS NEEDED
Status: DISCONTINUED | OUTPATIENT
Start: 2018-08-29 | End: 2018-09-01 | Stop reason: HOSPADM

## 2018-08-29 RX ORDER — HYDROMORPHONE HYDROCHLORIDE 2 MG/ML
0.5 INJECTION, SOLUTION INTRAMUSCULAR; INTRAVENOUS; SUBCUTANEOUS
Status: DISCONTINUED | OUTPATIENT
Start: 2018-08-29 | End: 2018-08-29 | Stop reason: HOSPADM

## 2018-08-29 RX ORDER — MIDAZOLAM HYDROCHLORIDE 1 MG/ML
INJECTION, SOLUTION INTRAMUSCULAR; INTRAVENOUS AS NEEDED
Status: DISCONTINUED | OUTPATIENT
Start: 2018-08-29 | End: 2018-08-29 | Stop reason: HOSPADM

## 2018-08-29 RX ORDER — LIDOCAINE HYDROCHLORIDE 10 MG/ML
0.1 INJECTION, SOLUTION EPIDURAL; INFILTRATION; INTRACAUDAL; PERINEURAL AS NEEDED
Status: DISCONTINUED | OUTPATIENT
Start: 2018-08-29 | End: 2018-09-01 | Stop reason: HOSPADM

## 2018-08-29 RX ORDER — INSULIN LISPRO 100 [IU]/ML
INJECTION, SOLUTION INTRAVENOUS; SUBCUTANEOUS
Status: COMPLETED
Start: 2018-08-29 | End: 2018-08-29

## 2018-08-29 RX ORDER — LIDOCAINE HYDROCHLORIDE 20 MG/ML
INJECTION, SOLUTION EPIDURAL; INFILTRATION; INTRACAUDAL; PERINEURAL AS NEEDED
Status: DISCONTINUED | OUTPATIENT
Start: 2018-08-29 | End: 2018-08-29 | Stop reason: HOSPADM

## 2018-08-29 RX ORDER — GLYCOPYRROLATE 0.2 MG/ML
INJECTION INTRAMUSCULAR; INTRAVENOUS AS NEEDED
Status: DISCONTINUED | OUTPATIENT
Start: 2018-08-29 | End: 2018-08-29 | Stop reason: HOSPADM

## 2018-08-29 RX ORDER — ALBUTEROL SULFATE 90 UG/1
AEROSOL, METERED RESPIRATORY (INHALATION) AS NEEDED
Status: DISCONTINUED | OUTPATIENT
Start: 2018-08-29 | End: 2018-08-29 | Stop reason: HOSPADM

## 2018-08-29 RX ORDER — MAGNESIUM SULFATE 100 %
4 CRYSTALS MISCELLANEOUS AS NEEDED
Status: DISCONTINUED | OUTPATIENT
Start: 2018-08-29 | End: 2018-09-01 | Stop reason: HOSPADM

## 2018-08-29 RX ORDER — SODIUM CHLORIDE, SODIUM LACTATE, POTASSIUM CHLORIDE, CALCIUM CHLORIDE 600; 310; 30; 20 MG/100ML; MG/100ML; MG/100ML; MG/100ML
75 INJECTION, SOLUTION INTRAVENOUS CONTINUOUS
Status: DISPENSED | OUTPATIENT
Start: 2018-08-29 | End: 2018-08-30

## 2018-08-29 RX ORDER — OXYCODONE AND ACETAMINOPHEN 10; 325 MG/1; MG/1
1-2 TABLET ORAL
Status: DISCONTINUED | OUTPATIENT
Start: 2018-08-29 | End: 2018-09-01 | Stop reason: HOSPADM

## 2018-08-29 RX ORDER — SODIUM CHLORIDE AND POTASSIUM CHLORIDE .9; .15 G/100ML; G/100ML
SOLUTION INTRAVENOUS CONTINUOUS
Status: DISCONTINUED | OUTPATIENT
Start: 2018-08-29 | End: 2018-08-30

## 2018-08-29 RX ORDER — PROPOFOL 10 MG/ML
INJECTION, EMULSION INTRAVENOUS AS NEEDED
Status: DISCONTINUED | OUTPATIENT
Start: 2018-08-29 | End: 2018-08-29 | Stop reason: HOSPADM

## 2018-08-29 RX ORDER — ONDANSETRON 2 MG/ML
INJECTION INTRAMUSCULAR; INTRAVENOUS AS NEEDED
Status: DISCONTINUED | OUTPATIENT
Start: 2018-08-29 | End: 2018-08-29 | Stop reason: HOSPADM

## 2018-08-29 RX ORDER — MORPHINE SULFATE 4 MG/ML
10 INJECTION, SOLUTION INTRAMUSCULAR; INTRAVENOUS
Status: DISCONTINUED | OUTPATIENT
Start: 2018-08-29 | End: 2018-08-29

## 2018-08-29 RX ADMIN — LIDOCAINE HYDROCHLORIDE 100 MG: 20 INJECTION, SOLUTION EPIDURAL; INFILTRATION; INTRACAUDAL; PERINEURAL at 07:44

## 2018-08-29 RX ADMIN — HYDROMORPHONE HYDROCHLORIDE 0.25 MG: 1 INJECTION, SOLUTION INTRAMUSCULAR; INTRAVENOUS; SUBCUTANEOUS at 11:54

## 2018-08-29 RX ADMIN — EPHEDRINE SULFATE 10 MG: 50 INJECTION, SOLUTION INTRAVENOUS at 09:34

## 2018-08-29 RX ADMIN — ONDANSETRON 4 MG: 2 INJECTION INTRAMUSCULAR; INTRAVENOUS at 11:34

## 2018-08-29 RX ADMIN — MORPHINE SULFATE 10 MG: 4 INJECTION, SOLUTION INTRAMUSCULAR; INTRAVENOUS at 16:22

## 2018-08-29 RX ADMIN — DEXAMETHASONE SODIUM PHOSPHATE 4 MG: 4 INJECTION, SOLUTION INTRA-ARTICULAR; INTRALESIONAL; INTRAMUSCULAR; INTRAVENOUS; SOFT TISSUE at 08:09

## 2018-08-29 RX ADMIN — PROPOFOL 130 MG: 10 INJECTION, EMULSION INTRAVENOUS at 07:44

## 2018-08-29 RX ADMIN — NEOSTIGMINE METHYLSULFATE 3 MG: 1 INJECTION INTRAVENOUS at 11:34

## 2018-08-29 RX ADMIN — VECURONIUM BROMIDE FOR INJECTION 2 MG: 1 INJECTION, POWDER, LYOPHILIZED, FOR SOLUTION INTRAVENOUS at 11:00

## 2018-08-29 RX ADMIN — SODIUM CHLORIDE, SODIUM LACTATE, POTASSIUM CHLORIDE, AND CALCIUM CHLORIDE 75 ML/HR: 600; 310; 30; 20 INJECTION, SOLUTION INTRAVENOUS at 13:00

## 2018-08-29 RX ADMIN — INSULIN LISPRO 6 UNITS: 100 INJECTION, SOLUTION INTRAVENOUS; SUBCUTANEOUS at 12:35

## 2018-08-29 RX ADMIN — OXYCODONE HYDROCHLORIDE AND ACETAMINOPHEN 1 TABLET: 10; 325 TABLET ORAL at 18:52

## 2018-08-29 RX ADMIN — CEFAZOLIN SODIUM 2 G: 2 SOLUTION INTRAVENOUS at 11:31

## 2018-08-29 RX ADMIN — SODIUM CHLORIDE, SODIUM LACTATE, POTASSIUM CHLORIDE, AND CALCIUM CHLORIDE 75 ML/HR: 600; 310; 30; 20 INJECTION, SOLUTION INTRAVENOUS at 07:03

## 2018-08-29 RX ADMIN — FENTANYL CITRATE 100 MCG: 50 INJECTION, SOLUTION INTRAMUSCULAR; INTRAVENOUS at 08:24

## 2018-08-29 RX ADMIN — Medication 10 ML: at 14:49

## 2018-08-29 RX ADMIN — SODIUM CHLORIDE, SODIUM LACTATE, POTASSIUM CHLORIDE, AND CALCIUM CHLORIDE: 600; 310; 30; 20 INJECTION, SOLUTION INTRAVENOUS at 09:54

## 2018-08-29 RX ADMIN — MIDAZOLAM HYDROCHLORIDE 2 MG: 1 INJECTION, SOLUTION INTRAMUSCULAR; INTRAVENOUS at 07:36

## 2018-08-29 RX ADMIN — SODIUM CHLORIDE AND POTASSIUM CHLORIDE: 9; 1.49 INJECTION, SOLUTION INTRAVENOUS at 14:49

## 2018-08-29 RX ADMIN — HYDROMORPHONE HYDROCHLORIDE 0.5 MG: 1 INJECTION, SOLUTION INTRAMUSCULAR; INTRAVENOUS; SUBCUTANEOUS at 11:44

## 2018-08-29 RX ADMIN — HYDROMORPHONE HYDROCHLORIDE 0.25 MG: 1 INJECTION, SOLUTION INTRAMUSCULAR; INTRAVENOUS; SUBCUTANEOUS at 11:52

## 2018-08-29 RX ADMIN — GLYCOPYRROLATE 0.4 MG: 0.2 INJECTION INTRAMUSCULAR; INTRAVENOUS at 11:34

## 2018-08-29 RX ADMIN — FENTANYL CITRATE 100 MCG: 50 INJECTION, SOLUTION INTRAMUSCULAR; INTRAVENOUS at 07:40

## 2018-08-29 RX ADMIN — CEFAZOLIN 1 G: 1 INJECTION, POWDER, FOR SOLUTION INTRAMUSCULAR; INTRAVENOUS at 17:21

## 2018-08-29 RX ADMIN — VECURONIUM BROMIDE FOR INJECTION 8 MG: 1 INJECTION, POWDER, LYOPHILIZED, FOR SOLUTION INTRAVENOUS at 07:44

## 2018-08-29 RX ADMIN — CEFAZOLIN SODIUM 2 G: 2 SOLUTION INTRAVENOUS at 07:50

## 2018-08-29 RX ADMIN — HYDROMORPHONE HYDROCHLORIDE 0.5 MG: 1 INJECTION, SOLUTION INTRAMUSCULAR; INTRAVENOUS; SUBCUTANEOUS at 11:57

## 2018-08-29 RX ADMIN — Medication 10 ML: at 22:00

## 2018-08-29 RX ADMIN — VECURONIUM BROMIDE FOR INJECTION 2 MG: 1 INJECTION, POWDER, LYOPHILIZED, FOR SOLUTION INTRAVENOUS at 08:28

## 2018-08-29 RX ADMIN — FAMOTIDINE 20 MG: 20 TABLET ORAL at 07:01

## 2018-08-29 RX ADMIN — DOCUSATE SODIUM 100 MG: 100 CAPSULE, LIQUID FILLED ORAL at 20:39

## 2018-08-29 RX ADMIN — NEOSTIGMINE METHYLSULFATE 1 MG: 1 INJECTION INTRAVENOUS at 11:39

## 2018-08-29 RX ADMIN — MONTELUKAST SODIUM 10 MG: 10 TABLET, FILM COATED ORAL at 23:29

## 2018-08-29 RX ADMIN — EPHEDRINE SULFATE 5 MG: 50 INJECTION, SOLUTION INTRAVENOUS at 09:08

## 2018-08-29 RX ADMIN — GABAPENTIN 300 MG: 300 CAPSULE ORAL at 20:39

## 2018-08-29 RX ADMIN — OXYCODONE HYDROCHLORIDE AND ACETAMINOPHEN 1 TABLET: 10; 325 TABLET ORAL at 23:29

## 2018-08-29 RX ADMIN — ALBUTEROL SULFATE 2 PUFF: 90 AEROSOL, METERED RESPIRATORY (INHALATION) at 07:40

## 2018-08-29 RX ADMIN — GLYCOPYRROLATE 0.2 MG: 0.2 INJECTION INTRAMUSCULAR; INTRAVENOUS at 11:39

## 2018-08-29 RX ADMIN — Medication 5 MG: at 12:38

## 2018-08-29 NOTE — PROGRESS NOTES
1355 received via bed from [facility] at bedside 1430 medicated for pain Tolerated clear liquids, no nausea 
1900 medicated with percocet Bedside shift report given to OPAL Stafford with sbar

## 2018-08-29 NOTE — ANESTHESIA POSTPROCEDURE EVALUATION
Post-Anesthesia Evaluation and Assessment Patient: Malachi Cristina MRN: [id number]  SSN: [id number] YOB: 1965  Age: 48 y.o. Sex: female Cardiovascular Function/Vital Signs Visit Vitals  /76  Pulse 68  Temp 36.3 °C (97.4 °F)  Resp 16  
 Ht 5' 7.5\" (1.715 m)  Wt 85.3 kg (188 lb)  SpO2 96%  BMI 29.01 kg/m2 Patient is status post general anesthesia for Procedure(s): 
remove globus and explore fusion l4-s1,decompression l3-4 and left l4-s1, TRANSFORAMINAL  INTERBODY FUSION l5-s1, 
flospine l4-s1,iliac bolts,posterior spine fusion l4-s1,bmp. Nausea/Vomiting: None Postoperative hydration reviewed and adequate. Pain: 
Pain Scale 1: Visual (08/29/18 1313) Pain Intensity 1: 0 (08/29/18 1313) Managed Neurological Status:  
Neuro (WDL): Within Defined Limits (08/29/18 1313) Neuro Neurologic State: Sleeping;Eyes open to voice (08/29/18 1313) Orientation Level: Oriented to person (08/29/18 1313) LUE Motor Response: Purposeful (08/29/18 1313) LLE Motor Response: Purposeful (08/29/18 1313) RUE Motor Response: Purposeful (08/29/18 1313) RLE Motor Response: Purposeful (08/29/18 1313) At baseline Mental Status and Level of Consciousness: Arousable Pulmonary Status:  
O2 Device: Nasal cannula (08/29/18 1407) Adequate oxygenation and airway patent Complications related to anesthesia: None Post-anesthesia assessment completed. No concerns Signed By: Van Willett MD   
 August 29, 2018

## 2018-08-29 NOTE — PERIOP NOTES
1217  Patient received in PACU and connected to monitors. Vital signs stable. RN at bedside. Will continue to monitor. 1235  Pt post op blood sugar = 202, medicated per MAR. Dr. Meng Farnsworth at bedside to check on pt, pt remains extremely drowsy post op. 1238  Pt attempting to sit up in bed. HOB raised to 35 degrees and pillows placed behind pt for comfort. 26  Spoke to patient's family (daughter) via phone in waiting area re status update and plan of care. Awaiting room assignment. Pt sitting up in bed, resting with eyes closed. Daughter states, pt \"takes medication at home that makes her really tired. \" 
 
[phone] 2 Central to give report. Per Rikki Mclaughlin RN will call back for report. 4315  TRANSFER - OUT REPORT: 
 
Verbal report given to Emelyn Muniz RN(name) on [employer]  being transferred to 2200(unit) for routine post - op Report consisted of patients Situation, Background, Assessment and  
Recommendations(SBAR). Information from the following report(s) SBAR, Kardex, OR Summary, Intake/Output, MAR and Recent Results was reviewed with the receiving nurse. Lines:  
Peripheral IV 08/29/18 Left Wrist (Active) Site Assessment Clean, dry, & intact 8/29/2018  1:13 PM  
Phlebitis Assessment 0 8/29/2018  1:13 PM  
Infiltration Assessment 0 8/29/2018  1:13 PM  
Dressing Status Clean, dry, & intact 8/29/2018  1:13 PM  
Dressing Type Transparent;Tape 8/29/2018  1:13 PM  
Hub Color/Line Status Pink; Infusing 8/29/2018  1:13 PM  
Action Taken Open ports on tubing capped 8/29/2018  1:13 PM  
Alcohol Cap Used Yes 8/29/2018  1:13 PM  
   
Peripheral IV 08/29/18 Right Antecubital (Active) Site Assessment Clean, dry, & intact 8/29/2018  1:13 PM  
Phlebitis Assessment 0 8/29/2018  1:13 PM  
Infiltration Assessment 0 8/29/2018  1:13 PM  
Dressing Status Clean, dry, & intact 8/29/2018  1:13 PM  
Dressing Type Transparent;Tape 8/29/2018  1:13 PM  
 Hub Color/Line Status Pink;Capped 8/29/2018  1:13 PM  
Action Taken Open ports on tubing capped 8/29/2018  1:13 PM  
Alcohol Cap Used Yes 8/29/2018  1:13 PM  
  
 
Opportunity for questions and clarification was provided. Patient transported with: 
 [employer]

## 2018-08-29 NOTE — CONSULTS
Asked  to see patient for monitoring and management of  acute and chronic medical problems     Diagnosis: pa  s32.009k       Procedure  Remove globus and explore fusion l4-s1,decompression l3-4 and left l4-s1, TRANSFORAMINAL  INTERBODY FUSION l5-s1,polaris l4-s1,iliac bolts,posterior spine fusion l4-s1,bmp    HPI: pleasant lady post uneventful L/S fusion and decompression surgery; presently with acceptable post op pain otherwise no other c/o     ACTIVE MEDICAL PROBLEMS/PMH/PSH  T2 DM dxd 2008; No SMBS;   No ED or  Hospitalization for Hyper/hypoglycemia  HTN  Asthma  Bipolar    Past Medical History:   Diagnosis Date    Asthma     Avascular necrosis of bone of right hip (HCC)     Chronic pain     Depression     Diabetes (HCC)     GERD (gastroesophageal reflux disease)     Hepatitis C     Hypertension     Liver disease     Nicotine vapor product user     Psychotic disorder      Sickle cell trait (HCC)       Decreased exercise tolerance   SOME SOB WITH 2 FLIGHTS OF STEPS                    NIDDM   Arthropathy, unspecified, site unspecified   ARTHRITIS-BACK AREA   Headache(784.0)              Piercing   EARS   Tattoo   RIGHT BREAST AREA,; RIGHT HAND 3 FINGERS   Bipolar disorder (HCC)       Leg cramps       Hepatitis   HEPATITIS C DX'D 1990'S   Mass of thigh   1.5 cm cystic mass lesion right ant/med   Breast mass   right 2.0 cystic mass lesion UOQ       PSH:  Past Surgical History:   Procedure Laterality Date    HX CHOLECYSTECTOMY      HX HEENT      EYE SX    HX ORTHOPAEDIC  2008    l4-l5-s1    HX ORTHOPAEDIC  3/2015    right foot decompression peroneal nerve     PTA MEDICATIONS     Prescriptions Prior to Admission   Medication Sig    potassium chloride (KLOR-CON) 10 mEq tablet TK 1 T PO D    INCRUSE ELLIPTA 62.5 mcg/actuation inhaler INL 1 PUFF PO QD    albuterol (VENTOLIN HFA) 90 mcg/actuation inhaler Take 2 Puffs by inhalation every six (6) hours as needed for Wheezing.     montelukast (SINGULAIR) 10 mg tablet TAKE 1 TABLET BY MOUTH DAILY    hydroCHLOROthiazide (HYDRODIURIL) 25 mg tablet TAKE 1 TABLET BY MOUTH DAILY    lisinopril (PRINIVIL, ZESTRIL) 5 mg tablet Take 1 Tab by mouth daily.  SITagliptin-metFORMIN (JANUMET)  mg per tablet Take 1 Tab by mouth two (2) times daily (with meals).  fluticasone-vilanterol (BREO ELLIPTA) 100-25 mcg/dose inhaler INHALE ONE PUFF BY MOUTH DAILY    ABILIFY 30 mg tablet daily.  escitalopram (LEXAPRO) 20 mg tablet Take 20 mg by mouth daily.  gabapentin (NEURONTIN) 600 mg tablet Take  by mouth two (2) times a day.  diazepam (VALIUM) 10 mg tablet Take 10 mg by mouth every six (6) hours as needed.  tapentadol (NUCYNTA) 100 mg tablet Take 100 mg by mouth every six (6) hours as needed.  omeprazole (PRILOSEC) 20 mg capsule Take 20 mg by mouth daily.     PROAIR RESPICLICK 80 mcg/actuation aepb INL 2 PFS PO Q 6 H PRF WHZ    TRUEPLUS LANCETS 30 gauge misc TEST GLUCOSE ONCE  DAILY    glucose blood VI test strips (BLOOD GLUCOSE TEST) strip True resultTest blood glucose daily    Lancets misc True results lancets Test glucose daily       ALLERGIES:  Allergies   Allergen Reactions    Aspirin Hives    Bactrim [Sulfamethoprim Ds] Swelling     Family History   Problem Relation Age of Onset    Cancer Mother     Lung Disease Mother     No Known Problems Father        FAMILY HISTORY   Heart Failure Maternal Aunt       Diabetes Maternal Grandmother       Hypertension Maternal Grandmother       COPD Mother       Diabetes Mother       Hypertension Mother       Other Family History Mother   emphysema   Sickle Cell Trait Sister       Other Family History Sister   emphysema   Thyroid Disease Sister       Father:  alcoholism  Mother:  emphysema  Brothers: no  4 Sisters : living  Children: 5 children     Social History     Social History    Marital status: LEGALLY      Spouse name: N/A    Number of children: N/A    Years of education: N/A     Occupational History    Not on file. Social History Main Topics    Smoking status: Former Smoker     Packs/day: 1.00     Years: 35.00     Quit date: 7/1/2018    Smokeless tobacco: Current User      Comment: Pt is using a vape cigarette    Alcohol use No    Drug use: No      Comment: recovering addict 12yrs    Sexual activity: Yes     Partners: Male     Birth control/ protection: None     Other Topics Concern    Not on file     Social History Narrative        SOCIAL HISTORY  Marital Status: , lives with family  Education Completed: 7th grade  Occupation: disabled  Tobacco use: smoked 43 yrs; 1ppd; quit 1.5 ms ago  Alcohol use: no  Drug use: no  STI: no  Living will: no  POA: no     ROS  Constitutional: No chills . No headache. Eyes:  No visual changes; wears glasses  Ears: No earache. No hearing loss. Nose: No congestion. No bleed  Neck: No pain. No masses   Cardiac: No CP. BLAKE ascending the stairs. . No orthopnea. No PND. No leg edema . No palpitation  Respiratory: Occasional cough . No SOB. No wheezing   GI: No nausea . No vomiting. No reflux. No abdominal pain. Normal BM . No black stool . No blood in the stool  : No dysuria. No hematuria  M/S:  + Back pain  Neuro: No weakness. No numbness. Skin: No rash. No itching  HEM/LYMPH: No enlarged LN. No bruising. Endocrine: No polydipsia. No polyuria. No change in tone of voice. No cold intolerance  Psych:  No anxiety . + depression.  No sleep difficulty    Patient Vitals for the past 24 hrs:   Temp Pulse Resp BP SpO2   08/29/18 1521 98.3 °F (36.8 °C) 69 16 112/75 96 %   08/29/18 1407 97.4 °F (36.3 °C) 68 16 111/76 96 %   08/29/18 1313 97.2 °F (36.2 °C) 85 10 110/63 98 %   08/29/18 1254 - 78 9 - 98 %   08/29/18 1253 - 78 8 (!) 87/49 98 %   08/29/18 1252 - 76 9 - 97 %   08/29/18 1238 - (!) 102 11 104/53 97 %   08/29/18 1229 - (!) 114 14 123/73 100 %   08/29/18 1224 - (!) 103 10 90/56 100 %   08/29/18 1219 - (!) 104 11 - 100 % 08/29/18 1217 98.9 °F (37.2 °C) 97 12 110/64 100 %   08/29/18 0645 - - - 131/82 -   08/29/18 0640 98 °F (36.7 °C) 61 16 - 95 %        PHYSICAL EXAMINATION  GENERAL: NAD; obese  HEENT:    Face:Symmetrical.    CONJUNCTIVA: mildly pale  SCLERA: Anicteric    OTOSCOPIC EXAM: B/L EAC/  TM Clear  NASAL MUCOSA: No Discharge.  No bleeding  ORAL MUCOSA: Moist   TONGUE: Moist; No lesions  TEETH:  Edentolous  GUMS: No bleeding or swelling  OROPHARYNX: No Swelling. No ulcers. No Masses  NECK: No JVD. No masses. No enlarged-tender lymph nodes.  Trachea in mid line.  Thyroid size normal. No thyroid nodules    CAROTID ARTERIES: Pulsation 2+ bilaterally. No bruits  LUNGS: CTA. BS Normal Bilaterally  HEART: RRR   Normal S1 S2. No Significant murmur. No S3 S4  ABDOMEN: Soft. Normal BS. No tenderness. No HSM /SMG. No palpable enlarged aorta. No bruits. LE: No edema; SCD in place  PULSES: Radial 2+; Femoral 2+; PT 2+    SKIN: No Rash. No Bruising. No broken skin  Dressing clean and dry    Moves all extremities    NEUROLOGIC:     Oriented X 4  Muscle Strength, Bulk & Tone  RUE: strength, bulk & tone: WNL  LUE: strength, bulk & tone: WNL  RLE: strength, bulk & tone: WNL  LLE: strength, bulk & tone:  WNL     Reflexes:    RUE: biceps 2+; triceps 2+; brachioradialis 2+    LUE: biceps 2+; triceps 2+; brachioradialis 2+                    RLE:patellar 2+; ankle 2 +  LLE: patellar 2+; ankle 2+     PSYCHIATRIC            Not Depressed  Not  Anxious                                        Affect:appropiate        Recent Results (from the past 24 hour(s))   HCG URINE, QL. - POC    Collection Time: 08/29/18  6:27 AM   Result Value Ref Range    Pregnancy test,urine (POC) NEGATIVE  NEG     TYPE & SCREEN    Collection Time: 08/29/18  6:58 AM   Result Value Ref Range    Crossmatch Expiration 09/01/2018     ABO/Rh(D) A POSITIVE     Antibody screen NEG    GLUCOSE, POC    Collection Time: 08/29/18  7:20 AM   Result Value Ref Range    Glucose (POC) 121 (H) 70 - 110 mg/dL   GLUCOSE, POC    Collection Time: 08/29/18 12:20 PM   Result Value Ref Range    Glucose (POC) 202 (H) 70 - 110 mg/dL   GLUCOSE, POC    Collection Time: 08/29/18  4:31 PM   Result Value Ref Range    Glucose (POC) 150 (H) 70 - 110 mg/dL         Recent Results (from the past 8 hour(s))   GLUCOSE, POC    Collection Time: 08/29/18 12:20 PM   Result Value Ref Range    Glucose (POC) 202 (H) 70 - 110 mg/dL   GLUCOSE, POC    Collection Time: 08/29/18  4:31 PM   Result Value Ref Range    Glucose (POC) 150 (H) 70 - 110 mg/dL       ASSESSMENT  Stable L/S fusion and decompression  HTN controlled  T2 DM  Asthma/COPD  Bipolar     PLAN  Analgesics   Resume PTA meds as condition allows  Insulin Lispro and basal   CBG  SCD  H/H      Ember Teixeira MD  8/29/2018, 5:17 PM

## 2018-08-29 NOTE — IP AVS SNAPSHOT
[address] Patient: Fransico Pagan MRN: [id number] PIQ:[id number] About your hospitalization You were admitted on:  August 29, 2018 You last received care in the:  [address] [place] [address],[address] You were discharged on:  September 1, 2018 Why you were hospitalized Your primary diagnosis was:  Not on File Your diagnoses also included:  Pseudoarthrosis Of Lumbar Spine, Pseudarthrosis Following Spinal Fusion Follow-up Information Follow up With Details Comments Contact Info Helen Wilson PA-C   [address] [facility] [address] [phone] Discharge Orders None A check josefina indicates which time of day the medication should be taken. My Medications START taking these medications Instructions Each Dose to Equal  
 Morning Noon Evening Bedtime  
 oxyCODONE-acetaminophen  mg per tablet Commonly known as:  PERCOCET 10 Your last dose was: Your next dose is: Take 1-2 Tabs by mouth every four (4) hours as needed. Max Daily Amount: 12 Tabs. 1-2 Tab CONTINUE taking these medications Instructions Each Dose to Equal  
 Morning Noon Evening Bedtime ABILIFY 30 mg tablet Generic drug:  ARIPiprazole Your last dose was: Your next dose is:    
   
   
 daily. * albuterol 90 mcg/actuation inhaler Commonly known as:  VENTOLIN HFA Your last dose was: Your next dose is: Take 2 Puffs by inhalation every six (6) hours as needed for Wheezing. 2 Puff * PROAIR RESPICLICK 90 mcg/actuation Aepb Generic drug:  albuterol sulfate Your last dose was: Your next dose is:    
   
   
 INL 2 PFS PO Q 6 H PRF WHZ  
     
   
   
   
  
 diazePAM 10 mg tablet Commonly known as:  VALIUM  
   
 Your last dose was: Your next dose is: Take 10 mg by mouth every six (6) hours as needed. 10 mg  
    
   
   
   
  
 fluticasone-vilanterol 100-25 mcg/dose inhaler Commonly known as:  BREO ELLIPTA Your last dose was: Your next dose is:    
   
   
 INHALE ONE PUFF BY MOUTH DAILY  
     
   
   
   
  
 gabapentin 600 mg tablet Commonly known as:  NEURONTIN Your last dose was: Your next dose is: Take  by mouth two (2) times a day. glucose blood VI test strips strip Commonly known as:  blood glucose test  
   
Your last dose was: Your next dose is:    
   
   
 True resultTest blood glucose daily  
     
   
   
   
  
 hydroCHLOROthiazide 25 mg tablet Commonly known as:  HYDRODIURIL Your last dose was: Your next dose is: TAKE 1 TABLET BY MOUTH DAILY INCRUSE ELLIPTA 62.5 mcg/actuation inhaler Generic drug:  umeclidinium Your last dose was: Your next dose is: INL 1 PUFF PO QD * Lancets Misc Your last dose was: Your next dose is:    
   
   
 True results lancets Test glucose daily * TRUEPLUS LANCETS 30 gauge Misc Generic drug:  lancets Your last dose was: Your next dose is:    
   
   
 TEST GLUCOSE ONCE  DAILY LEXAPRO 20 mg tablet Generic drug:  escitalopram oxalate Your last dose was: Your next dose is: Take 20 mg by mouth daily. 20 mg  
    
   
   
   
  
 lisinopril 5 mg tablet Commonly known as:  Conner Pettit Your last dose was: Your next dose is: Take 1 Tab by mouth daily. 5 mg  
    
   
   
   
  
 montelukast 10 mg tablet Commonly known as:  SINGULAIR Your last dose was: Your next dose is: TAKE 1 TABLET BY MOUTH DAILY NUCYNTA 100 mg tablet Generic drug:  tapentadol Your last dose was: Your next dose is: Take 100 mg by mouth every six (6) hours as needed. 100 mg  
    
   
   
   
  
 omeprazole 20 mg capsule Commonly known as:  PRILOSEC Your last dose was: Your next dose is: Take 20 mg by mouth daily. 20 mg  
    
   
   
   
  
 potassium chloride 10 mEq tablet Commonly known as:  KLOR-CON Your last dose was: Your next dose is:    
   
   
 TK 1 T PO D  
     
   
   
   
  
 SITagliptin-metFORMIN  mg per tablet Commonly known as:  Veronica Hodge Your last dose was: Your next dose is: Take 1 Tab by mouth two (2) times daily (with meals). 1 Tab * Notice: This list has 4 medication(s) that are the same as other medications prescribed for you. Read the directions carefully, and ask your doctor or other care provider to review them with you. Where to Get Your Medications Information on where to get these meds will be given to you by the nurse or doctor. ! Ask your nurse or doctor about these medications  
  oxyCODONE-acetaminophen  mg per tablet Opioid Education Prescription Opioids: What You Need to Know: 
 
 
 
F-face looks uneven A-arms unable to move or move unevenly S-speech slurred or non-existent T-time-call 911 as soon as signs and symptoms begin-DO NOT go  
 Back to bed or wait to see if you get better-TIME IS BRAIN. Warning Signs of HEART ATTACK Call 911 if you have these symptoms: 
? Chest discomfort. Most heart attacks involve discomfort in the center of the chest that lasts more than a few minutes, or that goes away and comes back. It can feel like uncomfortable pressure, squeezing, fullness, or pain. ? Discomfort in other areas of the upper body. Symptoms can include pain or discomfort in one or both arms, the back, neck, jaw, or stomach. ? Shortness of breath with or without chest discomfort. ? Other signs may include breaking out in a cold sweat, nausea, or lightheadedness. Don't wait more than five minutes to call [address]! Fast action can save your life. Calling 911 is almost always the fastest way to get lifesaving treatment. Emergency Medical Services staff can begin treatment when they arrive  up to an hour sooner than if someone gets to the hospital by car. Patient armband removed and shredded MyChart Activation Thank you for requesting access to [employer]. Please follow the instructions below to securely access and download your online medical record. [employer] allows you to send messages to your doctor, view your test results, renew your prescriptions, schedule appointments, and more. How Do I Sign Up? 1. In your internet browser, go to [email or web address] 
2. Click on the First Time User? Click Here link in the Sign In box. You will be redirect to the New Member Sign Up page. 3. Enter your [employer] Access Code exactly as it appears below. You will not need to use this code after youve completed the sign-up process. If you do not sign up before the expiration date, you must request a new code. [employer] Access Code: [email or web address] Expires: 2018  1:21 PM (This is the date your [employer] access code will ) 4.  Enter the last four digits of your Social Security Number (xxxx) and Date of Birth (mm/dd/yyyy) as indicated and click Submit. You will be taken to the next sign-up page. 5. Create a [employer] ID. This will be your [employer] login ID and cannot be changed, so think of one that is secure and easy to remember. 6. Create a [employer] password. You can change your password at any time. 7. Enter your Password Reset Question and Answer. This can be used at a later time if you forget your password. 8. Enter your e-mail address. You will receive e-mail notification when new information is available in [address] 9. Click Sign Up. You can now view and download portions of your medical record. 10. Click the Download Summary menu link to download a portable copy of your medical information. Additional Information If you have questions, please visit the Frequently Asked Questions section of the [employer] website at https://[email or web address]. [employer]"t/. Remember, [employer] is NOT to be used for urgent needs. For medical emergencies, dial 911. The discharge information has been reviewed with the patient and spouse. The patient and spouse verbalized understanding. Discharge medications reviewed with the patient and spouse 
 and appropriate educational materials and side effects teaching were provided. ___________________________________________________________________________________________________________________________________ ACO Transitions of Care Introducing [facility] [address] Jannette Sahu offers a voluntary care coordination program to provide high quality service and care to [facility] fee-for-service beneficiaries. Teodoro Lorenz was designed to help you enhance your health and well-being through the following services: ? Transitions of Care  support for individuals who are transitioning from one care setting to another (example: Hospital to home). ?  Chronic and Complex Care Coordination  support for individuals and caregivers of those with serious or chronic illnesses or with more than one chronic (ongoing) condition and those who take a number of different medications. If you meet specific medical criteria, a [facility] [address] may call you directly to coordinate your care with your primary care physician and your other care providers. For questions about the [facility] programs, please, contact your physicians office. For general questions or additional information about Accountable Care Organizations: 
Please visit www.medicare.gov/acos. html or call 1-800-MEDICARE (2-427.271.4738) TTY users should call 0-559.253.5543. [facility] Announcement We are excited to announce that we are making your provider's discharge notes available to you in [facility]. You will see these notes when they are completed and signed by the physician that discharged you from your recent hospital stay. If you have any questions or concerns about any information you see in [facility], please call the Health Information Department where you were seen or reach out to your Primary Care Provider for more information about your plan of care. Introducing [facility]! Virginia Barnes introduces [facility] patient portal. Now you can access parts of your medical record, email your doctor's office, and request medication refills online. 1. In your internet browser, go to [email or web address] 2. Click on the First Time User? Click Here link in the Sign In box. You will see the New Member Sign Up page. 3. Enter your [facility] Access Code exactly as it appears below. You will not need to use this code after youve completed the sign-up process. If you do not sign up before the expiration date, you must request a new code. · [facility] Access Code: [email or web address] Expires: 11/4/2018  1:21 PM 
 
4.  Enter the last four digits of your Social Security Number (xxxx) and Date of Birth (mm/dd/yyyy) as indicated and click Submit. You will be taken to the next sign-up page. 5. Create a Mysteriot ID. This will be your [employer] login ID and cannot be changed, so think of one that is secure and easy to remember. 6. Create a Mysteriot password. You can change your password at any time. 7. Enter your Password Reset Question and Answer. This can be used at a later time if you forget your password. 8. Enter your e-mail address. You will receive e-mail notification when new information is available in [address] 9. Click Sign Up. You can now view and download portions of your medical record. 10. Click the Download Summary menu link to download a portable copy of your medical information. If you have questions, please visit the Frequently Asked Questions section of the [employer] website. Remember, [employer] is NOT to be used for urgent needs. For medical emergencies, dial 911. Now available from your iPhone and Android! Introducing [employer] As a [facility] patient, I wanted to make you aware of our electronic visit tool called Perfecto Segura. [facility] 24/7 allows you to connect within minutes with a medical provider 24 hours a day, seven days a week via a mobile device or tablet or logging into a secure website from your computer. You can access Perfecto Segura from anywhere in the [place]. A virtual visit might be right for you when you have a simple condition and feel like you just dont want to get out of bed, or cant get away from work for an appointment, when your regular [facility] provider is not available (evenings, weekends or holidays), or when youre out of town and need minor care. Electronic visits cost only $49 and if the [facility] 24/7 provider determines a prescription is needed to treat your condition, one can be electronically transmitted to a nearby pharmacy*. Please take a moment to enroll today if you have not already done so. The enrollment process is free and takes just a few minutes. To enroll, please download the [employer] 24/7 mitul to your tablet or phone, or visit [email or web address] to enroll on your computer. And, as an [address] [place] [address] patient with a [employer] account, the results of your visits will be scanned into your electronic medical record and your primary care provider will be able to view the scanned results. We urge you to continue to see your regular [facility] provider for your ongoing medical care. And while your primary care provider may not be the one available when you seek a [facility] virtual visit, the peace of mind you get from getting a real diagnosis real time can be priceless. For more information on Alumnize, view our Frequently Asked Questions (FAQs) at [email or web address]. Sincerely, 
 
Mayelin Fraire MD 
Chief Medical Officer [employer] *:  certain medications cannot be prescribed via [facility]ize Unresulted Labs-Please follow up with your PCP about these lab tests Order Current Status NC XR TECHNOLOGIST SERVICE In process CULTURE, URINE Preliminary result Providers Seen During Your Hospitalization Provider Specialty Primary office phone Asher Li MD Orthopedic Surgery [phone] Your Primary Care Physician (PCP) Primary Care Physician Office Phone Office Fax Esther Orozco [phone] You are allergic to the following Allergen Reactions Aspirin Hives Bactrim (Sulfamethoprim Ds) Swelling Recent Documentation Height Weight BMI OB Status Smoking Status 1.715 m 85.3 kg 29.01 kg/m2 Postmenopausal Former Smoker Emergency Contacts Name Discharge Info Relation Home Work Mobile [address] CAREGIVER [3] Daughter [21] [phone] [facility] FACILITY DISCHARGE CAREGIVER [3] Sister [23]   [phone] Patient Belongings The following personal items are in your possession at time of discharge: 
  Dental Appliances: At bedside  Visual Aid: Glasses, With patient      Home Medications: None   Jewelry: None  Clothing: Shirt, Footwear, Pants, Undergarments    Other Valuables: Eyeglasses Discharge Instructions Attachments/References LUMBAR SPINAL FUSION: POST-OP (ENGLISH) OXYCODONE/ACETAMINOPHEN (BY MOUTH) (ENGLISH) Patient Handouts Lumbar Spinal Fusion: What to Expect at Home Your Recovery After surgery, you can expect your back to feel stiff and sore. You may have trouble sitting or standing in one position for very long and may need pain medicine in the weeks after your surgery. It may take 4 to 6 weeks to get back to doing simple activities, such as light housework. It may take 6 months to a year for your back to get better completely. You may need to wear a back brace while your back heals. And your doctor may have you go to physical therapy. If your job doesn't require physical labor, you will probably be able to go back to work after 1 or 2 months. If your job involves light physical labor, it may take 3 to 6 months. Most people whose jobs involved heavy labor can never return to those jobs. This care sheet gives you a general idea about how long it will take for you to recover. But each person recovers at a different pace. Follow the steps below to get better as quickly as possible. How can you care for yourself at home? Activity 
  · Rest when you feel tired. Getting enough sleep will help you recover.  
  · Try to walk each day. Start by walking a little more than you did the day before. Bit by bit, increase the amount you walk. Walking boosts blood flow and helps prevent pneumonia and constipation. Walking may also decrease your muscle soreness after surgery.   · If advised by your doctor, you may need to avoid lifting anything that would cause excessive strain on your back. This may include a child, heavy grocery bags and milk containers, a heavy briefcase or backpack, cat litter or dog food bags, or a vacuum .  
  · Avoid strenuous activities, such as bicycle riding, jogging, weight lifting, or aerobic exercise, until your doctor says it is okay.  
  · Do not drive for 2 to 4 weeks after your surgery or until your doctor says it is okay.  
  · Avoid riding in a car for more than 30 minutes at a time for 2 to 4 weeks after surgery. If you must ride in a car for a longer distance, stop often to walk and stretch your legs.  
  · Try to change your position about every 30 minutes while sitting or standing. This will help decrease your back pain while you are healing.  
  · You will probably need to take at least 4 to 6 weeks off from work. It depends on the type of work you do and how you feel.  
  · You may have sex as soon as you feel able, but avoid positions that put stress on your back or cause pain. Diet 
  · You can eat your normal diet. If your stomach is upset, try bland, low-fat foods like plain rice, broiled chicken, toast, and yogurt.  
  · Drink plenty of fluids (unless your doctor tells you not to).  
  · You may notice that your bowel movements are not regular right after your surgery. This is common. Try to avoid constipation and straining with bowel movements. You may want to take a fiber supplement every day. If you have not had a bowel movement after a couple of days, ask your doctor about taking a mild laxative. Medicines 
  · Be safe with medicines. Take pain medicines exactly as directed. ¨ If the doctor gave you a prescription medicine for pain, take it as prescribed. ¨ If you are not taking a prescription pain medicine, ask your doctor if you can take an over-the-counter medicine.   · If your doctor prescribed antibiotics, take them as directed. Do not stop taking them just because you feel better. You need to take the full course of antibiotics.  
  · If you think your pain medicine is making you sick to your stomach: 
¨ Take your medicine after meals (unless your doctor has told you not to). ¨ Ask your doctor for a different pain medicine. Incision care 
  · You will be given specific instructions about how to care for the cuts (incisions) the doctor made. The instructions will depend on the type of materials used to close the cut. Exercise 
  · Do back exercises as instructed by your doctor.  
  · Your doctor may advise you to work with a physical therapist to improve the strength and flexibility of your back. Other instructions 
  · To reduce stiffness and help sore muscles, use a warm water bottle, a heating pad set on low, or a warm cloth on your back. Do not put heat right over the incision. Do not go to sleep with a heating pad on your skin. Follow-up care is a key part of your treatment and safety. Be sure to make and go to all appointments, and call your doctor if you are having problems. It's also a good idea to know your test results and keep a list of the medicines you take. When should you call for help? Call 911 anytime you think you may need emergency care. For example, call if: 
  · You passed out (lost consciousness).  
  · You have sudden chest pain and shortness of breath, or you cough up blood.  
  · You are unable to move a leg at all.  
[facility] your doctor now or seek immediate medical care if: 
  · You have pain that does not get better after you take pain pills.  
  · You have new or worse symptoms in your legs or buttocks. Symptoms may include: ¨ Numbness or tingling. ¨ Weakness. ¨ Pain.  
  · You lose bladder or bowel control.  
  · You have loose stitches, or your incision comes open.  
  · You have blood or fluid draining from the incision.   · You have signs of infection, such as: 
¨ Increased pain, swelling, warmth, or redness. ¨ Pus draining from the incision. ¨ A fever.  
 Watch closely for any changes in your health, and be sure to contact your doctor if: 
  · You do not have a bowel movement after taking a laxative.  
  · You are not getting better as expected. Where can you learn more? Go to http://marisela-darryn.info/. Enter B493 in the search box to learn more about \"Lumbar Spinal Fusion: What to Expect at Home. \" Current as of: November 29, 2017 Content Version: 11.7 © 9184-4423 Say-Hey. Care instructions adapted under license by [employer] (which disclaims liability or warranty for this information). If you have questions about a medical condition or this instruction, always ask your healthcare professional. [address] any warranty or liability for your use of this information. Oxycodone/Acetaminophen (By mouth) Acetaminophen (w-cjft-b-MIN-oh-fen), Oxycodone Hydrochloride (yr-d-KDI-done jenifer-droe-KLOR-bladimir) Treats moderate to moderately severe pain. This medicine is a narcotic pain reliever. Brand Name(s): Endocet, Percocet, Primlev, Xartemis XR There may be other brand names for this medicine. When This Medicine Should Not Be Used: This medicine is not right for everyone. Do not use it if you had an allergic reaction to acetaminophen or oxycodone, or if you have serious breathing problems or paralytic ileus. How to Use This Medicine:  
Capsule, Liquid, Tablet, Long Acting Tablet · Your doctor will tell you how much medicine to use. Do not use more than directed. · An overdose can be dangerous. Follow directions carefully so you do not get too much medicine at one time. · Oral liquid: Measure the oral liquid medicine with a marked measuring spoon, oral syringe, or medicine cup. · Swallow the extended-release tablet whole. Do not crush, break, or chew it. Do not lick or wet the tablet before placing it in your mouth. Do not give this medicine through a feeding tube. · This medicine should come with a Medication Guide. Ask your pharmacist for a copy if you do not have one. · Missed dose: If you miss a dose of this medicine, skip the missed dose and go back to your regular dosing schedule. Do not double doses. · Store the medicine in a closed container at room temperature, away from heat, moisture, and direct light. Ask your pharmacist about the best way to dispose of medicine you do not use. Drugs and Foods to Avoid: Ask your doctor or pharmacist before using any other medicine, including over-the-counter medicines, vitamins, and herbal products. · Do not use Xartemis XR if you are using or have used an MAO inhibitor in the past 14 days. · Some medicines can affect how this medicine works. Tell your doctor if you are using any of the following: ¨ Carbamazepine, erythromycin, ketoconazole, lamotrigine, mirtazapine, naltrexone, phenytoin, propranolol, rifampin, ritonavir, tramadol, trazodone, or zidovudine ¨ Birth control pills ¨ Diuretic (water pill) ¨ Medicine to treat depression ¨ Phenothiazine medicine ¨ Triptan medicine to treat migraine headaches · Do not drink alcohol while you are using this medicine. Acetaminophen can damage your liver, and alcohol can increase this risk. Do not take acetaminophen without asking your doctor if you have 3 or more drinks of alcohol every day. · Tell your doctor if you use anything else that makes you sleepy. Some examples are allergy medicine, narcotic pain medicine, and alcohol. Tell your doctor if you are using buprenorphine, butorphanol, nalbuphine, pentazocine, a benzodiazepine, or a muscle relaxer. Warnings While Using This Medicine: · Tell your doctor if you are pregnant or breastfeeding, or if you have kidney disease, liver disease, heart disease, low blood pressure, breathing problems or lung disease (such as asthma, COPD), thyroid problems, [place] disease, pancreas or gallbladder problems, prostate problems, trouble urinating, or a stomach problems, or a history of head injury or brain damage, seizures, or alcohol or drug abuse. Tell your doctor if you are allergic to codeine. · This medicine may cause the following problems: 
¨ High risk of overdose, which can lead to death ¨ Respiratory depression (serious breathing problem that can be life-threatening) ¨ Liver problems ¨ Serious skin reactions ¨ Serotonin syndrome (when used with certain medicines) · This medicine may make you dizzy or drowsy. Do not drive or do anything that could be dangerous until you know how this medicine affects you. Sit or lie down if you feel dizzy. Stand up carefully. · This medicine contains acetaminophen. Read the labels of all other medicines you are using to see if they also contain acetaminophen, or ask your doctor or pharmacist. [place] not use more than 4 grams (4,000 milligrams) total of acetaminophen in one day. · This medicine can be habit-forming. Do not use more than your prescribed dose. Call your doctor if you think your medicine is not working. · Do not stop using this medicine suddenly. Your doctor will need to slowly decrease your dose before you stop it completely. · This medicine could cause infertility. Talk with your doctor before using this medicine if you plan to have children. · This medicine may cause constipation, especially with long-term use. Ask your doctor if you should use a laxative to prevent and treat constipation. · Keep all medicine out of the reach of children. Never share your medicine with anyone. Possible Side Effects While Using This Medicine:  
Call your doctor right away if you notice any of these side effects: · Allergic reaction: Itching or hives, swelling in your face or hands, swelling or tingling in your mouth or throat, chest tightness, trouble breathing · Anxiety, restlessness, fast heartbeat, fever, muscle spasms, twitching, diarrhea, seeing or hearing things that are not there · Blistering, peeling, red skin rash · Blue lips, fingernails, or skin · Dark urine or pale stools, loss of appetite, stomach pain, yellow skin or eyes · Extreme weakness, shallow breathing, uneven heartbeat, seizures, sweating, or cold or clammy skin · Severe confusion, lightheadedness, dizziness, or fainting · Severe constipation, nausea, or vomiting · Trouble breathing or slow breathing If you notice these less serious side effects, talk with your doctor:  
· Headache · Mild constipation, nausea, or vomiting · Mild sleepiness or drowsiness If you notice other side effects that you think are caused by this medicine, tell your doctor. Call your doctor for medical advice about side effects. You may report side effects to FDA at 5-083-FDA-2797 © 2017 [address] Information is for End User's use only and may not be sold, redistributed or otherwise used for commercial purposes. The above information is an  only. It is not intended as medical advice for individual conditions or treatments. Talk to your doctor, nurse or pharmacist before following any medical regimen to see if it is safe and effective for you. Please provide this summary of care documentation to your next provider. Signatures-by signing, you are acknowledging that this After Visit Summary has been reviewed with you and you have received a copy. Patient Signature:  ____________________________________________________________ Date:  ____________________________________________________________  
  
Deric Armstrong Provider Signature:  ____________________________________________________________ Date:  ____________________________________________________________

## 2018-08-29 NOTE — IP AVS SNAPSHOT
[address] Patient: Sameera Talavera MRN: [id number] EFQ:4/3/0179 A check josefina indicates which time of day the medication should be taken. My Medications START taking these medications Instructions Each Dose to Equal  
 Morning Noon Evening Bedtime  
 oxyCODONE-acetaminophen  mg per tablet Commonly known as:  PERCOCET 10 Your last dose was: Your next dose is: Take 1-2 Tabs by mouth every four (4) hours as needed. Max Daily Amount: 12 Tabs. 1-2 Tab CONTINUE taking these medications Instructions Each Dose to Equal  
 Morning Noon Evening Bedtime ABILIFY 30 mg tablet Generic drug:  ARIPiprazole Your last dose was: Your next dose is:    
   
   
 daily. * albuterol 90 mcg/actuation inhaler Commonly known as:  VENTOLIN HFA Your last dose was: Your next dose is: Take 2 Puffs by inhalation every six (6) hours as needed for Wheezing. 2 Puff * PROAIR RESPICLICK 90 mcg/actuation Aepb Generic drug:  albuterol sulfate Your last dose was: Your next dose is:    
   
   
 INL 2 PFS PO Q 6 H PRF WHZ  
     
   
   
   
  
 diazePAM 10 mg tablet Commonly known as:  VALIUM Your last dose was: Your next dose is: Take 10 mg by mouth every six (6) hours as needed. 10 mg  
    
   
   
   
  
 fluticasone-vilanterol 100-25 mcg/dose inhaler Commonly known as:  BREO ELLIPTA Your last dose was: Your next dose is:    
   
   
 INHALE ONE PUFF BY MOUTH DAILY  
     
   
   
   
  
 gabapentin 600 mg tablet Commonly known as:  NEURONTIN Your last dose was: Your next dose is: Take  by mouth two (2) times a day. glucose blood VI test strips strip Commonly known as:  blood glucose test  
   
Your last dose was: Your next dose is:    
   
   
 True resultTest blood glucose daily  
     
   
   
   
  
 hydroCHLOROthiazide 25 mg tablet Commonly known as:  HYDRODIURIL Your last dose was: Your next dose is: TAKE 1 TABLET BY MOUTH DAILY INCRUSE ELLIPTA 62.5 mcg/actuation inhaler Generic drug:  umeclidinium Your last dose was: Your next dose is: INL 1 PUFF PO QD * Lancets Misc Your last dose was: Your next dose is:    
   
   
 True results lancets Test glucose daily * TRUEPLUS LANCETS 30 gauge Misc Generic drug:  lancets Your last dose was: Your next dose is:    
   
   
 TEST GLUCOSE ONCE  DAILY LEXAPRO 20 mg tablet Generic drug:  escitalopram oxalate Your last dose was: Your next dose is: Take 20 mg by mouth daily. 20 mg  
    
   
   
   
  
 lisinopril 5 mg tablet Commonly known as:  Conner Pettit Your last dose was: Your next dose is: Take 1 Tab by mouth daily. 5 mg  
    
   
   
   
  
 montelukast 10 mg tablet Commonly known as:  SINGULAIR Your last dose was: Your next dose is: TAKE 1 TABLET BY MOUTH DAILY  
     
   
   
   
  
 NUCYNTA 100 mg tablet Generic drug:  tapentadol Your last dose was: Your next dose is: Take 100 mg by mouth every six (6) hours as needed. 100 mg  
    
   
   
   
  
 omeprazole 20 mg capsule Commonly known as:  PRILOSEC Your last dose was: Your next dose is: Take 20 mg by mouth daily. 20 mg  
    
   
   
   
  
 potassium chloride 10 mEq tablet Commonly known as:  KLOR-CON Your last dose was:     
   
Your next dose is:    
   
   
 TK 1 T PO D  
     
   
   
   
  
 SITagliptin-metFORMIN  mg per tablet Commonly known as:  Emmanuel Abrams Your last dose was: Your next dose is: Take 1 Tab by mouth two (2) times daily (with meals). 1 Tab * Notice: This list has 4 medication(s) that are the same as other medications prescribed for you. Read the directions carefully, and ask your doctor or other care provider to review them with you. Where to Get Your Medications Information on where to get these meds will be given to you by the nurse or doctor. ! Ask your nurse or doctor about these medications  
  oxyCODONE-acetaminophen  mg per tablet

## 2018-08-29 NOTE — ANESTHESIA PREPROCEDURE EVALUATION
Anesthetic History No history of anesthetic complications Review of Systems / Medical History Patient summary reviewed and pertinent labs reviewed Pulmonary Asthma : well controlled Neuro/Psych Psychiatric history Cardiovascular Hypertension Exercise tolerance: >4 METS 
  
GI/Hepatic/Renal 
  
 
 
 
Liver disease Comments: Hep C treated Endo/Other Diabetes: type 2 Other Findings Comments: Documentation of current medication Current medications obtained, documented and obtained? YES Risk Factors for Postoperative nausea/vomiting: 
     History of postoperative nausea/vomiting? NO Female? YES Motion sickness? NO Intended opioid administration for postoperative analgesia? YES Smoking Abstinence: 
Current Smoker? NO Elective Surgery? YES Seen preoperatively by anesthesiologist or proxy prior to day of surgery? YES Pt abstained from smoking 24 hours prior to anesthesia? N/A Preventive care/screening for High Blood Pressure: 
Aged 18 years and older: YES Screened for high blood pressure: YES Patients with high blood pressure referred to primary care provider 
 for BP management: YES Physical Exam 
 
Airway Mallampati: II 
TM Distance: 4 - 6 cm Neck ROM: normal range of motion Mouth opening: Normal 
 
 Cardiovascular Regular rate and rhythm,  S1 and S2 normal,  no murmur, click, rub, or gallop Rhythm: regular Rate: normal 
 
 
 
 Dental 
 
Dentition: Edentulous Pulmonary Breath sounds clear to auscultation Abdominal 
GI exam deferred Other Findings Anesthetic Plan ASA: 3 Anesthesia type: general 
 
 
 
 
Induction: Intravenous Anesthetic plan and risks discussed with: Patient

## 2018-08-29 NOTE — OP NOTES
BRIEF OPERATIVE NOTE    Date of Procedure: 8/29/2018     Preoperative Diagnosis: pa  s32.009k    Postoperative Diagnosis: pa  s32.009k      Procedure: Procedure(s):  remove globus and explore fusion l4-s1,decompression l3-4 and left l4-s1, TRANSFORAMINAL  INTERBODY FUSION l5-s1,  polaris l4-s1,iliac bolts,posterior spine fusion l4-s1,bmp    Surgeon(s) and Role:     * Asuncion Wild MD - Primary     * Latia Cho MD - Assisting    Anesthesia: General     Findings: PA l5-s1, stenosis l3-4 and left l4-s1     Estimated Blood Loss: 250  Replaced0      [id number]        Urine50    Specimens: * No specimens in log *     Tubes/Drains: None    Needle/sponge count:  Correct    Complications: 0\    Plan    Up at [facility]  Dc scott in am  PT ambulate with tlso  Home 2-3 days with tlso andpercocet  rto 1 month    Dr. Jackie Hernandez to follow for med

## 2018-08-29 NOTE — PROGRESS NOTES
TRANSFER - IN REPORT: 
 
Verbal report received from [facility] on [facility]  being received from PACU for routine post - op Report consisted of patients Situation, Background, Assessment and  
Recommendations(SBAR). Information from the following report(s) SBAR and Procedure Summary was reviewed with the receiving nurse. Opportunity for questions and clarification was provided. 1355 Received pt via bed awake but drowsy in NAD. Wearing O2 at 2L via n/c. Family at [facility]. Nicholson draining pale yellow urine. Oriented to call bell, phone and IS with pt giving return demonstration.

## 2018-08-29 NOTE — PROGRESS NOTES
conducted a pre-surgery visit with Aye Joe, who is a 48 y.o.,female. The  provided the following Interventions: 
Initiated a relationship of care and support. Offered prayer and assurance of continued prayers on patient's behalf. Plan: 
Chaplains will continue to follow and will provide pastoral care on an as needed/requested basis.  recommends bedside caregivers page  on duty if patient shows signs of acute spiritual or emotional distress. Kike 3 Board Certified [employer] Spiritual Care  
[phone]

## 2018-08-30 LAB
APPEARANCE UR: CLEAR
BACTERIA URNS QL MICRO: ABNORMAL /HPF
BASOPHILS # BLD: 0 K/UL (ref 0–0.1)
BASOPHILS NFR BLD: 0 % (ref 0–2)
BILIRUB UR QL: NEGATIVE
COLOR UR: YELLOW
DIFFERENTIAL METHOD BLD: ABNORMAL
EOSINOPHIL # BLD: 0 K/UL (ref 0–0.4)
EOSINOPHIL NFR BLD: 0 % (ref 0–5)
EPITH CASTS URNS QL MICRO: ABNORMAL /LPF (ref 0–5)
ERYTHROCYTE [DISTWIDTH] IN BLOOD BY AUTOMATED COUNT: 14.7 % (ref 11.6–14.5)
GLUCOSE BLD STRIP.AUTO-MCNC: 145 MG/DL (ref 70–110)
GLUCOSE BLD STRIP.AUTO-MCNC: 158 MG/DL (ref 70–110)
GLUCOSE BLD STRIP.AUTO-MCNC: 211 MG/DL (ref 70–110)
GLUCOSE UR STRIP.AUTO-MCNC: 250 MG/DL
HCT VFR BLD AUTO: 25.7 % (ref 35–45)
HCT VFR BLD AUTO: 29.8 % (ref 35–45)
HGB BLD-MCNC: 8.1 G/DL (ref 12–16)
HGB BLD-MCNC: 9.4 G/DL (ref 12–16)
HGB UR QL STRIP: NEGATIVE
KETONES UR QL STRIP.AUTO: NEGATIVE MG/DL
LEUKOCYTE ESTERASE UR QL STRIP.AUTO: ABNORMAL
LYMPHOCYTES # BLD: 2.6 K/UL (ref 0.9–3.6)
LYMPHOCYTES NFR BLD: 28 % (ref 21–52)
MCH RBC QN AUTO: 27 PG (ref 24–34)
MCHC RBC AUTO-ENTMCNC: 31.5 G/DL (ref 31–37)
MCV RBC AUTO: 85.6 FL (ref 74–97)
MONOCYTES # BLD: 0.5 K/UL (ref 0.05–1.2)
MONOCYTES NFR BLD: 6 % (ref 3–10)
NEUTS SEG # BLD: 6 K/UL (ref 1.8–8)
NEUTS SEG NFR BLD: 66 % (ref 40–73)
NITRITE UR QL STRIP.AUTO: NEGATIVE
PH UR STRIP: 5.5 [PH] (ref 5–8)
PLATELET # BLD AUTO: 122 K/UL (ref 135–420)
PMV BLD AUTO: 9.9 FL (ref 9.2–11.8)
PROT UR STRIP-MCNC: NEGATIVE MG/DL
RBC # BLD AUTO: 3.48 M/UL (ref 4.2–5.3)
RBC #/AREA URNS HPF: ABNORMAL /HPF (ref 0–5)
SP GR UR REFRACTOMETRY: 1.02 (ref 1–1.03)
UROBILINOGEN UR QL STRIP.AUTO: 0.2 EU/DL (ref 0.2–1)
WBC # BLD AUTO: 9.1 K/UL (ref 4.6–13.2)
WBC URNS QL MICRO: ABNORMAL /HPF (ref 0–4)

## 2018-08-30 PROCEDURE — 51798 US URINE CAPACITY MEASURE: CPT

## 2018-08-30 PROCEDURE — 77030011943

## 2018-08-30 PROCEDURE — 65270000029 HC RM PRIVATE

## 2018-08-30 PROCEDURE — 97116 GAIT TRAINING THERAPY: CPT

## 2018-08-30 PROCEDURE — 81001 URINALYSIS AUTO W/SCOPE: CPT | Performed by: INTERNAL MEDICINE

## 2018-08-30 PROCEDURE — 77010033678 HC OXYGEN DAILY

## 2018-08-30 PROCEDURE — 36415 COLL VENOUS BLD VENIPUNCTURE: CPT | Performed by: INTERNAL MEDICINE

## 2018-08-30 PROCEDURE — 85025 COMPLETE CBC W/AUTO DIFF WBC: CPT | Performed by: INTERNAL MEDICINE

## 2018-08-30 PROCEDURE — P9016 RBC LEUKOCYTES REDUCED: HCPCS | Performed by: NURSE ANESTHETIST, CERTIFIED REGISTERED

## 2018-08-30 PROCEDURE — 82962 GLUCOSE BLOOD TEST: CPT

## 2018-08-30 PROCEDURE — 85018 HEMOGLOBIN: CPT | Performed by: INTERNAL MEDICINE

## 2018-08-30 PROCEDURE — 87086 URINE CULTURE/COLONY COUNT: CPT | Performed by: INTERNAL MEDICINE

## 2018-08-30 PROCEDURE — 97112 NEUROMUSCULAR REEDUCATION: CPT

## 2018-08-30 PROCEDURE — 74011000258 HC RX REV CODE- 258: Performed by: INTERNAL MEDICINE

## 2018-08-30 PROCEDURE — 74011250636 HC RX REV CODE- 250/636: Performed by: INTERNAL MEDICINE

## 2018-08-30 PROCEDURE — 97162 PT EVAL MOD COMPLEX 30 MIN: CPT

## 2018-08-30 PROCEDURE — 36430 TRANSFUSION BLD/BLD COMPNT: CPT

## 2018-08-30 PROCEDURE — 74011000258 HC RX REV CODE- 258: Performed by: ORTHOPAEDIC SURGERY

## 2018-08-30 PROCEDURE — 74011250637 HC RX REV CODE- 250/637: Performed by: INTERNAL MEDICINE

## 2018-08-30 PROCEDURE — 74011250636 HC RX REV CODE- 250/636: Performed by: ORTHOPAEDIC SURGERY

## 2018-08-30 RX ORDER — INSULIN LISPRO 100 [IU]/ML
INJECTION, SOLUTION INTRAVENOUS; SUBCUTANEOUS
Status: DISCONTINUED | OUTPATIENT
Start: 2018-08-30 | End: 2018-09-01 | Stop reason: HOSPADM

## 2018-08-30 RX ORDER — SODIUM CHLORIDE 9 MG/ML
500 INJECTION, SOLUTION INTRAVENOUS ONCE
Status: COMPLETED | OUTPATIENT
Start: 2018-08-30 | End: 2018-08-30

## 2018-08-30 RX ORDER — SODIUM CHLORIDE 9 MG/ML
250 INJECTION, SOLUTION INTRAVENOUS AS NEEDED
Status: DISCONTINUED | OUTPATIENT
Start: 2018-08-30 | End: 2018-09-01 | Stop reason: HOSPADM

## 2018-08-30 RX ORDER — SODIUM CHLORIDE 9 MG/ML
125 INJECTION, SOLUTION INTRAVENOUS CONTINUOUS
Status: DISCONTINUED | OUTPATIENT
Start: 2018-08-30 | End: 2018-09-01 | Stop reason: HOSPADM

## 2018-08-30 RX ORDER — DEXTROSE 50 % IN WATER (D50W) INTRAVENOUS SYRINGE
25-50 AS NEEDED
Status: DISCONTINUED | OUTPATIENT
Start: 2018-08-30 | End: 2018-09-01 | Stop reason: HOSPADM

## 2018-08-30 RX ADMIN — Medication 10 ML: at 17:03

## 2018-08-30 RX ADMIN — CEFTRIAXONE 1 G: 1 INJECTION, POWDER, FOR SOLUTION INTRAMUSCULAR; INTRAVENOUS at 16:56

## 2018-08-30 RX ADMIN — DOCUSATE SODIUM 100 MG: 100 CAPSULE, LIQUID FILLED ORAL at 09:17

## 2018-08-30 RX ADMIN — GABAPENTIN 300 MG: 300 CAPSULE ORAL at 09:17

## 2018-08-30 RX ADMIN — SODIUM CHLORIDE AND POTASSIUM CHLORIDE: 9; 1.49 INJECTION, SOLUTION INTRAVENOUS at 13:42

## 2018-08-30 RX ADMIN — Medication 10 ML: at 05:59

## 2018-08-30 RX ADMIN — SODIUM CHLORIDE AND POTASSIUM CHLORIDE: 9; 1.49 INJECTION, SOLUTION INTRAVENOUS at 18:41

## 2018-08-30 RX ADMIN — Medication 10 ML: at 21:12

## 2018-08-30 RX ADMIN — SODIUM CHLORIDE AND POTASSIUM CHLORIDE: 9; 1.49 INJECTION, SOLUTION INTRAVENOUS at 01:34

## 2018-08-30 RX ADMIN — MONTELUKAST SODIUM 10 MG: 10 TABLET, FILM COATED ORAL at 21:09

## 2018-08-30 RX ADMIN — CEFAZOLIN 1 G: 1 INJECTION, POWDER, FOR SOLUTION INTRAMUSCULAR; INTRAVENOUS at 09:14

## 2018-08-30 RX ADMIN — DOCUSATE SODIUM 100 MG: 100 CAPSULE, LIQUID FILLED ORAL at 18:16

## 2018-08-30 RX ADMIN — OXYCODONE HYDROCHLORIDE AND ACETAMINOPHEN 2 TABLET: 10; 325 TABLET ORAL at 17:03

## 2018-08-30 RX ADMIN — MORPHINE SULFATE 4 MG: 4 INJECTION, SOLUTION INTRAMUSCULAR; INTRAVENOUS at 09:48

## 2018-08-30 RX ADMIN — ARIPIPRAZOLE 30 MG: 10 TABLET ORAL at 09:17

## 2018-08-30 RX ADMIN — SODIUM CHLORIDE 500 ML: 900 INJECTION, SOLUTION INTRAVENOUS at 18:16

## 2018-08-30 RX ADMIN — OXYCODONE HYDROCHLORIDE AND ACETAMINOPHEN 1 TABLET: 10; 325 TABLET ORAL at 22:08

## 2018-08-30 RX ADMIN — MORPHINE SULFATE 4 MG: 4 INJECTION, SOLUTION INTRAMUSCULAR; INTRAVENOUS at 04:23

## 2018-08-30 RX ADMIN — SODIUM CHLORIDE 100 ML/HR: 900 INJECTION, SOLUTION INTRAVENOUS at 21:09

## 2018-08-30 RX ADMIN — CEFAZOLIN 1 G: 1 INJECTION, POWDER, FOR SOLUTION INTRAMUSCULAR; INTRAVENOUS at 01:25

## 2018-08-30 RX ADMIN — TIOTROPIUM BROMIDE 18 MCG: 18 CAPSULE ORAL; RESPIRATORY (INHALATION) at 09:20

## 2018-08-30 NOTE — PROGRESS NOTES
Offered the pt FOC for home care, she chose [facility]. Pt verified the contact information on the face sheet is correct. DC pending. Delivered the pt a rolling walker from the [facility]. Faxed the referral and signed delivery ticket to [facility] for processing. Care Management Interventions PCP Verified by CM: Yes 
Palliative Care Criteria Met (RRAT>21 & CHF Dx)?: No 
Transition of Care Consult (CM Consult): Home Health [address]: Yes Discharge Durable Medical Equipment: Yes (r/w) Physical Therapy Consult: Yes Current Support Network: Other (son and grandchildren) Confirm Follow Up Transport: Family Plan discussed with Pt/Family/Caregiver: Yes Freedom of Choice Offered: Yes Discharge Location Discharge Placement: Home with home health

## 2018-08-30 NOTE — PROGRESS NOTES
Problem: Falls - Risk of 
Goal: *Absence of Falls Document Easter Getting Fall Risk and appropriate interventions in the flowsheet. Outcome: Progressing Towards Goal 
Fall Risk Interventions: 
Mobility Interventions: Communicate number of staff needed for ambulation/transfer Medication Interventions: Assess postural VS orthostatic hypotension Elimination Interventions: Call light in reach Problem: Pressure Injury - Risk of 
Goal: *Prevention of pressure injury Document Daniel Scale and appropriate interventions in the flowsheet. Outcome: Progressing Towards Goal 
Pressure Injury Interventions: 
Sensory Interventions: Assess changes in LOC, Check visual cues for pain Activity Interventions: Pressure redistribution bed/mattress(bed type), PT/OT evaluation Mobility Interventions: HOB 30 degrees or less, Pressure redistribution bed/mattress (bed type) Nutrition Interventions: Document food/fluid/supplement intake

## 2018-08-30 NOTE — ROUTINE PROCESS
Assumed care of patient at [place] report received from Maksim Goyal RN. Alert & oriented, denies nausea. Pt states pain rate of 4/10. Posterior dressing D/I. Call bell within reach. Daughter at the bedside. All safety precautions in place - safety maintained. 2018 - Temp 98.3   /72 RR 16 O2 sat of 98%. 2055 - Assisted pt to [facility], voiding & back to bed, well tolerated. 2123 - Started Blood transfusion, Temp 99.0 
2200 - on going blood transfusion, pt denies cp, no sob, no nausea. well tolerated. 2208 -  Medicated pt for pain. 2330 - Pt resting quietly and no distress noted. 0101 - Blood transfusions completed without s/s of transfusions reaction suspected. 0300 - Pt sleeping. 0730 - Bedside and Verbal shift change report given to Abran Nissen ,RN (oncoming nurse) by Stevenson Myrick RN BSN (offgoing nurse). Report given with SBAR, Kardex, Intake/Output, MAR and Recent Results.

## 2018-08-30 NOTE — PROGRESS NOTES
1345: PM PT attempt. Patient refusing. Will follow up.  
1445: 2nd PT attempt. Multiple family members present. Will follow up. Thank you, Vicente Cárdenas PT, DPT Office Extension: [phone] Pager: [phone]

## 2018-08-30 NOTE — PROGRESS NOTES
PROGRESS NOTE Patient: Zuleika Renee MRN: [id number]  CSN: [id number] YOB: 1965  Age: 48 y.o. Sex: female DOA: 8/29/2018 LOS:  LOS: 1 day Diagnosis: pa  s32.009k   
   
PROCEDURE:  
Remove globus and explore fusion l4-s1,decompression l3-4 and left l4-s1, TRANSFORAMINAL INTERBODY FUSION l5-s1,polaris l4-s1,iliac bolts,posterior spine fusion l4-s1,bmp HPI:  
Pain controlled; scott D/C, unable to void by the bedside- Bladder scan 338 cc No N/V. No abdominal pain ACTIVE MEDICAL PROBLEMS/PMH/PSH 
T2 DM dxd 2008; No SMBS;  
No ED or  Hospitalization for Hyper/hypoglycemia HTN Asthma Bipolar Asthma Avascular necrosis of bone of right hip ([address] Chronic pain Depression Diabetes ([address] GERD (gastroesophageal reflux disease) Hepatitis C Hypertension Liver disease Nicotine vapor product user Psychotic disorder Sickle cell trait (HCC)        
Decreased exercise tolerance    SOME SOB WITH 2 FLIGHTS OF STEPS  
         
         
     NIDDM Arthropathy, unspecified, site unspecified    ARTHRITIS-BACK AREA Headache(784.0) [de-identified]  
         
Piercing    EARS Tattoo    RIGHT BREAST AREA,; RIGHT HAND 3 FINGERS Bipolar disorder (HCC)        
Leg cramps        
Hepatitis    HEPATITIS C DX'D 1990'S Mass of thigh    1.5 cm cystic mass lesion right ant/med Breast mass    right 2.0 cystic mass lesion UOQ  
  
  
PSH: 
     
Past Surgical History:  
Procedure Laterality Date  HX CHOLECYSTECTOMY      
 HX HEENT      
  EYE SX  
 HX ORTHOPAEDIC   2008  
  l4-l5-s1  HX ORTHOPAEDIC   3/2015  
  right foot decompression peroneal nerve Hospital Problems  Date Reviewed: 8/6/2018 Codes Class Noted POA Pseudoarthrosis of lumbar spine ICD-10-CM: S32.009K ICD-9-CM: 733.82  8/29/2018 Unknown Pseudarthrosis following spinal fusion ICD-10-CM: M96.0 ICD-9-CM: 996.49, V45.4  8/29/2018 Unknown Patient Vitals for the past 24 hrs: Temp Pulse Resp BP SpO2  
08/30/18 1525 (!) 101 °F (38.3 °C) (!) 115 17 102/71 100 % 08/30/18 1143 97.3 °F (36.3 °C) 89 16 97/67 99 % 08/30/18 0758 99.5 °F (37.5 °C) 94 18 103/70 98 % 08/30/18 0422 98.1 °F (36.7 °C) 87 16 103/67 100 % 08/29/18 2315 98.3 °F (36.8 °C) 88 16 99/66 99 % 08/29/18 2001 98.3 °F (36.8 °C) 93 16 94/63 96 % ROS: 
Constitutional: No chills . No sweats. No headache. No malaise . Good appetite   
Eyes: no redness. No pain. OD blurry vision (cataracts) Cardiac: no CP. BLAKE. No orthopnea. No PND. No leg edema . No palpitation Respiratory: no cough . No SOB. No wheezing . No hemoptysis GI: no nausea . No vomiting. No reflux. No abdominal pain. Normal BM . No black stool . No blood in the stool : hematuria with scott in place. No vaginal bleeding M/S:+ back pain Neuro: numbness Skin: no rash. No broken skin. PHYSICAL EXAM: 
GENERAL:  
LUNGS: CTA. BS Normal Bilaterally HEART: RRR   Normal S1 S2. No Significant murmur. No S3 S4 ABDOMEN: Soft. Normal BS. No tenderness. LE: No edema. PSYCHIATRIC   
Mood: Not Depressed. Not  Anxious    
Affect: appropriate                
                                  
 
Intake/Output Summary (Last 24 hours) at 08/30/18 1547 Last data filed at 08/30/18 3717 Gross per 24 hour Intake          2164.17 ml Output              675 ml Net          1489.17 ml  
 
 
Current Shift:    
 
Last three shifts:  08/28 1901 - 08/30 0700 In: 4164.2 [P.O.:480; I.V.:3684.2] Out: 1055 [Urine:805] Recent Results (from the past 24 hour(s)) GLUCOSE, POC Collection Time: 08/29/18  4:31 PM  
Result Value Ref Range Glucose (POC) 150 (H) 70 - 110 mg/dL CBC WITH AUTOMATED DIFF Collection Time: 08/30/18  6:42 AM  
Result Value Ref Range WBC 9.1 4.6 - 13.2 K/uL  
 RBC 3.48 (L) 4.20 - 5.30 M/uL HGB 9.4 (L) 12.0 - 16.0 g/dL HCT 29.8 (L) 35.0 - 45.0 %  MCV 85.6 74.0 - 97.0 FL  
 MCH 27.0 24.0 - 34.0 PG  
 MCHC 31.5 31.0 - 37.0 g/dL  
 RDW 14.7 (H) 11.6 - 14.5 % PLATELET 312 (L) 310 - 420 K/uL MPV 9.9 9.2 - 11.8 FL  
 NEUTROPHILS 66 40 - 73 % LYMPHOCYTES 28 21 - 52 % MONOCYTES 6 3 - 10 % EOSINOPHILS 0 0 - 5 % BASOPHILS 0 0 - 2 %  
 ABS. NEUTROPHILS 6.0 1.8 - 8.0 K/UL  
 ABS. LYMPHOCYTES 2.6 0.9 - 3.6 K/UL  
 ABS. MONOCYTES 0.5 0.05 - 1.2 K/UL  
 ABS. EOSINOPHILS 0.0 0.0 - 0.4 K/UL  
 ABS. BASOPHILS 0.0 0.0 - 0.1 K/UL  
 DF AUTOMATED    
GLUCOSE, POC Collection Time: 08/30/18 11:50 AM  
Result Value Ref Range Glucose (POC) 158 (H) 70 - 110 mg/dL Results for Divine Adams (MRN [id number]) as of 8/30/2018 15:44 Ref. Range 7/30/2018 11:11 8/30/2018 06:42 WBC Latest Ref Range: 4.6 - 13.2 K/uL 8.5 9.1 RBC Latest Ref Range: 4.20 - 5.30 M/uL 4.77 3.48 (L) HGB Latest Ref Range: 12.0 - 16.0 g/dL 12.9 9.4 (L) HCT Latest Ref Range: 35.0 - 45.0 % 40.5 29.8 (L) MCV Latest Ref Range: 74.0 - 97.0 FL 84.9 85.6 MCH Latest Ref Range: 24.0 - 34.0 PG 27.0 27.0 MCHC Latest Ref Range: 31.0 - 37.0 g/dL 31.9 31.5 RDW Latest Ref Range: 11.6 - 14.5 % 14.2 14.7 (H) PLATELET Latest Ref Range: 135 - 420 K/uL 161 122 (L) Lab Results Component Value Date/Time Glucose 78 07/30/2018 11:11 AM  
 Glucose 98 05/15/2018 09:39 AM  
 Glucose 129 (H) 02/13/2018 12:00 AM  
 Glucose 77 11/15/2017 09:35 AM  
 Glucose 97 08/14/2017 10:11 AM  
  
 
ASSESSMENT Urinary retention Fever Post op anemia HTN 
T2DM followed by Ernesto Leal Asthma/COPD Bipolar PLAN 
SC 
[facility] Empiric Rocephin 1 albino Paul MD 
8/30/2018, 3:47 PM

## 2018-08-30 NOTE — PROGRESS NOTES
Care Management Interventions PCP Verified by CM: Yes 
Palliative Care Criteria Met (RRAT>21 & CHF Dx)?: No 
Transition of Care Consult (CM Consult): Discharge Planning Discharge Durable Medical Equipment: No 
Physical Therapy Consult: Yes Current Support Network: Other (son and grandchildren) Confirm Follow Up Transport: Family Plan discussed with Pt/Family/Caregiver: Yes Discharge Location Discharge Placement: Home Reason for Admission:   TRANSFORAMINAL  INTERBODY FUSION l5-s1,polaris l4-s1,iliac bolts,posterior spine fusion l4-s1,bmp RRAT Score:         yellow Plan for utilizing home health:  Yes PT Likelihood of Readmission:  Yellow Spoke with patient in room, she stated that she lives with her son and grandchildren, She stated that she has been up with PT today and feels comfort ambulating with a walker. She stated that she will need a walker upon discharge. She verified her demographics, insurance and PCP as correct on the facesheet. Patient has designated ___daughter_____________________ to participate in his/her discharge plan and to receive any needed information. Johnie Haji [phone]. SHe is independent with ADL's and has no DME's at this time. SHe plans to return home upon discharge and transportation will be provided by family . Patient will need rolling walker and home health PT upon discharge.

## 2018-08-30 NOTE — OP NOTES
[address] REPORT    Ruy Cha  MR#: [id number]  : 1965  ACCOUNT #: [de-identified]   DATE OF SERVICE: 2018    PREOPERATIVE DIAGNOSES:    1. Pseudarthrosis L5-S1, status post decompression and fusion with Globus instrumentation L4-S1.  2.  Lumbar stenosis L3-4 and on the left L4-S1. POSTOPERATIVE DIAGNOSES:  1. Pseudarthrosis L5-S1, status post decompression and fusion with Globus instrumentation L4-S1.  2.  Lumbar stenosis L3-4 and on the left L4-S1. PROCEDURES PERFORMED:  1. Removal of instrumentation and exploration of fusion, L4-S1.  2.  Lumbar decompression L3-S1, bilateral exploration and decompression of the L3, L4, L5 and S1 nerve roots with foraminotomy and partial facetectomy. 3.  Polaris 5.5 mm open variable angle instrumentation L4 to S1 with bilateral .  4.  Bilateral posterolateral spinal fusion L5-S1 with local bone graft, bone bank bone graft BMP (bone morphogenetic protein) and AGF (autologous growth factors). 5.  Transforaminal lumbar interbody fusion, L5-S1 with local bone graft, bone bank bone graft and autologous growth factor. 6.  Placement of intervertebral body fusion device, that being a 15 mm Zyston cage L5-S1. SURGEON:  Miguelangel Santana MD    ASSISTANT:  Ahmet Wing. ANESTHESIA:  General.    COMPLICATIONS:      SPECIMENS REMOVED:      IMPLANTS:  3FINDINGS:  The patient had Globus instrumentation L4-S1. There was fatigue failure of the left S1 screw. There was bilateral pseudoarthrosis L5-S1. Patient had central subarticular stenosis L3-4. No significant stenosis on the left L4-5, 5-1. No stenosis on the right L4-5, 5-1. PROCEDURE:  Under general anesthesia,  the patient rolled in prone position on the Bactest table, hips extended, knees flexed,  peripheral nerves padded. Back prepped and draped in sterile fashion.     Previous incision was opened and muscles subperiosteally exposed the lateral facet capsules at L3-L4 and the instrumentation distally. Instrumentation removed bilaterally. Fusion was explored with a curet and  with findings as above. Margin of previous decompression on the left L4-S1 sharply delineated with a curet and then the decompression line to the L4, 5 and S1 pedicles and the respective L4, 5 and S1 nerve roots decompressed bilaterally around the pedicles into the foramen, confirmed foraminotomy and partial facetectomy. In similar fashion, right hand nerve roots visualized and decompressed L4-S1. Ball probe placed around the pedicles bilaterally into the foramen without nerve root compromise. Decompression performed at L3-4 by removing the inferior 2/3 of the L3 lamina and underlying ligamentum flavum. Overhanging L4 screw articular facet was resected. Decompression widened to the L3 pedicles. Ball probe placed around the L3 pedicles into the foramen without nerve root compromise. The left S1 nerve root and dural tube mobilized medially exposing the underlying left L5-S1 disk space. The disk was excised in total with osteotomes, curets, grabbers and interbody shapers in preparation for interbody fusion. Distal limit of failed left S1 screw was then removed with special screwdriver. Beginning first on the left-hand side, posterolateral elements were decorticated L5-S1 followed by placement of BMP pledget and bone graft. Polaris 5.5 mm open variable angle instrumentation screws were placed. Montage was used to help secure the S1 screw. An 8.5 mm diameter screw was used. In similar fashion the right-hand side decorticated. BMP placed followed by bone graft and screws placed. On the right hand side, Montage again used at the S1 screw site and a 10 mm screw was used. Iliac bolts were then placed bilaterally through separate subfascial incisions. Entrance point was made at the PSIS, a portion of which was resected to allow countersinking of the screws.   Lateral connectors then tunneled ventral to the paraspinal muscles, secured in the iliac bolts with set screws. Rods placed bilaterally followed by set screws, L5-S1 distracted. Set screws tightened and torqued to 120 inch pounds. Transforaminal lumbar interbody fusion performed by packing the anterior portion of the L5-S1 disk space with bone graft material and gently tamping anteriorly an intervertebral body fusion device,  that being a 15 mm Zyston cage with filled  bone graft material and impacted into the disk space and turned to a transverse position. Final radiographs obtained were satisfactory. Additional pledget of BMP placed bilaterally L5-S1 followed by placement of additional bone graft about the rods L5-S1. Final radiographs obtained. Instrumentation was intact in a satisfactory position. The wound then closed in layers with powdered vancomycin in deep subcutaneous tissue. Wound dressed. Patient awakened and taken to recovery room in satisfactory condition without complication. ESTIMATED BLOOD LOSS:  250 mL. FLUIDS:  The patient received 1400 mL crystalloid fluid. 50 mL urine output. URINE OUTPUT:  50 mL. No specimen; no tubes or drains. COUNTS:  Needle, sponge count correct without complication. At the time of dictation, patient not awakened sufficiently to test motor sensation. MD ANITHA Gutierrez / [place]  D: 08/29/2018 11:56     T: 08/30/2018 00:22  JOB #: [id number]  CC: RIMA Melgoza III, MD  CC: Tawanda Carcamo MD  [facility] [place] [address]  CC: Larissa Warren, [address] [place] [address]

## 2018-08-30 NOTE — ROUTINE PROCESS
Assumed care of patient at 2000 report received from Pat Su RN. Received pt in bed alert & oriented with delayed response. Denies nausea. Pt states pain level is 4/10. Post op back dressing C/D/I. Nicholson catheter in placed draining yellow urine. Call bell within reach. All safety precautions in place - safety maintained. 2100 - Patient made no complaints. 2329 - Medicated pt for pain. Denies nausea. 0423 - Morphine 2 mg IV given for pain. Pt states pain rate of 10/10. 
0450 - Pt states pain rate of 6/10. 
0550 - Nciholson catheter discontinued. Ambulated pt sitting up in chair. Well tolerated. Reminded patient due to void at 1150. Pt verbalized understanding. 5377 - Bedside and Verbal shift change report given to Ellie ABARCA RN (oncoming nurse) by Leopoldo Raymond RN BSN (offgoing nurse). Report given with SBAR, Kardex, Intake/Output, MAR and Recent Results.

## 2018-08-30 NOTE — PROGRESS NOTES
Problem: Mobility Impaired (Adult and Pediatric) Goal: *Acute Goals and Plan of Care (Insert Text) Physical Therapy Goals Initiated 8/30/2018 and to be accomplished within 7 day(s) 1. Patient will move from supine to sit and sit to supine  in bed with modified independence. 2.  Patient will transfer from bed to chair and chair to bed with modified independence using the least restrictive device. 3.  Patient will perform sit to stand with modified independence. 4.  Patient will ambulate with modified independence for 150 feet with the least restrictive device. 5.  Patient will ascend/descend 10 stairs with handrail(s) with modified independence. Outcome: Progressing Towards Goal 
PHYSICAL THERAPY: Initial Assessment INPATIENT: Medicare: Hospital Day: 2 Patient: Malachi Cristina (59 y.o. female)    Date: 8/30/2018 Primary Diagnosis: pa  s32.009k Pseudoarthrosis of lumbar spine Pseudarthrosis following spinal fusion Procedure(s) (LRB): 
remove globus and explore fusion l4-s1,decompression l3-4 and left l4-s1, TRANSFORAMINAL  INTERBODY FUSION l5-s1, (Left) 
flospine l4-s1,iliac bolts,posterior spine fusion l4-s1,bmp (N/A), 1 Day Post-Op Precautions: Fall, Spinal 
PLOF: Independent ASSESSMENT : 
Patient requires between moderate assistance  and supervision/set-up for bed mobility, transfers and ambulation. Pain 10/10. RN present with IV pain meds. Educated on role of PT, lumbar precautions, role of brace, and plan of care; verbalized understanding. Brace donned prior to entry. Mod A for log roll and supine to sit. Good sitting balance 10 minutes. Min A for sit to stand. Amb with ww and supervision 25 ft. Decreased gait speed. Step to gait with shortened step length bilaterally. Returned to standing at EOB with ww; 5 minutes for washing up. Supervision for standing balance with one UE support for dynamic tasks.  Returned to seated with good eccentric control. Doffed brace with supervision. Returned to supine in bed with mod A. All needs in reach. Patient presents with deficits in: 
Bed Mobility, Transfers, Gait, Strength, Balance, Stairs and Precautions Patient will benefit from skilled intervention to address the above impairments. Patients rehabilitation potential is considered to be Good Factors which may influence rehabilitation potential include:  
[]         None noted 
[x]         Mental ability/status []         Medical condition 
[]         Home/family situation and support systems 
[]         Safety awareness [x]         Pain tolerance/management 
[]         Other: PLAN : 
Recommendations and Planned Interventions: 
[x]           Bed Mobility Training             [x]    Neuromuscular Re-Education 
[x]           Transfer Training                   []    Orthotic/Prosthetic Training 
[x]           Gait Training                          []    Modalities [x]           Therapeutic Exercises          []    Edema Management/Control 
[x]           Therapeutic Activities            [x]    Patient and Family Training/Education 
[]           Other (comment): EDUCATION:  
Education:  Patient was educated on the following topics: Bed mobility, transfers, ADLs, balance, amb, safety, exercise, role of PT, plan of care, cognition, skin integrity, vitals Barriers to Learning/Limitations: None Compensate with: visual, verbal, tactile, kinesthetic cues/model Frequency/Duration: Patient will be followed by physical therapy 1-2 times per day/4-7 days per week to address goals. Discharge Recommendations: Home Health Further Equipment Recommendations for Discharge: rolling walker SUBJECTIVE:  
Patient stated I was scared up in the chair.  OBJECTIVE DATA SUMMARY:  
 
Past Medical History:  
Diagnosis Date  Asthma  Avascular necrosis of bone of right hip ([facility] [address]  Chronic pain  Depression  Diabetes ([facility] [address]  GERD (gastroesophageal reflux disease)  Hepatitis C   
 Hypertension  Liver disease  Nicotine vapor product user  Psychotic disorder Past Surgical History:  
Procedure Laterality Date  HX CHOLECYSTECTOMY  HX HEENT    
 EYE SX  
 HX ORTHOPAEDIC  2008 l4-l5-s1  HX ORTHOPAEDIC  3/2015  
 right foot decompression peroneal nerve Eval Complexity: History: MEDIUM  Complexity : 1-2 comorbidities / personal factors will impact the outcome/ POC Exam:MEDIUM Complexity : 3 Standardized tests and measures addressing body structure, function, activity limitation and / or participation in recreation  Presentation: MEDIUM Complexity : Evolving with changing characteristics  Clinical Decision Making:Medium Complexity [facility] Standing Balance Scale 2+/5 Overall Complexity:MEDIUM 
 
G CODES:Mobility [id number] Current  CK= 40-59%   Goal  CI= 1-19%. The severity rating is based on the [employer] Balance Scale 2+/5 [address] Balance Scale 2+/5 
0: Pt performs 25% or less of standing activity (Max assist) CN, 100% impaired. 1: Pt supports self with upper extremities but requires therapist assistance. Pt performs 25-50% of effort (Mod assist) CM, 80% to <100% impaired. 1+: Pt supports self with upper extremities but requires therapist assistance. Pt performs >50% effort. (Min assist). CL, 60% to <80% impaired. 2: Pt supports self independently with both upper extremities (walker, crutches, parallel bars). CL, 60% to <80% impaired. 2+: Pt support self independently with 1 upper extremity (cane, crutch, 1 parallel bar). CK, 40% to <60% impaired. 3: Pt stands without upper extremity support for up to 30 seconds. CK, 40% to <60% impaired. 3+: Pt stands without upper extremity support for 30 seconds or greater. CJ, 20% to <40% impaired.  
4: Pt independently moves and returns center of gravity 1-2 inches in one plane. CJ, 20% to <40% impaired. 4+: Pt independently moves and returns center of gravity 1-2 inches in multiple planes. CI, 1% to <20% impaired. 5: Pt independently moves and returns center of gravity in all planes greater than 2 inches. CH, 0% impaired. Prior Level of Function/Home Situation: independent Home Situation Home Environment: Private residence # Steps to Enter: 0 One/Two Story Residence: Two story Living Alone: No 
Support Systems: Family member(s) Patient Expects to be Discharged to[de-identified] Private residence Current DME Used/Available at Home: None Critical Behavior: 
Neurologic State: Alert Orientation Level: Oriented X4 Cognition: Follows commands Safety/Judgement: Fall prevention Psychosocial 
Patient Behaviors: Cooperative Manual Muscle Testing (LE) 
       R     L Hip Flexion:   4+/5  4+/5 Knee EXT:   4+/5  4+/5 Knee FLEX:   4+/5  4+/5 Ankle DF:   4+/5  4+/5 
_________________________________________________ Tone : BLE normal 
Sensation: not assessed Range Of Motion: BLE [facility] [place] [place] Functional Mobility: 
 
 
Functional Status Indep (I) Mod I Super-vision Min A Mod A Max A Total A Assist x2 Verbal cues Additional time Not tested Comments Rolling []  []  [] []    [x]    []  []  [] [] [] [] Supine to sit []  []  [] []  [x]  []  []  [] [] [] [] Sit to supine []  []  [] []  [x]  []  []  [] [] [] [] Sit to stand []  []  [] [x]  []  []  []  [] [] [] []   
Stand to sit []  []  [] [x]  []  []  []  [] [] [] [] Bed to chair transfers []  []  [] []  []  []  []  [] [] [] [x] Balance Good [address] Poor Unable Not tested Comments Sitting static [x]  []  []  []  [] Sitting dynamic [x]  []  []  []  []   
Standing static [x]  []  []  []  []   
Standing dynamic []  [x]  []  []  [] Mobility/Gait:  
Level of Assistance: Supervision Assistive Device: brace/splint and rolling walker Distance Ambulated: 25 feet Gait Abnormalities: antalgic and step to gait Therapeutic Exercises:  
Standing at ww 5 minutes Seated EOB 10 minutes Pain: 
Pre treatment pain level: 10 Post treatment pain level: 8 Pain Scale 1: Numeric (0 - 10) Pain Intensity 1: 10 
Pain Location 1: Back Pain Orientation 1: Posterior Pain Description 1: Lopez Le Activity Tolerance:  
Fair Please refer to the flowsheet for vital signs taken during this treatment. After treatment:  
[]         Patient left in no apparent distress sitting up in chair 
[x]         Patient left in no apparent distress in bed 
[x]         Call bell left within reach [x]         Nursing notified 
[]         Caregiver present 
[]         Bed alarm activated COMMUNICATION/EDUCATION:  
[x]         Fall prevention education was provided and the patient/caregiver indicated understanding. [x]         Patient/family have participated as able in goal setting and plan of care. [x]         Patient/family agree to work toward stated goals and plan of care. []         Patient understands intent and goals of therapy, but is neutral about his/her participation. []         Patient is unable to participate in goal setting and plan of care. Thank you for this referral. 
Grey Oliver, PT Time Calculation: 30 mins

## 2018-08-30 NOTE — PROGRESS NOTES
Progress Note Post Operative Day: 1 Assessment: 1. Status post  REMOVE SPINE FIX DEV,POST SGMTAL [29778] (SPINE TRANSFORAMINAL LUMBAR INTERBODY FUSION (TLIF)) EXPLORATION OF SPINAL FUSION [67451] LAMINEC/FACETECT/FORAMIN,LUMBAR [68364] LAMINEC/FACETECT/FORAMIN,EACH ADDNL [39026] ME ARTHDSIS POST/POSTEROLATRL/POSTINTERBODY LUMBAR [36607] INSERT VERT FIX DEV,POST,3-6 SGMTS [96044] INSERT PELVIC FIXATION DEVICE [12305] ME ARTHRODESIS POSTERIOR/POSTEROLATERAL LUMBAR [place] [address] [facility] [[id number]] SPIN BONE AUTOGRFT LOCAL [64627] for pa  s32.009k Pseudoarthrosis of lumbar spine Pseudarthrosis following spinal fusion 8/29/2018,   Progressing. PLAN:   
1. Mobilize. Continue P.T. 
2. Brace 3. Discharge Planning home 1-2 days HPI: Zuleika Renee is a 48 y.o. female patient without new complaints status post fusion for pa  s32.009k Pseudoarthrosis of lumbar spine Pseudarthrosis following spinal fusion 8/29/2018. Blood pressure 103/70, pulse 94, temperature 99.5 °F (37.5 °C), resp. rate 18, height 5' 7.5\" (1.715 m), weight 85.3 kg (188 lb), SpO2 98 %. CBC w/Diff Lab Results Component Value Date/Time WBC 9.1 08/30/2018 06:42 AM  
 RBC 3.48 (L) 08/30/2018 06:42 AM  
 HCT 29.8 (L) 08/30/2018 06:42 AM  
  
 
 
Physical Assessment: 
General: in no apparent distress Extremities:  Neurovascular intact Dressing:  Dry  
Urologic Nicholson out today DVT Exam:   No exam evidence to suggest DVT. Compartments soft and NT.  
  
 
    
 
- Alba Martinez PA-C 
8/30/2018 Office [phone] Cell [phone]

## 2018-08-30 NOTE — PROGRESS NOTES
Chart accessed to assist with plan of care. @ 6257  Ortho rounds complete with MD, PA and RN. All questions answered. Encouraged use of ICS, PT/OT. Plan is to discharge home with [facility] once medically and surgically stable. Nursing to follow.

## 2018-08-30 NOTE — PROGRESS NOTES
Bedside shift report received from Avelino with sbar Dr. Shadia Ojeda in to see patient Tolerated diet 
0950 medicated with morphine Physio in working with patient 1330 patient had opened purse saw 2 medicine containers, with patients permission I removed the bottles advil and Valium. atient denies taking any. Stated she will send home with family when she gets here. Medicine removed and locked up. Medication bottles x2 given to sister to take home Unable to void, bladder scan 338. Dr. Altaf Cruz notified of temp. 101. Orders obtained to straight cath, ua and cand s. Will put on abx 
1630 patient straight cathed by nursing instructor Diamond Atwood only 70 ccc obtained [place]  Given for pain. Instructed to use I.S. Hourly 10 times 1755 bp 85/55 temp. 100.4 spoke with  ordered stat h ad h and bolus of 500 cc ns 1830 lab here drawing h and h Called  made aware of vitals orders for blood I unit and iv change received Bedside shift report given to OPAL Stafford with sbar

## 2018-08-31 LAB
ABO + RH BLD: NORMAL
BLD PROD TYP BPU: NORMAL
BLOOD GROUP ANTIBODIES SERPL: NORMAL
BPU ID: NORMAL
CALLED TO:,BCALL1: NORMAL
CROSSMATCH RESULT,%XM: NORMAL
GLUCOSE BLD STRIP.AUTO-MCNC: 166 MG/DL (ref 70–110)
GLUCOSE BLD STRIP.AUTO-MCNC: 169 MG/DL (ref 70–110)
GLUCOSE BLD STRIP.AUTO-MCNC: 225 MG/DL (ref 70–110)
GLUCOSE BLD STRIP.AUTO-MCNC: 96 MG/DL (ref 70–110)
HCT VFR BLD AUTO: 31.4 % (ref 35–45)
HGB BLD-MCNC: 10.2 G/DL (ref 12–16)
SPECIMEN EXP DATE BLD: NORMAL
STATUS OF UNIT,%ST: NORMAL
UNIT DIVISION, %UDIV: 0

## 2018-08-31 PROCEDURE — 36415 COLL VENOUS BLD VENIPUNCTURE: CPT | Performed by: INTERNAL MEDICINE

## 2018-08-31 PROCEDURE — 85018 HEMOGLOBIN: CPT | Performed by: INTERNAL MEDICINE

## 2018-08-31 PROCEDURE — 97116 GAIT TRAINING THERAPY: CPT

## 2018-08-31 PROCEDURE — 74011000258 HC RX REV CODE- 258: Performed by: INTERNAL MEDICINE

## 2018-08-31 PROCEDURE — 82962 GLUCOSE BLOOD TEST: CPT

## 2018-08-31 PROCEDURE — 97530 THERAPEUTIC ACTIVITIES: CPT

## 2018-08-31 PROCEDURE — 74011250637 HC RX REV CODE- 250/637: Performed by: INTERNAL MEDICINE

## 2018-08-31 PROCEDURE — 74011636637 HC RX REV CODE- 636/637: Performed by: ORTHOPAEDIC SURGERY

## 2018-08-31 PROCEDURE — 77010033678 HC OXYGEN DAILY

## 2018-08-31 PROCEDURE — 65270000029 HC RM PRIVATE

## 2018-08-31 PROCEDURE — 74011250636 HC RX REV CODE- 250/636: Performed by: INTERNAL MEDICINE

## 2018-08-31 RX ORDER — MORPHINE SULFATE 2 MG/ML
4 INJECTION, SOLUTION INTRAMUSCULAR; INTRAVENOUS
Status: DISCONTINUED | OUTPATIENT
Start: 2018-08-31 | End: 2018-09-01 | Stop reason: HOSPADM

## 2018-08-31 RX ADMIN — DOCUSATE SODIUM 100 MG: 100 CAPSULE, LIQUID FILLED ORAL at 17:28

## 2018-08-31 RX ADMIN — CEFTRIAXONE 1 G: 1 INJECTION, POWDER, FOR SOLUTION INTRAMUSCULAR; INTRAVENOUS at 17:27

## 2018-08-31 RX ADMIN — Medication 10 ML: at 06:29

## 2018-08-31 RX ADMIN — DIAZEPAM 5 MG: 5 TABLET ORAL at 17:28

## 2018-08-31 RX ADMIN — DOCUSATE SODIUM 100 MG: 100 CAPSULE, LIQUID FILLED ORAL at 09:11

## 2018-08-31 RX ADMIN — OXYCODONE HYDROCHLORIDE AND ACETAMINOPHEN 2 TABLET: 10; 325 TABLET ORAL at 09:05

## 2018-08-31 RX ADMIN — GABAPENTIN 300 MG: 300 CAPSULE ORAL at 17:28

## 2018-08-31 RX ADMIN — MONTELUKAST SODIUM 10 MG: 10 TABLET, FILM COATED ORAL at 21:56

## 2018-08-31 RX ADMIN — DIAZEPAM 5 MG: 5 TABLET ORAL at 11:14

## 2018-08-31 RX ADMIN — TIOTROPIUM BROMIDE 18 MCG: 18 CAPSULE ORAL; RESPIRATORY (INHALATION) at 17:30

## 2018-08-31 RX ADMIN — GABAPENTIN 300 MG: 300 CAPSULE ORAL at 09:11

## 2018-08-31 RX ADMIN — INSULIN LISPRO 2 UNITS: 100 INJECTION, SOLUTION INTRAVENOUS; SUBCUTANEOUS at 09:27

## 2018-08-31 RX ADMIN — OXYCODONE HYDROCHLORIDE AND ACETAMINOPHEN 2 TABLET: 10; 325 TABLET ORAL at 19:59

## 2018-08-31 RX ADMIN — OXYCODONE HYDROCHLORIDE AND ACETAMINOPHEN 2 TABLET: 10; 325 TABLET ORAL at 14:23

## 2018-08-31 RX ADMIN — INSULIN LISPRO 2 UNITS: 100 INJECTION, SOLUTION INTRAVENOUS; SUBCUTANEOUS at 14:25

## 2018-08-31 RX ADMIN — SODIUM CHLORIDE 100 ML/HR: 900 INJECTION, SOLUTION INTRAVENOUS at 06:29

## 2018-08-31 RX ADMIN — ARIPIPRAZOLE 30 MG: 10 TABLET ORAL at 09:11

## 2018-08-31 RX ADMIN — SODIUM CHLORIDE 125 ML/HR: 900 INJECTION, SOLUTION INTRAVENOUS at 14:25

## 2018-08-31 RX ADMIN — INSULIN LISPRO 4 UNITS: 100 INJECTION, SOLUTION INTRAVENOUS; SUBCUTANEOUS at 21:57

## 2018-08-31 NOTE — PROGRESS NOTES
0800  Received  Pt on bed, alert and oriented, written pain med on the board, to alternate oral pain med with Valium, wiggle  Toes, moving all extremities, denies tingling and denies  Numbness of extremities, back dressing dry and intact. 1000  Walk with minimal assist by staff to the bathroom, pain under control at this time. 1200  Pain under control, void with assist to the bathroom. 1400  Pain is bearable  since she was given also Valium 1600  Moving all extremities, denies tingling and denies any numbness. 2000  Dressing dry  And intact, medicated  2 Percoset for back pain.

## 2018-08-31 NOTE — PROGRESS NOTES
Problem: Falls - Risk of 
Goal: *Absence of Falls Document Laura Jaeger Fall Risk and appropriate interventions in the flowsheet. Outcome: Progressing Towards Goal 
Fall Risk Interventions: 
Mobility Interventions: Patient to call before getting OOB, PT Consult for assist device competence Medication Interventions: Assess postural VS orthostatic hypotension, Evaluate medications/consider consulting pharmacy, Patient to call before getting OOB, Teach patient to arise slowly Elimination Interventions: Call light in reach, Toileting schedule/hourly rounds Problem: Pressure Injury - Risk of 
Goal: *Prevention of pressure injury Document Daniel Scale and appropriate interventions in the flowsheet. Outcome: Progressing Towards Goal 
Pressure Injury Interventions: 
Sensory Interventions: Assess changes in LOC, Check visual cues for pain Activity Interventions: Pressure redistribution bed/mattress(bed type), Increase time out of bed Mobility Interventions: HOB 30 degrees or less, Pressure redistribution bed/mattress (bed type), PT/OT evaluation Nutrition Interventions: Document food/fluid/supplement intake

## 2018-08-31 NOTE — PROGRESS NOTES
Problem: Mobility Impaired (Adult and Pediatric) Goal: *Acute Goals and Plan of Care (Insert Text) Physical Therapy Goals Initiated 8/30/2018 and to be accomplished within 7 day(s) 1. Patient will move from supine to sit and sit to supine  in bed with modified independence. 2.  Patient will transfer from bed to chair and chair to bed with modified independence using the least restrictive device. 3.  Patient will perform sit to stand with modified independence. 4.  Patient will ambulate with modified independence for 150 feet with the least restrictive device. 5.  Patient will ascend/descend 10 stairs with handrail(s) with modified independence. Outcome: Progressing Towards Goal 
 
PHYSICAL THERAPY: Daily TREATMENT Note INPATIENT: Medicare: Hospital Day: 3 Patient: Faustino Sandifer (78 y.o. female)    Date: 8/31/2018 Primary Diagnosis: pa  s32.009k Pseudoarthrosis of lumbar spine Pseudarthrosis following spinal fusion Procedure(s) (LRB): 
remove globus and explore fusion l4-s1,decompression l3-4 and left l4-s1, TRANSFORAMINAL  INTERBODY FUSION l5-s1, (Left) 
flospine l4-s1,iliac bolts,posterior spine fusion l4-s1,bmp (N/A), 2 Days Post-Op, Precautions: Fall Chart, physical therapy assessment, plan of care and goals were reviewed. PLOF: Independent ASSESSMENT: 
Min A for supine to sit. Seated EOB 3 minutes. Sit to stand with supervision. Donned lumbar brace in standing with supervision. Remained standing 10 minutes with intermittent UE assist on ww and supervision. Amb 8ft from bed to chair. Decreased gait speed and shortened step length. Additional time for all mobility. Demonstrates good muscular control with transfers. Seated in chair with call bell. Oriented to walker provided for discharge. Sized appropriately for patient. All needs in reach. RN Ivette Esquivel aware. Progression toward goals: 
         Improving slowly and progressing toward goals PLAN: 
 Patient continues to benefit from skilled intervention to address the above impairments. Continue treatment per established plan of care. EDUCATION:  
Education:  Patient was educated on the following topics: Bed mobility, transfers, ADLs, balance, amb, safety, exercise, role of PT, plan of care, cognition, skin integrity, vitals Barriers to Learning/Limitations: None Compensate with: visual, verbal, tactile, kinesthetic cues/model Discharge Recommendations:  Home Health Further Equipment Recommendations for Discharge:  rolling walker SUBJECTIVE:  
Agreeable to PT OBJECTIVE DATA SUMMARY:  
Critical Behavior: 
Neurologic State: Alert Orientation Level: Oriented X4 Cognition: Follows commands Safety/Judgement: Fall prevention G CODE:Mobility [id number] Current  CJ= 20-39%   Goal  CI= 1-19%. The severity rating is based on the Other [facility] Balance Scale 3+/5 [address] Standing Balance Scale 3+/5 
0: Pt performs 25% or less of standing activity (Max assist) CN, 100% impaired. 1: Pt supports self with upper extremities but requires therapist assistance. Pt performs 25-50% of effort (Mod assist) CM, 80% to <100% impaired. 1+: Pt supports self with upper extremities but requires therapist assistance. Pt performs >50% effort. (Min assist). CL, 60% to <80% impaired. 2: Pt supports self independently with both upper extremities (walker, crutches, parallel bars). CL, 60% to <80% impaired. 2+: Pt support self independently with 1 upper extremity (cane, crutch, 1 parallel bar). CK, 40% to <60% impaired. 3: Pt stands without upper extremity support for up to 30 seconds. CK, 40% to <60% impaired. 3+: Pt stands without upper extremity support for 30 seconds or greater. CJ, 20% to <40% impaired. 4: Pt independently moves and returns center of gravity 1-2 inches in one plane. CJ, 20% to <40% impaired. 4+: Pt independently moves and returns center of gravity 1-2 inches in multiple planes. CI, 1% to <20% impaired. 5: Pt independently moves and returns center of gravity in all planes greater than 2 inches. CH, 0% impaired. Functional Mobility: 
 
 
Functional Status Indep (I) Mod I Super-vision Min A Mod A Max A Total A Assist x2 Verbal cues Additional time Not tested Comments Rolling []  []  [] [x]    []    []  []  [] [] [] [] Supine to sit []  []  [] [x]  []  []  []  [] [] [] [] Sit to supine []  []  [] []  []  []  []  [] [] [] [x] Seated EOB Sit to stand []  []  [x] []  []  []  []  [] [] [] []   
Stand to sit []  []  [x] []  []  []  []  [] [] [] [] Bed to chair transfers []  []  [] []  []  []  []  [] [] [] [x] Balance Good Juan Stage Poor Unable Not tested Comments Sitting static [x]  []  []  []  [] Sitting dynamic [x]  []  []  []  []   
Standing static [x]  []  []  []  []   
Standing dynamic []  [x]  []  []  [] Mobility/Gait:  
Level of Assistance: Supervision Assistive Device: rolling walker Distance Ambulated: 8 feet Neuro Re-Education: 
Standing 10 minutes Pain: 
Pre treatment pain level:8 Post treatment pain level:8 Pain Scale 1: Numeric (0 - 10) Pain Intensity 1: 8 Pain Location 1: Back Pain Orientation 1: Posterior Activity Tolerance:  
Fair After treatment:  
Patient left in no apparent distress sitting up in chair Call bell left within reach Nursing notified Murtaza Valdivia PT Time Calculation: 15 mins

## 2018-08-31 NOTE — PROGRESS NOTES
PROGRESS NOTE Patient: Bishop Corona MRN: [id number]  CSN: [id number] YOB: 1965  Age: 48 y.o. Sex: female DOA: 8/29/2018 LOS:  LOS: 2 days Diagnosis: pa  s32.009k   
   
PROCEDURE:  
Remove globus and explore fusion l4-s1,decompression l3-4 and left l4-s1, TRANSFORAMINAL INTERBODY FUSION l5-s1,polaris l4-s1,iliac bolts,posterior spine fusion l4-s1,bmp HPI:  
C/O back pain; no leg numbness No CP SOB Participating with PT; no postural dizziness, no BLAKE Voiding well; no dysuria or visual hematuria No N/V or abdominal pain; + flatus; no BM Transfused  PRBC x 1 for post op H/H drop and hypotension ACTIVE MEDICAL PROBLEMS/PMH/PSH 
T2 DM dxd 2008; No SMBS;  
No ED or  Hospitalization for Hyper/hypoglycemia HTN Asthma Bipolar Asthma Avascular necrosis of bone of right hip ([facility] Chronic pain Depression Diabetes ([facility] [address] GERD (gastroesophageal reflux disease) Hepatitis C Hypertension Liver disease Nicotine vapor product user Psychotic disorder Sickle cell trait (HCC)        
Decreased exercise tolerance    SOME SOB WITH 2 FLIGHTS OF STEPS  
         
         
     NIDDM Arthropathy, unspecified, site unspecified    ARTHRITIS-BACK AREA Headache(784.0) [de-identified]  
         
Piercing    EARS Tattoo    RIGHT BREAST AREA,; RIGHT HAND 3 FINGERS Bipolar disorder (HCC)        
Leg cramps        
Hepatitis    HEPATITIS C DX'D 1990'S Mass of thigh    1.5 cm cystic mass lesion right ant/med Breast mass    right 2.0 cystic mass lesion UOQ  
  
  
PSH: 
     
Past Surgical History:  
Procedure Laterality Date  HX CHOLECYSTECTOMY      
 HX HEENT      
  EYE SX  
 HX ORTHOPAEDIC   2008  
  l4-l5-s1  HX ORTHOPAEDIC   3/2015  
  right foot decompression peroneal nerve Hospital Problems  Date Reviewed: 8/6/2018 Codes Class Noted POA Pseudoarthrosis of lumbar spine ICD-10-CM: S32.009K ICD-9-CM: 733.82  8/29/2018 Unknown Pseudarthrosis following spinal fusion ICD-10-CM: M96.0 ICD-9-CM: 996.49, V45.4  8/29/2018 Unknown Patient Vitals for the past 24 hrs: 
 Temp Pulse Resp BP SpO2  
08/31/18 1150 98.5 °F (36.9 °C) 97 16 97/67 93 % 08/31/18 0721 98.7 °F (37.1 °C) (!) 106 16 114/76 95 % 08/31/18 0614 98.8 °F (37.1 °C) (!) 108 16 136/85 95 % 08/31/18 0101 99 °F (37.2 °C) (!) 107 16 127/84 94 % 08/30/18 2351 99.3 °F (37.4 °C) (!) 110 16 122/81 95 % 08/30/18 2326 - (!) 111 16 114/76 93 % 08/30/18 2249 99.3 °F (37.4 °C) (!) 109 16 110/73 94 % 08/30/18 2204 - (!) 115 16 121/82 94 % 08/30/18 2148 98.8 °F (37.1 °C) (!) 114 16 113/79 96 % 08/30/18 2131 - (!) 105 16 120/79 100 % 08/30/18 2123 99 °F (37.2 °C) (!) 104 16 112/76 98 % 08/30/18 2018 98.3 °F (36.8 °C) (!) 104 16 104/72 98 % 08/30/18 1846 99.1 °F (37.3 °C) (!) 104 16 (!) 86/57 98 % 08/30/18 1747 100.4 °F (38 °C) (!) 115 16 (!) 85/55 93 % 08/30/18 1525 (!) 101 °F (38.3 °C) (!) 115 17 102/71 100 % ROS: 
Constitutional: No chills  No headache Cardiac: No CP. No orthopnea. No PND. No leg edema . No palpitation Respiratory: no cough . No SOB. No wheezing . No hemoptysis GI: per HPI 
: per HPI 
M/S:+ back pain Neuro: numbness Skin: no rash. No broken skin. PHYSICAL EXAM: 
GENERAL:  
LUNGS: CTA. BS Normal Bilaterally HEART: RRR   Normal S1 S2. No Significant murmur. No S3 S4 ABDOMEN: Soft. Normal BS. No tenderness. LE: No edema. PSYCHIATRIC   
Mood: Not Depressed. Not  Anxious    
Affect: appropriate                
                                  
 
Intake/Output Summary (Last 24 hours) at 08/31/18 1408 Last data filed at 08/31/18 0082 Gross per 24 hour Intake          4243.37 ml Output             2280 ml Net          1963.37 ml  
 
 
Current Shift:  08/31 0701 - 08/31 1900 In: 240 [P.O.:240] Out: 600 [Urine:600] Last three shifts:  08/29 1901 - 08/31 0700 In: 6167.5 [P.O.:1740; I.V.:4060.8] Out: 2355 [Urine:2355] Recent Results (from the past 24 hour(s)) URINALYSIS W/ RFLX MICROSCOPIC Collection Time: 08/30/18  4:30 PM  
Result Value Ref Range Color YELLOW Appearance CLEAR Specific gravity 1.019 1.005 - 1.030    
 pH (UA) 5.5 5.0 - 8.0 Protein NEGATIVE  NEG mg/dL Glucose 250 (A) NEG mg/dL Ketone NEGATIVE  NEG mg/dL Bilirubin NEGATIVE  NEG Blood NEGATIVE  NEG Urobilinogen 0.2 0.2 - 1.0 EU/dL Nitrites NEGATIVE  NEG Leukocyte Esterase MODERATE (A) NEG    
CULTURE, URINE Collection Time: 08/30/18  4:30 PM  
Result Value Ref Range Special Requests: NO SPECIAL REQUESTS Culture result: NO GROWTH AFTER 14 HOURS    
URINE MICROSCOPIC ONLY Collection Time: 08/30/18  4:30 PM  
Result Value Ref Range WBC 21 to 35 0 - 4 /hpf  
 RBC NONE 0 - 5 /hpf Epithelial cells FEW 0 - 5 /lpf Bacteria FEW (A) NEG /hpf  
GLUCOSE, POC Collection Time: 08/30/18  4:48 PM  
Result Value Ref Range Glucose (POC) 211 (H) 70 - 110 mg/dL HGB & HCT Collection Time: 08/30/18  6:22 PM  
Result Value Ref Range HGB 8.1 (L) 12.0 - 16.0 g/dL HCT 25.7 (L) 35.0 - 45.0 % GLUCOSE, POC Collection Time: 08/30/18  9:11 PM  
Result Value Ref Range Glucose (POC) 145 (H) 70 - 110 mg/dL HGB & HCT Collection Time: 08/31/18  5:50 AM  
Result Value Ref Range HGB 10.2 (L) 12.0 - 16.0 g/dL HCT 31.4 (L) 35.0 - 45.0 % GLUCOSE, POC Collection Time: 08/31/18  6:36 AM  
Result Value Ref Range Glucose (POC) 169 (H) 70 - 110 mg/dL GLUCOSE, POC Collection Time: 08/31/18 11:30 AM  
Result Value Ref Range Glucose (POC) 166 (H) 70 - 110 mg/dL Results for Qian Shields (MRN [id number]) as of 8/30/2018 15:44 Ref. Range 7/30/2018 11:11 8/30/2018 06:42 WBC Latest Ref Range: 4.6 - 13.2 K/uL 8.5 9.1 RBC Latest Ref Range: 4.20 - 5.30 M/uL 4.77 3.48 (L) HGB Latest Ref Range: 12.0 - 16.0 g/dL 12.9 9.4 (L) HCT Latest Ref Range: 35.0 - 45.0 % 40.5 29.8 (L) MCV Latest Ref Range: 74.0 - 97.0 FL 84.9 85.6 MCH Latest Ref Range: 24.0 - 34.0 PG 27.0 27.0 MCHC Latest Ref Range: 31.0 - 37.0 g/dL 31.9 31.5 RDW Latest Ref Range: 11.6 - 14.5 % 14.2 14.7 (H) PLATELET Latest Ref Range: 135 - 420 K/uL 161 122 (L) Lab Results Component Value Date/Time Glucose 78 07/30/2018 11:11 AM  
 Glucose 98 05/15/2018 09:39 AM  
 Glucose 129 (H) 02/13/2018 12:00 AM  
 Glucose 77 11/15/2017 09:35 AM  
 Glucose 97 08/14/2017 10:11 AM  
  
 
ASSESSMENT Mild  Hypotension Post op anemia; H/H 10.2/31.4 from 8.1/25.7 post PRBC x 1unit Urinary retention resolved HTN 
T2DM followed by Briseida Lei Asthma/COPD Bipolar Urine culture pending PLAN Continue Rocephin and IVF H/H Tentative home in [facility] Tanvi Saldivar MD 
8/31/2018, 3:47 PM

## 2018-08-31 NOTE — PROGRESS NOTES
Progress Note Post Operative Day: 2 Assessment: 1. Status post  REMOVE SPINE FIX DEV,POST SGMTAL [57177] (SPINE TRANSFORAMINAL LUMBAR INTERBODY FUSION (TLIF)) EXPLORATION OF SPINAL FUSION [51473] LAMINEC/FACETECT/FORAMIN,LUMBAR [67662] LAMINEC/FACETECT/FORAMIN,EACH ADDNL [58771] WI ARTHDSIS POST/POSTEROLATRL/POSTINTERBODY LUMBAR [56533] INSERT VERT FIX DEV,POST,3-6 SGMTS [31234] INSERT PELVIC FIXATION DEVICE [28205] WI ARTHRODESIS POSTERIOR/POSTEROLATERAL LUMBAR Teena perez [address] [place] [address] [17108] SPIN BONE AUTOGRFT LOCAL [59964] for pa  s32.009k Pseudoarthrosis of lumbar spine Pseudarthrosis following spinal fusion 8/29/2018,   Progressing. PLAN:   
1. Mobilize. Continue P.T. 
2. Brace 3. Discharge Planning home tomorrow if doing well HPI: Narayan Cotton is a 48 y.o. female patient without new complaints status post fusion for pa  s32.009k Pseudoarthrosis of lumbar spine Pseudarthrosis following spinal fusion 8/29/2018. No new orthopaedic changes. Blood pressure 114/76, pulse (!) 106, temperature 98.7 °F (37.1 °C), resp. rate 16, height 5' 7.5\" (1.715 m), weight 85.3 kg (188 lb), SpO2 95 %. CBC w/Diff Lab Results Component Value Date/Time WBC 9.1 08/30/2018 06:42 AM  
 RBC 3.48 (L) 08/30/2018 06:42 AM  
 HCT 31.4 (L) 08/31/2018 05:50 AM  
  
 
 
Physical Assessment: 
General: in no apparent distress Extremities:  Neurovascular intact Dressing:  Dry DVT Exam:   No exam evidence to suggest DVT. Compartments soft and NT.  
  
 
    
 
- Alba Martinez PA-C 
8/31/2018 Office [phone] Zczy [phone]

## 2018-08-31 NOTE — PROGRESS NOTES
Problem: Mobility Impaired (Adult and Pediatric) Goal: *Acute Goals and Plan of Care (Insert Text) Physical Therapy Goals Initiated 8/30/2018 and to be accomplished within 7 day(s) 1. Patient will move from supine to sit and sit to supine  in bed with modified independence. 2.  Patient will transfer from bed to chair and chair to bed with modified independence using the least restrictive device. 3.  Patient will perform sit to stand with modified independence. 4.  Patient will ambulate with modified independence for 150 feet with the least restrictive device. 5.  Patient will ascend/descend 10 stairs with handrail(s) with modified independence. Outcome: Progressing Towards Goal 
 
PHYSICAL THERAPY: Daily TREATMENT Note INPATIENT: Medicare: Hospital Day: 3 Patient: Zuleika Renee (5 y.o. female)    Date: 8/31/2018 Primary Diagnosis: pa  s32.009k Pseudoarthrosis of lumbar spine Pseudarthrosis following spinal fusion Procedure(s) (LRB): 
remove globus and explore fusion l4-s1,decompression l3-4 and left l4-s1, TRANSFORAMINAL  INTERBODY FUSION l5-s1, (Left) 
flospine l4-s1,iliac bolts,posterior spine fusion l4-s1,bmp (N/A), 2 Days Post-Op, Precautions: Fall Chart, physical therapy assessment, plan of care and goals were reviewed. PLOF: Independent ASSESSMENT: 
Seated EOB; agreeable to amb in jenkins. Supervision for sit to stand. Donned lumbar brace in standing independently. Amb 100ft with ww and supervision. Decreased gait speed. Doffed brace independently. Returned to supine in bed with mod A for sit to supine; BLE management. Compliant with lumbar precautions. All needs in reach. Progression toward goals: 
       Improving slowly and progressing toward goals PLAN: 
Patient continues to benefit from skilled intervention to address the above impairments. Continue treatment per established plan of care.  
 
EDUCATION:  
 Education:  Patient was educated on the following topics: Bed mobility, transfers, ADLs, balance, amb, safety, exercise, role of PT, plan of care, cognition, skin integrity, vitals Barriers to Learning/Limitations: None Compensate with: visual, verbal, tactile, kinesthetic cues/model Discharge Recommendations:  Home Health Further Equipment Recommendations for Discharge:  rolling walker SUBJECTIVE:  
Patient stated Hedwig Clubs do you want to walk?  OBJECTIVE DATA SUMMARY:  
Critical Behavior: 
Neurologic State: Alert Orientation Level: Oriented X4 Cognition: Follows commands Safety/Judgement: Fall prevention G CODE:Mobility [id number] Current  CJ= 20-39%   Goal  CI= 1-19%. The severity rating is based on the Other [employer] Balance Scale 3+/5 [facility] Standing Balance Scale 3+/5 
0: Pt performs 25% or less of standing activity (Max assist) CN, 100% impaired. 1: Pt supports self with upper extremities but requires therapist assistance. Pt performs 25-50% of effort (Mod assist) CM, 80% to <100% impaired. 1+: Pt supports self with upper extremities but requires therapist assistance. Pt performs >50% effort. (Min assist). CL, 60% to <80% impaired. 2: Pt supports self independently with both upper extremities (walker, crutches, parallel bars). CL, 60% to <80% impaired. 2+: Pt support self independently with 1 upper extremity (cane, crutch, 1 parallel bar). CK, 40% to <60% impaired. 3: Pt stands without upper extremity support for up to 30 seconds. CK, 40% to <60% impaired. 3+: Pt stands without upper extremity support for 30 seconds or greater. CJ, 20% to <40% impaired. 4: Pt independently moves and returns center of gravity 1-2 inches in one plane. CJ, 20% to <40% impaired. 4+: Pt independently moves and returns center of gravity 1-2 inches in multiple planes. CI, 1% to <20% impaired.  
5: Pt independently moves and returns center of gravity in all planes greater than 2 inches. CH, 0% impaired. Functional Mobility: 
 
 
Functional Status Indep (I) Mod I Super-vision Min A Mod A Max A Total A Assist x2 Verbal cues Additional time Not tested Comments Rolling []  []  [] []    []    []  []  [] [] [] [x] Supine to sit []  []  [] []  []  []  []  [] [] [] [x] Sit to supine []  []  [] []  [x]  []  []  [] [] [] [] BLE management Sit to stand []  []  [x] []  []  []  []  [] [] [] []   
Stand to sit []  []  [x] []  []  []  []  [] [] [] [] Bed to chair transfers []  []  [] []  []  []  []  [] [] [] [x] Balance Good Marisela Alice Poor Unable Not tested Comments Sitting static [x]  []  []  []  [] Sitting dynamic [x]  []  []  []  []   
Standing static []  [x]  []  []  []   
Standing dynamic []  [x]  []  []  [] Mobility/Gait:  
Level of Assistance: Supervision Assistive Device: rolling walker Distance Ambulated: 100 feet Speed/Marina: pace decreased (<100 feet/min) Gait Abnormalities: decreased step clearance Pain: 
Pre treatment pain level: 1 Post treatment pain level: 1 Pain Scale 1: Numeric (0 - 10) Pain Intensity 1: 1 Pain Location 1: Back Pain Description 1: Aching Pain Intervention(s) 1: Medication (see MAR) Activity Tolerance:  
Fair After treatment:  
Patient left in no apparent distress in bed Call bell left within reach Nursing notified Madan Anguiano PT Time Calculation: 15 mins

## 2018-08-31 NOTE — PROGRESS NOTES
Ordered the pt's bedside commode from [employer]  # [phone] via fax. .  Requested home delivery asap,

## 2018-08-31 NOTE — PROGRESS NOTES
Orthopaedics Patient without new complaints status post REMOVE SPINE FIX DEV,POST SGMTAL [98283] (SPINE TRANSFORAMINAL LUMBAR INTERBODY FUSION (TLIF)) EXPLORATION OF SPINAL FUSION [58996] LAMINEC/FACETECT/FORAMIN,LUMBAR [87642] LAMINEC/FACETECT/FORAMIN,EACH [facility] [62723] OR ARTHDSIS POST/POSTEROLATRL/POSTINTERBODY LUMBAR [86758] INSERT VERT FIX DEV,POST,3-6 SGMTS [30063] INSERT PELVIC FIXATION DEVICE [99900] OR ARTHRODESIS POSTERIOR/POSTEROLATERAL LUMBAR Sidonie Pickle [address] [facility] [12111] SPIN BONE AUTOGRFT LOCAL [06432] for pa  s32.009k Pseudoarthrosis of lumbar spine Pseudarthrosis following spinal fusion 8/29/2018. Doing well. Decreased lep.  voiding Visit Vitals  BP 97/67 (BP 1 Location: Left arm, BP Patient Position: Sitting)  Pulse 97  Temp 98.5 °F (36.9 °C)  Resp 16  
 Ht 5' 7.5\" (1.715 m)  Wt 85.3 kg (188 lb)  SpO2 93%  BMI 29.01 kg/m2 CBC w/Diff Lab Results Component Value Date/Time WBC 9.1 08/30/2018 06:42 AM  
 RBC 3.48 (L) 08/30/2018 06:42 AM  
 HCT 31.4 (L) 08/31/2018 05:50 AM  
 MCV 85.6 08/30/2018 06:42 AM  
 MCH 27.0 08/30/2018 06:42 AM  
 MCHC 31.5 08/30/2018 06:42 AM  
 RDW 14.7 (H) 08/30/2018 06:42 AM  
 Lab Results Component Value Date/Time MONOS 6 08/30/2018 06:42 AM  
 EOS 0 08/30/2018 06:42 AM  
 BASOS 0 08/30/2018 06:42 AM  
 RDW 14.7 (H) 08/30/2018 06:42 AM  
  
 
 
Physical exam:  Dressing dry. AT, GS intact  Bilateral Lower Extremities. Calves soft and nontender. Assessment:  Status post REMOVE SPINE FIX DEV,POST SGMTAL [70294] (SPINE TRANSFORAMINAL LUMBAR INTERBODY FUSION (TLIF)) EXPLORATION OF SPINAL FUSION [21299] LAMINEC/FACETECT/FORAMIN,LUMBAR [81121] LAMINEC/FACETECT/FORAMIN,EACH ADDNL [96639] OR ARTHDSIS POST/POSTEROLATRL/POSTINTERBODY LUMBAR [71662] INSERT VERT FIX DEV,POST,3-6 SGMTS [97045] INSERT PELVIC FIXATION DEVICE [47648] MS ARTHRODESIS POSTERIOR/POSTEROLATERAL LUMBAR Farrukh Martin [address] [facility] [67436] SPIN BONE AUTOGRFT LOCAL [67011] for pa  s32.009k Pseudoarthrosis of lumbar spine Pseudarthrosis following spinal fusion 8/29/2018, Doing well PLAN:  home in am with tlso and percocet 
rto 1 month Cheri Dueñas MD 
August 31, 2018

## 2018-08-31 NOTE — PROGRESS NOTES
Problem: Pressure Injury - Risk of 
Goal: *Prevention of pressure injury Document Daniel Scale and appropriate interventions in the flowsheet. Outcome: Progressing Towards Goal 
Pressure Injury Interventions: 
Sensory Interventions: Check visual cues for pain Activity Interventions: Increase time out of bed Mobility Interventions: HOB 30 degrees or less Nutrition Interventions: Document food/fluid/supplement intake

## 2018-09-01 ENCOUNTER — HOME HEALTH ADMISSION (OUTPATIENT)
Dept: HOME HEALTH SERVICES | Facility: HOME HEALTH | Age: 53
End: 2018-09-01
Payer: MEDICARE

## 2018-09-01 VITALS
SYSTOLIC BLOOD PRESSURE: 121 MMHG | HEART RATE: 98 BPM | DIASTOLIC BLOOD PRESSURE: 80 MMHG | HEIGHT: 68 IN | RESPIRATION RATE: 16 BRPM | TEMPERATURE: 98.1 F | WEIGHT: 188 LBS | OXYGEN SATURATION: 93 % | BODY MASS INDEX: 28.49 KG/M2

## 2018-09-01 LAB
BACTERIA SPEC CULT: NORMAL
GLUCOSE BLD STRIP.AUTO-MCNC: 185 MG/DL (ref 70–110)
HCT VFR BLD AUTO: 26.9 % (ref 35–45)
HGB BLD-MCNC: 8.8 G/DL (ref 12–16)
SERVICE CMNT-IMP: NORMAL

## 2018-09-01 PROCEDURE — 74011250637 HC RX REV CODE- 250/637: Performed by: INTERNAL MEDICINE

## 2018-09-01 PROCEDURE — 36415 COLL VENOUS BLD VENIPUNCTURE: CPT | Performed by: INTERNAL MEDICINE

## 2018-09-01 PROCEDURE — 85018 HEMOGLOBIN: CPT | Performed by: INTERNAL MEDICINE

## 2018-09-01 PROCEDURE — 74011636637 HC RX REV CODE- 636/637: Performed by: ORTHOPAEDIC SURGERY

## 2018-09-01 PROCEDURE — 74011250636 HC RX REV CODE- 250/636: Performed by: INTERNAL MEDICINE

## 2018-09-01 PROCEDURE — 82962 GLUCOSE BLOOD TEST: CPT

## 2018-09-01 RX ORDER — OXYCODONE AND ACETAMINOPHEN 10; 325 MG/1; MG/1
1-2 TABLET ORAL
Qty: 60 TAB | Refills: 0 | Status: SHIPPED
Start: 2018-09-01 | End: 2018-12-06

## 2018-09-01 RX ADMIN — TIOTROPIUM BROMIDE 18 MCG: 18 CAPSULE ORAL; RESPIRATORY (INHALATION) at 08:56

## 2018-09-01 RX ADMIN — DOCUSATE SODIUM 100 MG: 100 CAPSULE, LIQUID FILLED ORAL at 08:55

## 2018-09-01 RX ADMIN — Medication 10 ML: at 05:19

## 2018-09-01 RX ADMIN — SODIUM CHLORIDE 125 ML/HR: 900 INJECTION, SOLUTION INTRAVENOUS at 00:20

## 2018-09-01 RX ADMIN — INSULIN LISPRO 2 UNITS: 100 INJECTION, SOLUTION INTRAVENOUS; SUBCUTANEOUS at 08:52

## 2018-09-01 RX ADMIN — ARIPIPRAZOLE 30 MG: 10 TABLET ORAL at 08:54

## 2018-09-01 RX ADMIN — GABAPENTIN 300 MG: 300 CAPSULE ORAL at 08:55

## 2018-09-01 RX ADMIN — OXYCODONE HYDROCHLORIDE AND ACETAMINOPHEN 1 TABLET: 10; 325 TABLET ORAL at 10:33

## 2018-09-01 RX ADMIN — Medication 10 ML: at 00:19

## 2018-09-01 RX ADMIN — OXYCODONE HYDROCHLORIDE AND ACETAMINOPHEN 2 TABLET: 10; 325 TABLET ORAL at 00:19

## 2018-09-01 RX ADMIN — OXYCODONE HYDROCHLORIDE AND ACETAMINOPHEN 2 TABLET: 10; 325 TABLET ORAL at 06:07

## 2018-09-01 NOTE — PROGRESS NOTES
Problem: Falls - Risk of 
Goal: *Absence of Falls Document Zeny President Fall Risk and appropriate interventions in the flowsheet. Outcome: Progressing Towards Goal 
Fall Risk Interventions: 
Mobility Interventions: OT consult for ADLs Medication Interventions: Assess postural VS orthostatic hypotension, Patient to call before getting OOB, Teach patient to arise slowly Elimination Interventions: Call light in reach, Patient to call for help with toileting needs Problem: Pressure Injury - Risk of 
Goal: *Prevention of pressure injury Document Daniel Scale and appropriate interventions in the flowsheet. Outcome: Progressing Towards Goal 
Pressure Injury Interventions: 
Sensory Interventions: Assess changes in LOC, Keep linens dry and wrinkle-free Activity Interventions: Increase time out of bed, Pressure redistribution bed/mattress(bed type) Mobility Interventions: HOB 30 degrees or less Nutrition Interventions: Document food/fluid/supplement intake

## 2018-09-01 NOTE — DISCHARGE INSTRUCTIONS
DISCHARGE SUMMARY from Nurse    PATIENT INSTRUCTIONS:    After general anesthesia or intravenous sedation, for 24 hours or while taking prescription Narcotics:  · Limit your activities  · Do not drive and operate hazardous machinery  · Do not make important personal or business decisions  · Do  not drink alcoholic beverages  · If you have not urinated within 8 hours after discharge, please contact your surgeon on call. Report the following to your surgeon:  · Excessive pain, swelling, redness or odor of or around the surgical area  · Temperature over 100.5  · Nausea and vomiting lasting longer than 4 hours or if unable to take medications  · Any signs of decreased circulation or nerve impairment to extremity: change in color, persistent  numbness, tingling, coldness or increase pain  · Any questions    What to do at Home:  Recommended activity: Activity as tolerated and no driving for today and Ambulate in house,     If you experience any of the following symptoms like increasing pain  , please follow up with dr Hoda Jaimes  . *  Please give a list of your current medications to your Primary Care Provider. *  Please update this list whenever your medications are discontinued, doses are      changed, or new medications (including over-the-counter products) are added. *  Please carry medication information at all times in case of emergency situations. These are general instructions for a healthy lifestyle:    No smoking/ No tobacco products/ Avoid exposure to second hand smoke  Surgeon General's Warning:  Quitting smoking now greatly reduces serious risk to your health.     Obesity, smoking, and sedentary lifestyle greatly increases your risk for illness    A healthy diet, regular physical exercise & weight monitoring are important for maintaining a healthy lifestyle    You may be retaining fluid if you have a history of heart failure or if you experience any of the following symptoms:  Weight gain of 3 pounds or more overnight or 5 pounds in a week, increased swelling in our hands or feet or shortness of breath while lying flat in bed. Please call your doctor as soon as you notice any of these symptoms; do not wait until your next office visit. Recognize signs and symptoms of STROKE:    F-face looks uneven    A-arms unable to move or move unevenly    S-speech slurred or non-existent    T-time-call 911 as soon as signs and symptoms begin-DO NOT go       Back to bed or wait to see if you get better-TIME IS BRAIN. Warning Signs of HEART ATTACK     Call 911 if you have these symptoms:   Chest discomfort. Most heart attacks involve discomfort in the center of the chest that lasts more than a few minutes, or that goes away and comes back. It can feel like uncomfortable pressure, squeezing, fullness, or pain.  Discomfort in other areas of the upper body. Symptoms can include pain or discomfort in one or both arms, the back, neck, jaw, or stomach.  Shortness of breath with or without chest discomfort.  Other signs may include breaking out in a cold sweat, nausea, or lightheadedness. Don't wait more than five minutes to call 911 - MINUTES MATTER! Fast action can save your life. Calling 911 is almost always the fastest way to get lifesaving treatment. Emergency Medical Services staff can begin treatment when they arrive -- up to an hour sooner than if someone gets to the hospital by car. Patient armband removed and shredded  MyChart Activation    Thank you for requesting access to [employer]. Please follow the instructions below to securely access and download your online medical record. [employer] allows you to send messages to your doctor, view your test results, renew your prescriptions, schedule appointments, and more. How Do I Sign Up? 1. In your internet browser, go to [email or web address]  2. Click on the First Time User? Click Here link in the Sign In box.  You will be redirect to the New Member Sign Up page. 3. Enter your [employer] Access Code exactly as it appears below. You will not need to use this code after youve completed the sign-up process. If you do not sign up before the expiration date, you must request a new code. [employer] Access Code: [email or web address]  Expires: 2018  1:21 PM (This is the date your [employer] access code will )    4. Enter the last four digits of your Social Security Number (xxxx) and Date of Birth (mm/dd/yyyy) as indicated and click Submit. You will be taken to the next sign-up page. 5. Create a [employer] ID. This will be your [employer] login ID and cannot be changed, so think of one that is secure and easy to remember. 6. Create a [employer] password. You can change your password at any time. 7. Enter your Password Reset Question and Answer. This can be used at a later time if you forget your password. 8. Enter your e-mail address. You will receive e-mail notification when new information is available in [address] 9. Click Sign Up. You can now view and download portions of your medical record. 10. Click the Download Summary menu link to download a portable copy of your medical information. Additional Information    If you have questions, please visit the Frequently Asked Questions section of the [employer] website at [email or web address] Remember, [employer] is NOT to be used for urgent needs. For medical emergencies, dial 911. The discharge information has been reviewed with the patient and spouse. The patient and spouse verbalized understanding. Discharge medications reviewed with the patient and spouse   and appropriate educational materials and side effects teaching were provided.   ___________________________________________________________________________________________________________________________________

## 2018-09-01 NOTE — MANAGEMENT PLAN
Discharge/Transition Planning Called [facility] and notified Jake Purdy of discharge. Care Management Interventions PCP Verified by CM: Yes 
Palliative Care Criteria Met (RRAT>21 & CHF Dx)?: No 
Transition of Care Consult (CM Consult): Home Health [facility] Arreaga.: Yes Discharge Durable Medical Equipment: Yes (r/w) Physical Therapy Consult: Yes Current Support Network: Other (son and grandchildren) Confirm Follow Up Transport: Family Plan discussed with Pt/Family/Caregiver: Yes Freedom of Choice Offered: Yes Discharge Location Discharge Placement: Home with home health Karol Morton RN BSN Outcomes Manager Pager # [phone]

## 2018-09-01 NOTE — PROGRESS NOTES
PROGRESS NOTE Patient: Melva Russo MRN: [id number]  CSN: [id number] YOB: 1965  Age: 48 y.o. Sex: female DOA: 8/29/2018 LOS:  LOS: 3 days Diagnosis: pa  s32.009k   
   
PROCEDURE:  
Remove globus and explore fusion l4-s1,decompression l3-4 and left l4-s1, TRANSFORAMINAL INTERBODY FUSION l5-s1,polaris l4-s1,iliac bolts,posterior spine fusion l4-s1,bmp HPI:  
C/O back  pain ,requesting pain meds before going home; otherwise feels well; ambulating. No leg numbness No CP SOB No N/V or abdominal pain; + flatus; no BM Voiding well; no dysuria or visual hematuria ACTIVE MEDICAL PROBLEMS/PMH/PSH 
T2 DM dxd 2008; No SMBS;  
No ED or  Hospitalization for Hyper/hypoglycemia HTN Asthma Bipolar Asthma Avascular necrosis of bone of right hip ([address] Chronic pain Depression Diabetes ([address] GERD (gastroesophageal reflux disease) Hepatitis C Hypertension Liver disease Nicotine vapor product user Psychotic disorder Sickle cell trait (HCC)        
Decreased exercise tolerance    SOME SOB WITH 2 FLIGHTS OF STEPS  
         
         
     NIDDM Arthropathy, unspecified, site unspecified    ARTHRITIS-BACK AREA Headache(784.0) [de-identified]  
         
Piercing    EARS Tattoo    RIGHT BREAST AREA,; RIGHT HAND 3 FINGERS Bipolar disorder (HCC)        
Leg cramps        
Hepatitis    HEPATITIS C DX'D 1990'S Mass of thigh    1.5 cm cystic mass lesion right ant/med Breast mass    right 2.0 cystic mass lesion UOQ  
  
  
PSH: 
     
Past Surgical History:  
Procedure Laterality Date  HX CHOLECYSTECTOMY      
 HX HEENT      
  EYE SX  
 HX ORTHOPAEDIC   2008  
  l4-l5-s1  HX ORTHOPAEDIC   3/2015  
  right foot decompression peroneal nerve Hospital Problems  Date Reviewed: 8/6/2018 Codes Class Noted POA Pseudoarthrosis of lumbar spine ICD-10-CM: S32.009K ICD-9-CM: 733.82  8/29/2018 Unknown Pseudarthrosis following spinal fusion ICD-10-CM: M96.0 ICD-9-CM: 996.49, V45.4  8/29/2018 Unknown Patient Vitals for the past 24 hrs: 
 Temp Pulse Resp BP SpO2  
09/01/18 0852 98.1 °F (36.7 °C) - 16 121/80 93 % 09/01/18 0452 98.9 °F (37.2 °C) 98 16 105/73 92 % 09/01/18 0106 98.7 °F (37.1 °C) 96 16 107/76 95 % 08/31/18 1520 98.9 °F (37.2 °C) 97 16 97/68 93 % 08/31/18 1150 98.5 °F (36.9 °C) 97 16 97/67 93 % ROS: 
Constitutional: No chills  No headache Cardiac: No CP. No orthopnea. No PND. No leg edema . No palpitation Respiratory: no cough . No SOB. No wheezing GI: per HPI 
: per HPI 
M/S:+ back pain Neuro: no numbness Skin: No rash. No itching PHYSICAL EXAM: 
GENERAL: looks well; NAD 
LUNGS: CTA. BS Normal Bilaterally HEART: RRR   Normal S1 S2. No Significant murmur. No S3 S4 ABDOMEN: Soft. Normal BS. No tenderness. LE: No edema. PSYCHIATRIC   
Mood: Not Depressed. Not  Anxious    
Affect: appropriate                
                                  
 
Intake/Output Summary (Last 24 hours) at 09/01/18 1026 Last data filed at 09/01/18 0710 Gross per 24 hour Intake             1755 ml Output              800 ml Net              955 ml Current Shift:    
 
Last three shifts:  08/30 1901 - 09/01 0700 In: 5998.4 [P.O.:1990; I.V.:3641.7] Out: 3080 [LYDVW:8570] Recent Results (from the past 24 hour(s)) GLUCOSE, POC Collection Time: 08/31/18 11:30 AM  
Result Value Ref Range Glucose (POC) 166 (H) 70 - 110 mg/dL GLUCOSE, POC Collection Time: 08/31/18  4:35 PM  
Result Value Ref Range Glucose (POC) 96 70 - 110 mg/dL GLUCOSE, POC Collection Time: 08/31/18  9:11 PM  
Result Value Ref Range Glucose (POC) 225 (H) 70 - 110 mg/dL HGB & HCT Collection Time: 09/01/18  4:28 AM  
Result Value Ref Range HGB 8.8 (L) 12.0 - 16.0 g/dL HCT 26.9 (L) 35.0 - 45.0 % GLUCOSE, POC Collection Time: 09/01/18  6:05 AM  
Result Value Ref Range Glucose (POC) 185 (H) 70 - 110 mg/dL Results for Calvin Peterson (MRN [id number]) as of 8/30/2018 15:44 Ref. Range 7/30/2018 11:11 8/30/2018 06:42 WBC Latest Ref Range: 4.6 - 13.2 K/uL 8.5 9.1 RBC Latest Ref Range: 4.20 - 5.30 M/uL 4.77 3.48 (L) HGB Latest Ref Range: 12.0 - 16.0 g/dL 12.9 9.4 (L) HCT Latest Ref Range: 35.0 - 45.0 % 40.5 29.8 (L) MCV Latest Ref Range: 74.0 - 97.0 FL 84.9 85.6 MCH Latest Ref Range: 24.0 - 34.0 PG 27.0 27.0 MCHC Latest Ref Range: 31.0 - 37.0 g/dL 31.9 31.5 RDW Latest Ref Range: 11.6 - 14.5 % 14.2 14.7 (H) PLATELET Latest Ref Range: 135 - 420 K/uL 161 122 (L) Lab Results Component Value Date/Time Glucose 78 07/30/2018 11:11 AM  
 Glucose 98 05/15/2018 09:39 AM  
 Glucose 129 (H) 02/13/2018 12:00 AM  
 Glucose 77 11/15/2017 09:35 AM  
 Glucose 97 08/14/2017 10:11 AM  
  
 
ASSESSMENT Hypotension resolved Well hydrated Post op anemia; H/H dropped post transfusion HTN 
T2DM Asthma/COPD Bipolar Urine culture negative x 2 days PLAN Stable to D/C Resume PTA meds Colace 100 mg BID 
F/U with PCP in 1 week  Re: H/H Nik Newman MD 
9/1/2018, 3:47 PM

## 2018-09-01 NOTE — PROGRESS NOTES
0739- Bedside and Verbal shift change report given to Marisol Bustillos RN (oncoming nurse) by Jose Angel Baker RN (offgoing nurse). Report included the following information SBAR, Kardex and MAR.  
 
9266- Pt sitting up in bed, partial assist with hygiene care. 1113- Pt discharged home. Merary Owen RN reviewed discharge instructions with pt.

## 2018-09-01 NOTE — DISCHARGE SUMMARY
Discharge Summary    Admit Date: 2018  Discharge Date:  2018     Patient ID:   Name:  Holger Mckinney      Age:  48 y.o.    :  1965    Admitting Diagnosis: pa  s32.009k  Pseudoarthrosis of lumbar spine  Pseudarthrosis following spinal fusion     Post Operative Day: 3    Operative Procedures:  REMOVE SPINE FIX DEV,POST SGMTAL [43575] (SPINE TRANSFORAMINAL LUMBAR INTERBODY FUSION (TLIF))  EXPLORATION OF SPINAL FUSION [19636]  LAMINEC/FACETECT/FORAMIN,LUMBAR [84293]  LAMINEC/FACETECT/FORAMIN,EACH ADDNL [70287]  AZ ARTHDSIS POST/POSTEROLATRL/POSTINTERBODY LUMBAR [25603]  INSERT VERT FIX DEV,POST,3-6 SGMTS [34579]  INSERT PELVIC FIXATION DEVICE [51489]  [place] ARTHRODESIS POSTERIOR/POSTEROLATERAL LUMBAR []  SPINE FUSN,POST TECH,EA ADDNL SGMT [68893]  SPIN BONE AUTOGRFT LOCAL [12048]      Isolation Precautions:   Not required. Patient is not currently contagious. Physical Exam on Discharge:  Visit Vitals    /73 (BP 1 Location: Right arm, BP Patient Position: At rest)    Pulse 98    Temp 98.9 °F (37.2 °C)    Resp 16    Ht 5' 7.5\" (1.715 m)    Wt 85.3 kg (188 lb)    SpO2 92%    BMI 29.01 kg/m2         General: in no apparent distress   Extremities:  Neurovascular intact    Dressing:  Dry   DVT Exam:   No evidence of DVT seen on physical exam;  compartments soft and NT.          Relevant labs within last 72 hours:    CBC w/Diff    Lab Results   Component Value Date/Time    WBC 9.1 2018 06:42 AM    RBC 3.48 (L) 2018 06:42 AM    HCT 26.9 (L) 2018 04:28 AM    MCV 85.6 2018 06:42 AM    MCH 27.0 2018 06:42 AM    MCHC 31.5 2018 06:42 AM    RDW 14.7 (H) 2018 06:42 AM    Lab Results   Component Value Date/Time    MONOS 6 2018 06:42 AM    EOS 0 2018 06:42 AM    BASOS 0 2018 06:42 AM    RDW 14.7 (H) 2018 06:42 AM          BMP   Lab Results   Component Value Date     2018    CO2 32 2018    BUN 13 2018 Coagulation   No results found for: INR, APTT           Condition at discharge: Afebrile  Ambulating  Eating, Drinking, Voiding  Stable    Current Discharge Medication List      START taking these medications    Details   oxyCODONE-acetaminophen (PERCOCET 10)  mg per tablet Take 1-2 Tabs by mouth every four (4) hours as needed. Max Daily Amount: 12 Tabs. Qty: 60 Tab, Refills: 0    Associated Diagnoses: Chronic midline low back pain with sciatica, sciatica laterality unspecified         CONTINUE these medications which have NOT CHANGED    Details   potassium chloride (KLOR-CON) 10 mEq tablet TK 1 T PO D  Refills: 0      INCRUSE ELLIPTA 62.5 mcg/actuation inhaler INL 1 PUFF PO QD  Refills: 4      albuterol (VENTOLIN HFA) 90 mcg/actuation inhaler Take 2 Puffs by inhalation every six (6) hours as needed for Wheezing. Qty: 1 Inhaler, Refills: 5    Associated Diagnoses: Cough      montelukast (SINGULAIR) 10 mg tablet TAKE 1 TABLET BY MOUTH DAILY  Qty: 90 Tab, Refills: 3    Associated Diagnoses: Cough      hydroCHLOROthiazide (HYDRODIURIL) 25 mg tablet TAKE 1 TABLET BY MOUTH DAILY  Qty: 90 Tab, Refills: 1    Associated Diagnoses: Essential hypertension, benign      lisinopril (PRINIVIL, ZESTRIL) 5 mg tablet Take 1 Tab by mouth daily. Qty: 90 Tab, Refills: 1    Associated Diagnoses: Essential hypertension, benign      SITagliptin-metFORMIN (JANUMET)  mg per tablet Take 1 Tab by mouth two (2) times daily (with meals). Qty: 180 Tab, Refills: 1    Associated Diagnoses: Type 2 diabetes mellitus without complication, without long-term current use of insulin (HCC)      fluticasone-vilanterol (BREO ELLIPTA) 100-25 mcg/dose inhaler INHALE ONE PUFF BY MOUTH DAILY  Qty: 1 Inhaler, Refills: 5    Associated Diagnoses: Cough      ABILIFY 30 mg tablet daily. escitalopram (LEXAPRO) 20 mg tablet Take 20 mg by mouth daily. gabapentin (NEURONTIN) 600 mg tablet Take  by mouth two (2) times a day. diazepam (VALIUM) 10 mg tablet Take 10 mg by mouth every six (6) hours as needed. tapentadol (NUCYNTA) 100 mg tablet Take 100 mg by mouth every six (6) hours as needed. omeprazole (PRILOSEC) 20 mg capsule Take 20 mg by mouth daily. PROAIR RESPICLICK 90 mcg/actuation aepb INL 2 PFS PO Q 6 H PRF WHZ  Refills: 5      !! TRUEPLUS LANCETS 30 gauge misc TEST GLUCOSE ONCE  DAILY  Refills: 11      glucose blood VI test strips (BLOOD GLUCOSE TEST) strip True resultTest blood glucose daily  Qty: 100 Strip, Refills: 1    Associated Diagnoses: Type 2 diabetes mellitus without complication, without long-term current use of insulin ([address]      ! ! Lancets misc True results lancets Test glucose daily  Qty: 100 Each, Refills: 11    Associated Diagnoses: Type 2 diabetes mellitus without complication, without long-term current use of insulin ([address]       ! ! - Potential duplicate medications found. Please discuss with provider. PCP:  Doe Cordero PA-C        Disposition:  Clear for discharge to home, if clear by medicine service. Follow-up in the office in  1 month with Dr. Eliezer Bloes; call [phone] to schedule appointment.      Wound Care: open to air POD 5         -[phone] Reese Mcneal PA-C  9/1/2018  Office [phone]  Cell [phone]

## 2018-09-01 NOTE — PROGRESS NOTES
1923:  Bedside report received from Demarcus Pineda RN. Patient in bed, resting. No signs of distress. Complaints of pain 8/10. Amarilys Darío states she will medicate patient for pain. Call bell within reach. Will continue to monitor. 3557:  Patient assisted back to bed from [facility]. SCDs placed back on patient. Patient repositioned. Call bell within reach. Room phone within reach. Bedside shift change report given to Demacrus Pineda RN (oncoming nurse) by Fatou Pike RN (offgoing nurse). Report included the following information SBAR, Procedure Summary, MAR, Recent Results and Med Rec Status.

## 2018-09-01 NOTE — PROGRESS NOTES
0900  Discharge instructions given including incisional care, IV site no redness and no swelling. Medicated for pain prior to discharge, back original dressing intact, re inforce  with  Cover site tape, no swelling around the dressing, denies tingling and denies nausea, was walking in the hallway with assist, going home with walker and her potty chair be delivered at home. 1130  Discharge in good spirit.

## 2018-09-02 ENCOUNTER — HOME CARE VISIT (OUTPATIENT)
Dept: SCHEDULING | Facility: HOME HEALTH | Age: 53
End: 2018-09-02
Payer: MEDICARE

## 2018-09-02 PROCEDURE — 400013 HH SOC

## 2018-09-02 PROCEDURE — 3331090002 HH PPS REVENUE DEBIT

## 2018-09-02 PROCEDURE — 3331090001 HH PPS REVENUE CREDIT

## 2018-09-02 PROCEDURE — G0151 HHCP-SERV OF PT,EA 15 MIN: HCPCS

## 2018-09-03 ENCOUNTER — HOME CARE VISIT (OUTPATIENT)
Dept: HOME HEALTH SERVICES | Facility: HOME HEALTH | Age: 53
End: 2018-09-03
Payer: MEDICARE

## 2018-09-03 VITALS
HEART RATE: 70 BPM | TEMPERATURE: 97.6 F | DIASTOLIC BLOOD PRESSURE: 70 MMHG | SYSTOLIC BLOOD PRESSURE: 120 MMHG | RESPIRATION RATE: 17 BRPM

## 2018-09-03 PROCEDURE — 3331090002 HH PPS REVENUE DEBIT

## 2018-09-03 PROCEDURE — 3331090001 HH PPS REVENUE CREDIT

## 2018-09-04 ENCOUNTER — HOME CARE VISIT (OUTPATIENT)
Dept: HOME HEALTH SERVICES | Facility: HOME HEALTH | Age: 53
End: 2018-09-04
Payer: MEDICARE

## 2018-09-04 VITALS — TEMPERATURE: 97.2 F | SYSTOLIC BLOOD PRESSURE: 122 MMHG | DIASTOLIC BLOOD PRESSURE: 78 MMHG

## 2018-09-04 PROCEDURE — 3331090001 HH PPS REVENUE CREDIT

## 2018-09-04 PROCEDURE — 3331090002 HH PPS REVENUE DEBIT

## 2018-09-04 PROCEDURE — G0157 HHC PT ASSISTANT EA 15: HCPCS

## 2018-09-05 PROCEDURE — 3331090001 HH PPS REVENUE CREDIT

## 2018-09-05 PROCEDURE — 3331090002 HH PPS REVENUE DEBIT

## 2018-09-06 ENCOUNTER — HOME CARE VISIT (OUTPATIENT)
Dept: SCHEDULING | Facility: HOME HEALTH | Age: 53
End: 2018-09-06
Payer: MEDICARE

## 2018-09-06 VITALS
SYSTOLIC BLOOD PRESSURE: 118 MMHG | TEMPERATURE: 98.6 F | DIASTOLIC BLOOD PRESSURE: 68 MMHG | OXYGEN SATURATION: 99 % | HEART RATE: 88 BPM

## 2018-09-06 PROCEDURE — 3331090001 HH PPS REVENUE CREDIT

## 2018-09-06 PROCEDURE — G0152 HHCP-SERV OF OT,EA 15 MIN: HCPCS

## 2018-09-06 PROCEDURE — 3331090002 HH PPS REVENUE DEBIT

## 2018-09-06 PROCEDURE — G0151 HHCP-SERV OF PT,EA 15 MIN: HCPCS

## 2018-09-07 VITALS
HEART RATE: 88 BPM | TEMPERATURE: 98.6 F | OXYGEN SATURATION: 99 % | DIASTOLIC BLOOD PRESSURE: 68 MMHG | SYSTOLIC BLOOD PRESSURE: 118 MMHG

## 2018-09-07 PROCEDURE — 3331090001 HH PPS REVENUE CREDIT

## 2018-09-07 PROCEDURE — 3331090002 HH PPS REVENUE DEBIT

## 2018-09-08 PROCEDURE — 3331090001 HH PPS REVENUE CREDIT

## 2018-09-08 PROCEDURE — 3331090002 HH PPS REVENUE DEBIT

## 2018-09-09 PROCEDURE — 3331090001 HH PPS REVENUE CREDIT

## 2018-09-09 PROCEDURE — 3331090002 HH PPS REVENUE DEBIT

## 2018-09-10 ENCOUNTER — HOME CARE VISIT (OUTPATIENT)
Dept: SCHEDULING | Facility: HOME HEALTH | Age: 53
End: 2018-09-10
Payer: MEDICARE

## 2018-09-10 PROCEDURE — G0157 HHC PT ASSISTANT EA 15: HCPCS

## 2018-09-10 PROCEDURE — 3331090001 HH PPS REVENUE CREDIT

## 2018-09-10 PROCEDURE — 3331090002 HH PPS REVENUE DEBIT

## 2018-09-11 ENCOUNTER — HOME CARE VISIT (OUTPATIENT)
Dept: HOME HEALTH SERVICES | Facility: HOME HEALTH | Age: 53
End: 2018-09-11
Payer: MEDICARE

## 2018-09-11 ENCOUNTER — HOME CARE VISIT (OUTPATIENT)
Dept: SCHEDULING | Facility: HOME HEALTH | Age: 53
End: 2018-09-11
Payer: MEDICARE

## 2018-09-11 VITALS
DIASTOLIC BLOOD PRESSURE: 88 MMHG | OXYGEN SATURATION: 98 % | HEART RATE: 95 BPM | TEMPERATURE: 98.1 F | SYSTOLIC BLOOD PRESSURE: 152 MMHG

## 2018-09-11 VITALS
TEMPERATURE: 98.2 F | SYSTOLIC BLOOD PRESSURE: 132 MMHG | HEART RATE: 77 BPM | DIASTOLIC BLOOD PRESSURE: 80 MMHG | OXYGEN SATURATION: 97 %

## 2018-09-11 PROCEDURE — G0157 HHC PT ASSISTANT EA 15: HCPCS

## 2018-09-11 PROCEDURE — 3331090002 HH PPS REVENUE DEBIT

## 2018-09-11 PROCEDURE — 3331090001 HH PPS REVENUE CREDIT

## 2018-09-11 PROCEDURE — G0152 HHCP-SERV OF OT,EA 15 MIN: HCPCS

## 2018-09-12 ENCOUNTER — HOME CARE VISIT (OUTPATIENT)
Dept: SCHEDULING | Facility: HOME HEALTH | Age: 53
End: 2018-09-12
Payer: MEDICARE

## 2018-09-12 ENCOUNTER — HOME CARE VISIT (OUTPATIENT)
Dept: HOME HEALTH SERVICES | Facility: HOME HEALTH | Age: 53
End: 2018-09-12
Payer: MEDICARE

## 2018-09-12 VITALS
TEMPERATURE: 98.8 F | SYSTOLIC BLOOD PRESSURE: 122 MMHG | OXYGEN SATURATION: 97 % | DIASTOLIC BLOOD PRESSURE: 86 MMHG | HEART RATE: 77 BPM

## 2018-09-12 VITALS
OXYGEN SATURATION: 94 % | TEMPERATURE: 98.4 F | SYSTOLIC BLOOD PRESSURE: 138 MMHG | HEART RATE: 89 BPM | DIASTOLIC BLOOD PRESSURE: 82 MMHG

## 2018-09-12 PROCEDURE — G0157 HHC PT ASSISTANT EA 15: HCPCS

## 2018-09-12 PROCEDURE — 3331090001 HH PPS REVENUE CREDIT

## 2018-09-12 PROCEDURE — 3331090002 HH PPS REVENUE DEBIT

## 2018-09-13 PROCEDURE — 3331090002 HH PPS REVENUE DEBIT

## 2018-09-13 PROCEDURE — 3331090001 HH PPS REVENUE CREDIT

## 2018-09-14 PROCEDURE — 3331090002 HH PPS REVENUE DEBIT

## 2018-09-14 PROCEDURE — 3331090001 HH PPS REVENUE CREDIT

## 2018-09-15 PROCEDURE — 3331090002 HH PPS REVENUE DEBIT

## 2018-09-15 PROCEDURE — 3331090001 HH PPS REVENUE CREDIT

## 2018-09-16 PROCEDURE — 3331090001 HH PPS REVENUE CREDIT

## 2018-09-16 PROCEDURE — 3331090002 HH PPS REVENUE DEBIT

## 2018-09-17 ENCOUNTER — HOME CARE VISIT (OUTPATIENT)
Dept: SCHEDULING | Facility: HOME HEALTH | Age: 53
End: 2018-09-17
Payer: MEDICARE

## 2018-09-17 PROCEDURE — 3331090001 HH PPS REVENUE CREDIT

## 2018-09-17 PROCEDURE — G0152 HHCP-SERV OF OT,EA 15 MIN: HCPCS

## 2018-09-17 PROCEDURE — 3331090002 HH PPS REVENUE DEBIT

## 2018-09-18 PROCEDURE — 3331090002 HH PPS REVENUE DEBIT

## 2018-09-18 PROCEDURE — 3331090001 HH PPS REVENUE CREDIT

## 2018-09-19 PROCEDURE — 3331090002 HH PPS REVENUE DEBIT

## 2018-09-19 PROCEDURE — 3331090001 HH PPS REVENUE CREDIT

## 2018-09-20 PROCEDURE — 3331090001 HH PPS REVENUE CREDIT

## 2018-09-20 PROCEDURE — 3331090002 HH PPS REVENUE DEBIT

## 2018-09-21 PROCEDURE — 3331090002 HH PPS REVENUE DEBIT

## 2018-09-21 PROCEDURE — 3331090001 HH PPS REVENUE CREDIT

## 2018-09-22 PROCEDURE — 3331090001 HH PPS REVENUE CREDIT

## 2018-09-22 PROCEDURE — 3331090002 HH PPS REVENUE DEBIT

## 2018-09-23 PROCEDURE — 3331090002 HH PPS REVENUE DEBIT

## 2018-09-23 PROCEDURE — 3331090001 HH PPS REVENUE CREDIT

## 2018-10-08 DIAGNOSIS — E11.9 TYPE 2 DIABETES MELLITUS WITHOUT COMPLICATION, WITHOUT LONG-TERM CURRENT USE OF INSULIN (HCC): ICD-10-CM

## 2018-10-30 DIAGNOSIS — R05.9 COUGH: ICD-10-CM

## 2018-10-30 RX ORDER — MONTELUKAST SODIUM 10 MG/1
TABLET ORAL
Qty: 90 TAB | Refills: 3 | Status: SHIPPED | OUTPATIENT
Start: 2018-10-30 | End: 2019-05-06 | Stop reason: SDUPTHER

## 2018-11-06 ENCOUNTER — OFFICE VISIT (OUTPATIENT)
Dept: INTERNAL MEDICINE CLINIC | Age: 53
End: 2018-11-06

## 2018-11-06 VITALS
DIASTOLIC BLOOD PRESSURE: 83 MMHG | HEART RATE: 81 BPM | BODY MASS INDEX: 28.34 KG/M2 | RESPIRATION RATE: 18 BRPM | OXYGEN SATURATION: 97 % | HEIGHT: 68 IN | SYSTOLIC BLOOD PRESSURE: 120 MMHG | WEIGHT: 187 LBS | TEMPERATURE: 98.1 F

## 2018-11-06 DIAGNOSIS — Z23 ENCOUNTER FOR IMMUNIZATION: ICD-10-CM

## 2018-11-06 DIAGNOSIS — E78.00 HYPERCHOLESTEREMIA: ICD-10-CM

## 2018-11-06 DIAGNOSIS — E11.8 TYPE 2 DIABETES MELLITUS WITH COMPLICATION, WITHOUT LONG-TERM CURRENT USE OF INSULIN (HCC): Primary | ICD-10-CM

## 2018-11-06 DIAGNOSIS — Z12.11 ENCOUNTER FOR SCREENING COLONOSCOPY: ICD-10-CM

## 2018-11-06 DIAGNOSIS — N39.0 RECURRENT UTI: ICD-10-CM

## 2018-11-06 DIAGNOSIS — E66.3 OVERWEIGHT (BMI 25.0-29.9): ICD-10-CM

## 2018-11-06 NOTE — PROGRESS NOTES
Chief Complaint Patient presents with  Diabetes  Hypertension  Depression 1. Have you been to the ER, urgent care clinic since your last visit? Hospitalized since your last visit? No 
 
2. Have you seen or consulted any other health care providers outside of the [address] [place] [address] since your last visit? Include any pap smears or colon screening. No  
 
Pt denies any symptoms , reactions or allergies that would exclude them from being immunized today. Risks and adverse reactions were discussed and the VIS was given to them. All questions were addressed. He was observed for 15 min post injection. There were no reactions observed.  
 
Liliana Gatica LPN

## 2018-11-06 NOTE — PROGRESS NOTES
HISTORY OF PRESENT ILLNESS Tamy Edwards is a 48 y.o. female. HPI Tamy Edwards is here for follow up on diabetes, HTN and hypercholesterolemia. Since her last visit, she underwent back surgery. However, several weeks after the surgery, she tripped and fell in her bedroom and suffered a fractured left distal radius which required surgical repair. I had scheduled her for a stress echo, but she had not gone due to these other issues she had going on. She has no sx. I had ordered it due to abnormalities on EKG. She states she is due for a colonoscopy. She believes she had one, but can't recall. Review of Systems Constitutional: Negative. HENT: Negative. Eyes: Negative. Respiratory: Negative. Cardiovascular: Negative. Gastrointestinal: Negative. Musculoskeletal: Positive for joint pain (left wrist) and myalgias (right leg - also injured with fall, states she has torn ligaments). Neurological: Negative. Physical Exam  
Constitutional: She is oriented to person, place, and time. She appears well-developed and well-nourished. No distress. HENT:  
Head: Normocephalic and atraumatic. Eyes: Conjunctivae are normal.  
Cardiovascular: Normal rate and regular rhythm. No murmur heard. Pulmonary/Chest: Effort normal and breath sounds normal. She has no wheezes. Musculoskeletal: She exhibits no edema. Neurological: She is alert and oriented to person, place, and time. Visit Vitals /83 Pulse 81 Temp 98.1 °F (36.7 °C) (Oral) Resp 18 Ht 5' 7.5\" (1.715 m) Wt 187 lb (84.8 kg) SpO2 97% BMI 28.86 kg/m² Wt Readings from Last 3 Encounters:  
11/06/18 187 lb (84.8 kg) 08/29/18 188 lb (85.3 kg) 08/06/18 191 lb (86.6 kg) ASSESSMENT and PLAN 
  ICD-10-CM ICD-9-CM 1. Type 2 diabetes mellitus with complication, without long-term current use of insulin ([facility]) E11.8 250.90 COLLECTION VENOUS BLOOD,VENIPUNCTURE    HEMOGLOBIN A1C WITH EAG  
 METABOLIC PANEL, COMPREHENSIVE 2. Recurrent UTI N39.0 599.0 CULTURE, URINE 3. Encounter for immunization Z23 V03.89 INFLUENZA VIRUS VAC QUAD,SPLIT,PRESV FREE SYRINGE IM 4. Hypercholesteremia E78.00 272.0 COLLECTION VENOUS BLOOD,VENIPUNCTURE  
   LIPID PANEL 5. Encounter for screening colonoscopy Z12.11 V76.51 REFERRAL TO GASTROENTEROLOGY 6. Overweight (BMI 25.0-29. 9) E66.3 278.02 Recommend increase exercise, diet and weight reduction Pt verbalized understanding of their condition and diagnoses, treatment plan,  as well as side effects of any new medications prescribed.

## 2018-11-07 LAB
ALBUMIN SERPL-MCNC: 4.2 G/DL (ref 3.5–5.5)
ALBUMIN/GLOB SERPL: 1.4 {RATIO} (ref 1.2–2.2)
ALP SERPL-CCNC: 122 IU/L (ref 39–117)
ALT SERPL-CCNC: 6 IU/L (ref 0–32)
AST SERPL-CCNC: 12 IU/L (ref 0–40)
BILIRUB SERPL-MCNC: <0.2 MG/DL (ref 0–1.2)
BUN SERPL-MCNC: 10 MG/DL (ref 6–24)
BUN/CREAT SERPL: 13 (ref 9–23)
CALCIUM SERPL-MCNC: 9.5 MG/DL (ref 8.7–10.2)
CHLORIDE SERPL-SCNC: 101 MMOL/L (ref 96–106)
CHOLEST SERPL-MCNC: 224 MG/DL (ref 100–199)
CO2 SERPL-SCNC: 22 MMOL/L (ref 20–29)
CREAT SERPL-MCNC: 0.8 MG/DL (ref 0.57–1)
EST. AVERAGE GLUCOSE BLD GHB EST-MCNC: 131 MG/DL
GLOBULIN SER CALC-MCNC: 3.1 G/DL (ref 1.5–4.5)
GLUCOSE SERPL-MCNC: 123 MG/DL (ref 65–99)
HBA1C MFR BLD: 6.2 % (ref 4.8–5.6)
HDLC SERPL-MCNC: 61 MG/DL
LDLC SERPL CALC-MCNC: 136 MG/DL (ref 0–99)
POTASSIUM SERPL-SCNC: 3.8 MMOL/L (ref 3.5–5.2)
PROT SERPL-MCNC: 7.3 G/DL (ref 6–8.5)
SODIUM SERPL-SCNC: 142 MMOL/L (ref 134–144)
TRIGL SERPL-MCNC: 136 MG/DL (ref 0–149)
VLDLC SERPL CALC-MCNC: 27 MG/DL (ref 5–40)

## 2018-11-08 LAB — BACTERIA UR CULT: ABNORMAL

## 2018-11-12 ENCOUNTER — TELEPHONE (OUTPATIENT)
Dept: INTERNAL MEDICINE CLINIC | Age: 53
End: 2018-11-12

## 2018-11-13 RX ORDER — AMOXICILLIN AND CLAVULANATE POTASSIUM 875; 125 MG/1; MG/1
1 TABLET, FILM COATED ORAL EVERY 12 HOURS
Qty: 20 TAB | Refills: 0 | Status: SHIPPED | OUTPATIENT
Start: 2018-11-13 | End: 2018-12-06

## 2018-11-13 RX ORDER — ATORVASTATIN CALCIUM 20 MG/1
20 TABLET, FILM COATED ORAL DAILY
Qty: 90 TAB | Refills: 1 | Status: SHIPPED | OUTPATIENT
Start: 2018-11-13 | End: 2019-05-06 | Stop reason: SDUPTHER

## 2018-11-13 NOTE — TELEPHONE ENCOUNTER
Pt aware of below message. She has an appt coming up with urology and she is not taking cholesterol medication. Please send cholesterol medication in and abx.

## 2018-11-13 NOTE — TELEPHONE ENCOUNTER
Her urine shows bacteria growth again. She should follow up with urology and I will send in abx. Her cholesterol is slightly higher than last visiit. I thought she was on cholesterol medication, but I don't see it on her list unless I overlooked it. She should be on cholesterol medication and I can send some in.

## 2018-12-19 DIAGNOSIS — E11.9 TYPE 2 DIABETES MELLITUS WITHOUT COMPLICATION, WITHOUT LONG-TERM CURRENT USE OF INSULIN (HCC): ICD-10-CM

## 2019-01-16 ENCOUNTER — DOCUMENTATION ONLY (OUTPATIENT)
Dept: INTERNAL MEDICINE CLINIC | Age: 54
End: 2019-01-16

## 2019-01-16 RX ORDER — INSULIN PUMP SYRINGE, 3 ML
EACH MISCELLANEOUS
Qty: 1 KIT | Refills: 0 | Status: SHIPPED | OUTPATIENT
Start: 2019-01-16 | End: 2019-04-16 | Stop reason: SDUPTHER

## 2019-01-16 RX ORDER — LANCING DEVICE/LANCETS
KIT MISCELLANEOUS
Qty: 1 KIT | Refills: 3 | Status: SHIPPED | OUTPATIENT
Start: 2019-01-16 | End: 2019-04-16 | Stop reason: SDUPTHER

## 2019-01-16 NOTE — TELEPHONE ENCOUNTER
Insurance company called back and they want her to have the [facility] that she does not have to do finger sticks with

## 2019-01-16 NOTE — PROGRESS NOTES
Dennise from [facility] wanted us to know that they are doing care coordination with this patient on Jan 23rd at 9:30 am.  Kimmy Reed is who will be with the patient [phone] meeting [facility]([phone]) if we are able to dial in.

## 2019-01-18 ENCOUNTER — DOCUMENTATION ONLY (OUTPATIENT)
Dept: INTERNAL MEDICINE CLINIC | Age: 54
End: 2019-01-18

## 2019-02-18 DIAGNOSIS — I10 ESSENTIAL HYPERTENSION, BENIGN: ICD-10-CM

## 2019-02-18 RX ORDER — LISINOPRIL 5 MG/1
5 TABLET ORAL DAILY
Qty: 90 TAB | Refills: 0 | Status: SHIPPED | OUTPATIENT
Start: 2019-02-18 | End: 2019-05-06 | Stop reason: SDUPTHER

## 2019-02-18 RX ORDER — POTASSIUM CHLORIDE 750 MG/1
10 TABLET, EXTENDED RELEASE ORAL DAILY
Qty: 90 TAB | Refills: 0 | Status: SHIPPED | OUTPATIENT
Start: 2019-02-18 | End: 2019-05-06 | Stop reason: SDUPTHER

## 2019-02-18 RX ORDER — HYDROCHLOROTHIAZIDE 25 MG/1
TABLET ORAL
Qty: 90 TAB | Refills: 0 | Status: SHIPPED | OUTPATIENT
Start: 2019-02-18 | End: 2019-05-06 | Stop reason: SDUPTHER

## 2019-04-08 DIAGNOSIS — E11.9 TYPE 2 DIABETES MELLITUS WITHOUT COMPLICATION, WITHOUT LONG-TERM CURRENT USE OF INSULIN (HCC): ICD-10-CM

## 2019-04-11 NOTE — TELEPHONE ENCOUNTER
Insurance called stating her monitoring kit will not go through part B or Part D of her insurance. But they stated that if you put a DX code on the Rx that it should help it go through and if that doesn't work then we can of a Prior Authorization.

## 2019-04-16 RX ORDER — LANCING DEVICE/LANCETS
KIT MISCELLANEOUS
Qty: 100 KIT | Refills: 3 | Status: SHIPPED | OUTPATIENT
Start: 2019-04-16

## 2019-04-16 RX ORDER — INSULIN PUMP SYRINGE, 3 ML
EACH MISCELLANEOUS
Qty: 1 KIT | Refills: 0 | Status: SHIPPED | OUTPATIENT
Start: 2019-04-16

## 2019-04-18 ENCOUNTER — TELEPHONE (OUTPATIENT)
Dept: INTERNAL MEDICINE CLINIC | Age: 54
End: 2019-04-18

## 2019-04-18 NOTE — TELEPHONE ENCOUNTER
Jennifer with [employer] called to inform us that pt has Care Coordinator named Raeann Harper that is meeting with the patient on 04/24/19 @ [place] [facility] is requesting that the nurse or pj be involved in the meeting between the pt and rahul via phone. If you could please call [phone] option 2, to inform them if you can join or not.

## 2019-05-06 ENCOUNTER — DOCUMENTATION ONLY (OUTPATIENT)
Dept: INTERNAL MEDICINE CLINIC | Age: 54
End: 2019-05-06

## 2019-05-06 ENCOUNTER — DOCUMENTATION ONLY (OUTPATIENT)
Dept: FAMILY MEDICINE CLINIC | Age: 54
End: 2019-05-06

## 2019-05-06 ENCOUNTER — OFFICE VISIT (OUTPATIENT)
Dept: INTERNAL MEDICINE CLINIC | Age: 54
End: 2019-05-06

## 2019-05-06 VITALS
RESPIRATION RATE: 18 BRPM | HEART RATE: 76 BPM | HEIGHT: 68 IN | OXYGEN SATURATION: 98 % | TEMPERATURE: 98.4 F | DIASTOLIC BLOOD PRESSURE: 85 MMHG | SYSTOLIC BLOOD PRESSURE: 125 MMHG | WEIGHT: 203 LBS | BODY MASS INDEX: 30.77 KG/M2

## 2019-05-06 DIAGNOSIS — Z00.00 MEDICARE ANNUAL WELLNESS VISIT, SUBSEQUENT: Primary | ICD-10-CM

## 2019-05-06 DIAGNOSIS — R82.90 FOUL SMELLING URINE: ICD-10-CM

## 2019-05-06 DIAGNOSIS — D35.02 ADENOMA OF LEFT ADRENAL GLAND: ICD-10-CM

## 2019-05-06 DIAGNOSIS — F32.A DEPRESSION, UNSPECIFIED DEPRESSION TYPE: ICD-10-CM

## 2019-05-06 DIAGNOSIS — R05.9 COUGH: ICD-10-CM

## 2019-05-06 DIAGNOSIS — I10 ESSENTIAL HYPERTENSION, BENIGN: ICD-10-CM

## 2019-05-06 DIAGNOSIS — F31.60 BIPOLAR AFFECTIVE DISORDER, CURRENT EPISODE MIXED, CURRENT EPISODE SEVERITY UNSPECIFIED (HCC): ICD-10-CM

## 2019-05-06 DIAGNOSIS — E11.9 TYPE 2 DIABETES MELLITUS WITHOUT COMPLICATION, WITHOUT LONG-TERM CURRENT USE OF INSULIN (HCC): ICD-10-CM

## 2019-05-06 RX ORDER — LISINOPRIL 5 MG/1
5 TABLET ORAL DAILY
Qty: 90 TAB | Refills: 1 | Status: SHIPPED | OUTPATIENT
Start: 2019-05-06 | End: 2019-11-06 | Stop reason: SDUPTHER

## 2019-05-06 RX ORDER — POTASSIUM CHLORIDE 750 MG/1
10 TABLET, EXTENDED RELEASE ORAL DAILY
Qty: 90 TAB | Refills: 1 | Status: SHIPPED | OUTPATIENT
Start: 2019-05-06 | End: 2019-11-06 | Stop reason: SDUPTHER

## 2019-05-06 RX ORDER — ATORVASTATIN CALCIUM 20 MG/1
20 TABLET, FILM COATED ORAL DAILY
Qty: 90 TAB | Refills: 1 | Status: SHIPPED | OUTPATIENT
Start: 2019-05-06 | End: 2019-11-06 | Stop reason: SDUPTHER

## 2019-05-06 RX ORDER — HYDROCHLOROTHIAZIDE 25 MG/1
TABLET ORAL
Qty: 90 TAB | Refills: 1 | Status: SHIPPED | OUTPATIENT
Start: 2019-05-06 | End: 2019-11-06 | Stop reason: SDUPTHER

## 2019-05-06 RX ORDER — ESTRADIOL 0.1 MG/G
CREAM VAGINAL
Qty: 42.5 G | Refills: 1 | Status: SHIPPED | OUTPATIENT
Start: 2019-05-06 | End: 2019-11-06 | Stop reason: SDUPTHER

## 2019-05-06 RX ORDER — MONTELUKAST SODIUM 10 MG/1
TABLET ORAL
Qty: 90 TAB | Refills: 1 | Status: SHIPPED | OUTPATIENT
Start: 2019-05-06 | End: 2019-11-06 | Stop reason: SDUPTHER

## 2019-05-06 NOTE — PATIENT INSTRUCTIONS
High Blood Pressure: Care Instructions  Overview    It's normal for blood pressure to go up and down throughout the day. But if it stays up, you have high blood pressure. Another name for high blood pressure is hypertension. Despite what a lot of people think, high blood pressure usually doesn't cause headaches or make you feel dizzy or lightheaded. It usually has no symptoms. But it does increase your risk of stroke, heart attack, and other problems. You and your doctor will talk about your risks of these problems based on your blood pressure. Your doctor will give you a goal for your blood pressure. Your goal will be based on your health and your age. Lifestyle changes, such as eating healthy and being active, are always important to help lower blood pressure. You might also take medicine to reach your blood pressure goal.  Follow-up care is a key part of your treatment and safety. Be sure to make and go to all appointments, and call your doctor if you are having problems. It's also a good idea to know your test results and keep a list of the medicines you take. How can you care for yourself at home? Medical treatment  · If you stop taking your medicine, your blood pressure will go back up. You may take one or more types of medicine to lower your blood pressure. Be safe with medicines. Take your medicine exactly as prescribed. Call your doctor if you think you are having a problem with your medicine. · Talk to your doctor before you start taking aspirin every day. Aspirin can help certain people lower their risk of a heart attack or stroke. But taking aspirin isn't right for everyone, because it can cause serious bleeding. · See your doctor regularly. You may need to see the doctor more often at first or until your blood pressure comes down. · If you are taking blood pressure medicine, talk to your doctor before you take decongestants or anti-inflammatory medicine, such as ibuprofen.  Some of these medicines can raise blood pressure. · Learn how to check your blood pressure at home. Lifestyle changes  · Stay at a healthy weight. This is especially important if you put on weight around the waist. Losing even 10 pounds can help you lower your blood pressure. · If your doctor recommends it, get more exercise. Walking is a good choice. Bit by bit, increase the amount you walk every day. Try for at least 30 minutes on most days of the week. You also may want to swim, bike, or do other activities. · Avoid or limit alcohol. Talk to your doctor about whether you can drink any alcohol. · Try to limit how much sodium you eat to less than 2,300 milligrams (mg) a day. Your doctor may ask you to try to eat less than 1,500 mg a day. · Eat plenty of fruits (such as bananas and oranges), vegetables, legumes, whole grains, and low-fat dairy products. · Lower the amount of saturated fat in your diet. Saturated fat is found in animal products such as milk, cheese, and meat. Limiting these foods may help you lose weight and also lower your risk for heart disease. · Do not smoke. Smoking increases your risk for heart attack and stroke. If you need help quitting, talk to your doctor about stop-smoking programs and medicines. These can increase your chances of quitting for good. When should you call for help? Call 911 anytime you think you may need emergency care. This may mean having symptoms that suggest that your blood pressure is causing a serious heart or blood vessel problem. Your blood pressure may be over 180/120.   For example, call 911 if:    · You have symptoms of a heart attack. These may include:  ? Chest pain or pressure, or a strange feeling in the chest.  ? Sweating. ? Shortness of breath. ? Nausea or vomiting. ? Pain, pressure, or a strange feeling in the back, neck, jaw, or upper belly or in one or both shoulders or arms. ? Lightheadedness or sudden weakness.   ? A fast or irregular heartbeat.     · You have symptoms of a stroke. These may include:  ? Sudden numbness, tingling, weakness, or loss of movement in your face, arm, or leg, especially on only one side of your body. ? Sudden vision changes. ? Sudden trouble speaking. ? Sudden confusion or trouble understanding simple statements. ? Sudden problems with walking or balance. ? A sudden, severe headache that is different from past headaches.     · You have severe back or belly pain.    Do not wait until your blood pressure comes down on its own. Get help right away.   Call your doctor now or seek immediate care if:    · Your blood pressure is much higher than normal (such as 180/120 or higher), but you don't have symptoms.     · You think high blood pressure is causing symptoms, such as:  ? Severe headache.  ? Blurry vision.    Watch closely for changes in your health, and be sure to contact your doctor if:    · Your blood pressure measures higher than your doctor recommends at least 2 times. That means the top number is higher or the bottom number is higher, or both.     · You think you may be having side effects from your blood pressure medicine. Where can you learn more? Go to http://marisela-darryn.info/. Enter R143 in the search box to learn more about \"High Blood Pressure: Care Instructions. \"  Current as of: July 22, 2018  Content Version: 11.9  © 8537-1416 [employer]. Care instructions adapted under license by [employer] (which disclaims liability or warranty for this information). If you have questions about a medical condition or this instruction, always ask your healthcare professional. [place] [address] any warranty or liability for your use of this information.

## 2019-05-06 NOTE — PROGRESS NOTES
HISTORY OF PRESENT ILLNESS  Andrew Valdovinos is a 48 y.o. female. HPI Andrew Valdovinos is here for follow up on diabetes, HTN and hypercholesterolemia. CT ABDOMEN/PELVIS W/O CONTRAST4/9/2018  [facility]  Result Impression   Impression:      Small adrenal adenoma(s) on the left. Adrenal glands: The right appears normal.  The left is bilobed and enlarged possibly with two nodules overall measuring 2.3 x 0.9 cm.  The density is near that of water six Hounsfield units. Review of Systems   Eyes: Negative. Cardiovascular: Negative. Musculoskeletal: Positive for joint pain (right ankle from injury, seeing Dr. Ignacio Fisher). Neurological: Negative for dizziness and headaches. Physical Exam   Constitutional: She is oriented to person, place, and time. She appears well-developed and well-nourished. No distress. HENT:   Head: Normocephalic and atraumatic. Eyes: Conjunctivae are normal.   Cardiovascular: Normal rate and regular rhythm. No murmur heard. Pulmonary/Chest: Effort normal and breath sounds normal. She has no wheezes. Musculoskeletal: She exhibits no edema. Neurological: She is alert and oriented to person, place, and time. Visit Vitals  /85 (BP 1 Location: Left arm, BP Patient Position: Sitting)   Pulse 76   Temp 98.4 °F (36.9 °C) (Oral)   Resp 18   Ht 5' 7.5\" (1.715 m)   Wt 203 lb (92.1 kg)   SpO2 98%   BMI 31.33 kg/m²     Wt Readings from Last 3 Encounters:   05/06/19 203 lb (92.1 kg)   03/12/19 202 lb (91.6 kg)   12/06/18 189 lb (85.7 kg)       ASSESSMENT and PLAN    ICD-10-CM ICD-9-CM    1.  Type 2 diabetes mellitus without complication, without long-term current use of insulin (HCC) E11.9 250.00 COLLECTION VENOUS BLOOD,VENIPUNCTURE      METABOLIC PANEL, COMPREHENSIVE      LIPID PANEL      HEMOGLOBIN A1C WITH EAG      TSH 3RD GENERATION       DIABETES FOOT EXAM      MICROALBUMIN, UR, RAND W/ MICROALB/CREAT RATIO      SITagliptin-metFORMIN (JANUMET)  mg per tablet 2. Essential hypertension, benign I10 401.1 lisinopril (PRINIVIL, ZESTRIL) 5 mg tablet      hydroCHLOROthiazide (HYDRODIURIL) 25 mg tablet   3. Cough R05 786.2 montelukast (SINGULAIR) 10 mg tablet   4. Bipolar affective disorder, current episode mixed, current episode severity unspecified ([facility] F31.60 296.60    5.  Depression, unspecified depression type F32.9 311

## 2019-05-06 NOTE — PROGRESS NOTES
Medicare Wellness Visit  Melva Russo is a 48 y.o. female and presents for annual Medicare Wellness Visit. Problem List: Reviewed with patient and discussed risk factors. Patient Active Problem List   Diagnosis Code    Essential hypertension, benign I10    Overactive bladder N32.81    Bipolar disorder ([address] F31.9    Chronic back pain M54.9, G89.29    Glaucoma H40.9    UTI (lower urinary tract infection) N39.0    Psychotic disorder (HCC) F29    Hepatitis C B19.20    GERD (gastroesophageal reflux disease) K21.9    Depression F32.9    Chronic pain G89.29    Asthma J45.909    Plantar verruca B07.0    Leg peripheral nerve injury S84.90XA    Recurrent UTI N39.0    Type 2 diabetes mellitus with diabetic neuropathy (HCC) E11.40    Pseudoarthrosis of lumbar spine S32.009K    Pseudarthrosis following spinal fusion M96.0       Current medical providers:  Patient Care Team:  Nata Baron PA-C as PCP - General (Family Practice)  Dayanna Roberts RN as Nurse Navigator    PSH: Reviewed with patient  Past Surgical History:   Procedure Laterality Date    HX CHOLECYSTECTOMY      HX HEENT      EYE SX    HX LUMBAR FUSION  2018    Dr. Robert Randolph HX ORTHOPAEDIC  2008    [id number]    HX ORTHOPAEDIC  3/2015    right foot decompression peroneal nerve    HX WRIST FRACTURE [place]  2018    distal radius, left.  Dr. Avery Smyth        SH: Reviewed with patient  Social History     Tobacco Use    Smoking status: Former Smoker     Packs/day: 1.00     Years: 35.00     Pack years: 35.00     Last attempt to quit: 2018     Years since quittin.8    Smokeless tobacco: Current User    Tobacco comment: Pt is using a vape cigarette   Substance Use Topics    Alcohol use: No    Drug use: No     Comment: recovering addict 12yrs       FH: Reviewed with patient  Family History   Problem Relation Age of Onset   [facility] Mother     Lung Disease Mother     No Known Problems Father        Medications/Allergies: Reviewed with patient  Current Outpatient Medications on File Prior to Visit   Medication Sig Dispense Refill    Lancing Device with Lancets kit 1 lancing device with 100 lancets testing blood sugar daily ([employer] [facility]). DX E11.9 100 Kit 3    Blood-Glucose Meter (BLOOD GLUCOSE MONITORING) monitoring kit [employer] [facility] for testing blood sugar daily DX E11.9 1 Kit 0    glucose blood VI test strips (BLOOD GLUCOSE TEST) strip Daily blood sugar testing ([employer]) DX E11.9 100 Strip 3    umeclidinium bromide (INCRUSE ELLIPTA IN) Take 62.5 mcg by inhalation daily. 1 puff daily      omeprazole (PRILOSEC) 20 mg capsule Take 20 mg by mouth daily.  PROAIR RESPICLICK 90 mcg/actuation aepb INL 2 PFS PO Q 6 H PRF WHZ  5    INCRUSE ELLIPTA 62.5 mcg/actuation inhaler INL 1 PUFF PO QD  4    albuterol (VENTOLIN HFA) 90 mcg/actuation inhaler Take 2 Puffs by inhalation every six (6) hours as needed for Wheezing. 1 Inhaler 5    fluticasone-vilanterol (BREO ELLIPTA) 100-25 mcg/dose inhaler INHALE ONE PUFF BY MOUTH DAILY 1 Inhaler 5    glucose blood VI test strips (BLOOD GLUCOSE TEST) strip True resultTest blood glucose daily 100 Strip 1    Lancets misc True results lancets Test glucose daily 100 Each 11    ABILIFY 30 mg tablet Take 30 mg by mouth daily.  escitalopram (LEXAPRO) 20 mg tablet Take 20 mg by mouth daily.  gabapentin (NEURONTIN) 600 mg tablet Take 600 mg by mouth two (2) times a day.  diazepam (VALIUM) 10 mg tablet Take 10 mg by mouth every six (6) hours as needed for Sleep.  tapentadol (NUCYNTA) 100 mg tablet Take 100 mg by mouth every six (6) hours as needed.  HYDROcodone-acetaminophen (NORCO) 5-325 mg per tablet TK 1 T PO  Q 4 H PRN P FOR UP TO 5 DAYS  0    fluticasone-vilanterol (BREO ELLIPTA) 100-25 mcg/dose inhaler Take 1 Puff by inhalation daily.       TRUEPLUS LANCETS 30 gauge misc TEST GLUCOSE ONCE  DAILY  11     No current facility-administered medications on file prior to visit. Allergies   Allergen Reactions    Aspirin Hives    Bactrim [Sulfamethoprim Ds] Swelling       Objective:  Visit Vitals  /85 (BP 1 Location: Left arm, BP Patient Position: Sitting)   Pulse 76   Temp 98.4 °F (36.9 °C) (Oral)   Resp 18   Ht 5' 7.5\" (1.715 m)   Wt 203 lb (92.1 kg)   SpO2 98%   BMI 31.33 kg/m²    Body mass index is 31.33 kg/m². Assessment of cognitive impairment: Alert and oriented x 3    Depression Screen:   3 most recent PHQ Screens 9/10/2015   PHQ Not Done Active Diagnosis of Depression or Bipolar Disorder       Fall Risk Assessment:    Fall Risk Assessment, last 12 mths 9/10/2015   Able to walk? Yes   Fall in past 12 months? No     Pt does not drink alcohol    Functional Ability:   Does the patient exhibit a steady gait? yes   How long did it take the patient to get up and walk from a sitting position? <10 seconds   Is the patient self reliant?  (ie can do own laundry, meals, household chores)  yes     Does the patient handle his/her own medications? yes     Does the patient handle his/her own money? yes     Is the patients home safe (ie good lighting, handrails on stairs and bath, etc.)? yes     Did you notice or did patient express any hearing difficulties? no     Did you notice or did patient express any vision difficulties?   no     Were distance and reading eye charts used? yes       Advance Care Planning:   Patient was offered the opportunity to discuss advance care planning:  yes     Does patient have an Advance Directive:  no   If no, did you provide information on Caring Connections?   yes       Plan:      Orders Placed This Encounter    METABOLIC PANEL, COMPREHENSIVE    LIPID PANEL    HEMOGLOBIN A1C WITH EAG    TSH 3RD GENERATION    MICROALBUMIN, UR, RAND W/ MICROALB/CREAT RATIO     DIABETES FOOT EXAM    COLLECTION VENOUS BLOOD,VENIPUNCTURE    SITagliptin-metFORMIN (JANUMET)  mg per tablet    lisinopril (PRINIVIL, ZESTRIL) 5 mg tablet    hydroCHLOROthiazide (HYDRODIURIL) 25 mg tablet    potassium chloride (KLOR-CON) 10 mEq tablet    atorvastatin (LIPITOR) 20 mg tablet    montelukast (SINGULAIR) 10 mg tablet    estradiol (ESTRACE) 0.01 % (0.1 mg/gram) vaginal cream       Health Maintenance   Topic Date Due    Shingrix Vaccine Age 50> (1 of 2) 06/05/2015    FOOT EXAM Q1  02/13/2019    HEMOGLOBIN A1C Q6M  05/06/2019    MICROALBUMIN Q1  05/14/2019    MEDICARE YEARLY EXAM  05/15/2019    PAP AKA CERVICAL CYTOLOGY  05/19/2019    Influenza Age 9 to Adult  08/01/2019    BREAST CANCER SCRN MAMMOGRAM  09/27/2019    LIPID PANEL Q1  11/06/2019    EYE EXAM RETINAL OR DILATED  05/09/2020    COLONOSCOPY  05/14/2022    DTaP/Tdap/Td series (2 - Td) 07/14/2026    Pneumococcal 0-64 years  Completed       *Patient verbalized understanding and agreement with the plan. A copy of the After Visit Summary with personalized health plan was given to the patient today.

## 2019-05-06 NOTE — PROGRESS NOTES
Chief Complaint   Patient presents with    Diabetes    Cholesterol Problem    Hypertension    Annual Wellness Visit     1. Have you been to the ER, urgent care clinic since your last visit? Hospitalized since your last visit? No    2. Have you seen or consulted any other health care providers outside of the [address] [place] [address] [facility] since your last visit? Include any pap smears or colon screening.  No     ADL Assessment 5/6/2019   Feeding yourself No Help Needed   Getting from bed to chair No Help Needed   Getting dressed No Help Needed   Bathing or showering No Help Needed   Walk across the room (includes cane/walker) No Help Needed   Using the telphone No Help Needed   Taking your medications No Help Needed   Preparing meals No Help Needed   Managing money (expenses/bills) No Help Needed   Moderately strenuous housework (laundry) No Help Needed   Shopping for personal items (toiletries/medicines) No Help Needed   Shopping for groceries No Help Needed   Driving Help Needed   Climbing a flight of stairs No Help Needed   Getting to places beyond walking distances Help Needed

## 2019-05-07 LAB
ALBUMIN SERPL-MCNC: 4.4 G/DL (ref 3.5–5.5)
ALBUMIN/CREAT UR: 5.2 MG/G CREAT (ref 0–30)
ALBUMIN/GLOB SERPL: 1.3 {RATIO} (ref 1.2–2.2)
ALP SERPL-CCNC: 95 IU/L (ref 39–117)
ALT SERPL-CCNC: 13 IU/L (ref 0–32)
AST SERPL-CCNC: 16 IU/L (ref 0–40)
BILIRUB SERPL-MCNC: 0.3 MG/DL (ref 0–1.2)
BUN SERPL-MCNC: 11 MG/DL (ref 6–24)
BUN/CREAT SERPL: 14 (ref 9–23)
CALCIUM SERPL-MCNC: 9.5 MG/DL (ref 8.7–10.2)
CHLORIDE SERPL-SCNC: 101 MMOL/L (ref 96–106)
CHOLEST SERPL-MCNC: 207 MG/DL (ref 100–199)
CO2 SERPL-SCNC: 26 MMOL/L (ref 20–29)
CREAT SERPL-MCNC: 0.76 MG/DL (ref 0.57–1)
CREAT UR-MCNC: 72.9 MG/DL
EST. AVERAGE GLUCOSE BLD GHB EST-MCNC: 146 MG/DL
GLOBULIN SER CALC-MCNC: 3.3 G/DL (ref 1.5–4.5)
GLUCOSE SERPL-MCNC: 118 MG/DL (ref 65–99)
HBA1C MFR BLD: 6.7 % (ref 4.8–5.6)
HDLC SERPL-MCNC: 53 MG/DL
LDLC SERPL CALC-MCNC: 129 MG/DL (ref 0–99)
MICROALBUMIN UR-MCNC: 3.8 UG/ML
POTASSIUM SERPL-SCNC: 4.1 MMOL/L (ref 3.5–5.2)
PROT SERPL-MCNC: 7.7 G/DL (ref 6–8.5)
SODIUM SERPL-SCNC: 142 MMOL/L (ref 134–144)
TRIGL SERPL-MCNC: 124 MG/DL (ref 0–149)
TSH SERPL DL<=0.005 MIU/L-ACNC: 1.65 UIU/ML (ref 0.45–4.5)
VLDLC SERPL CALC-MCNC: 25 MG/DL (ref 5–40)

## 2019-05-08 ENCOUNTER — DOCUMENTATION ONLY (OUTPATIENT)
Dept: INTERNAL MEDICINE CLINIC | Age: 54
End: 2019-05-08

## 2019-05-09 LAB — BACTERIA UR CULT: ABNORMAL

## 2019-05-13 ENCOUNTER — TELEPHONE (OUTPATIENT)
Dept: INTERNAL MEDICINE CLINIC | Age: 54
End: 2019-05-13

## 2019-05-13 RX ORDER — CIPROFLOXACIN 500 MG/1
500 TABLET ORAL 2 TIMES DAILY
Qty: 14 TAB | Refills: 0 | Status: SHIPPED | OUTPATIENT
Start: 2019-05-13 | End: 2019-08-08 | Stop reason: SDUPTHER

## 2019-06-12 ENCOUNTER — OFFICE VISIT (OUTPATIENT)
Dept: INTERNAL MEDICINE CLINIC | Age: 54
End: 2019-06-12

## 2019-06-12 ENCOUNTER — HOSPITAL ENCOUNTER (OUTPATIENT)
Dept: LAB | Age: 54
Discharge: HOME | End: 2019-06-12
Payer: MEDICARE

## 2019-06-12 VITALS
WEIGHT: 203 LBS | SYSTOLIC BLOOD PRESSURE: 120 MMHG | BODY MASS INDEX: 30.77 KG/M2 | HEIGHT: 68 IN | TEMPERATURE: 98.1 F | OXYGEN SATURATION: 98 % | RESPIRATION RATE: 18 BRPM | HEART RATE: 77 BPM | DIASTOLIC BLOOD PRESSURE: 79 MMHG

## 2019-06-12 DIAGNOSIS — Z01.818 PRE-OP EXAMINATION: ICD-10-CM

## 2019-06-12 DIAGNOSIS — M76.71 PERONEAL TENDINITIS, RIGHT: Primary | ICD-10-CM

## 2019-06-12 DIAGNOSIS — D64.9 LOW HEMOGLOBIN: ICD-10-CM

## 2019-06-12 DIAGNOSIS — S93.411D SPRAIN OF CALCANEOFIBULAR LIGAMENT OF RIGHT ANKLE, SUBSEQUENT ENCOUNTER: ICD-10-CM

## 2019-06-12 LAB
IRON SATN MFR SERPL: 15 %
IRON SERPL-MCNC: 58 UG/DL (ref 50–175)
TIBC SERPL-MCNC: 383 UG/DL (ref 250–450)

## 2019-06-12 PROCEDURE — 83540 ASSAY OF IRON: CPT

## 2019-06-12 PROCEDURE — 36415 COLL VENOUS BLD VENIPUNCTURE: CPT

## 2019-06-12 NOTE — PROGRESS NOTES
HISTORY OF PRESENT ILLNESS  Faustino Sandifer is a 47 y.o. female. HPI Ms. Nicholas Wilson is here for a pre-op for right peroneal tendonitis and sprain of the calcaneofibular ligament of the right ankle. Pre-op labs and tests were reviewed. EKG - no change  CXR Nl  CBC- slightly low hemoglobin. I will order an iron panel today. Otherwise, labs WNL. Past Medical History:   Diagnosis Date    Asthma     Avascular necrosis of bone of right hip (HCC)     Chronic pain     Depression     Diabetes (HCC)     Fracture of left distal radius 09/2018    harware fixation, Dr. Erika Londono    GERD (gastroesophageal reflux disease)     Hepatitis C     Hypertension     Liver disease     Nicotine vapor product user     Psychotic disorder [facility])      Past Surgical History:   Procedure Laterality Date    HX CHOLECYSTECTOMY      HX HEENT      EYE SX    HX LUMBAR FUSION  08/2018    Dr. Morales Abt HX ORTHOPAEDIC  2008    [id number]    HX ORTHOPAEDIC  3/2015    right foot decompression peroneal nerve    HX WRIST FRACTURE TX  09/2018    distal radius, left. Dr. Erika Londono       Current Outpatient Medications   Medication Sig    SITagliptin-metFORMIN (JANUMET)  mg per tablet Take 1 Tab by mouth two (2) times daily (with meals).  lisinopril (PRINIVIL, ZESTRIL) 5 mg tablet Take 1 Tab by mouth daily.  hydroCHLOROthiazide (HYDRODIURIL) 25 mg tablet TAKE 1 TABLET BY MOUTH DAILY    potassium chloride (KLOR-CON) 10 mEq tablet Take 1 Tab by mouth daily.  atorvastatin (LIPITOR) 20 mg tablet Take 1 Tab by mouth daily.  montelukast (SINGULAIR) 10 mg tablet TAKE 1 TABLET BY MOUTH DAILY    estradiol (ESTRACE) 0.01 % (0.1 mg/gram) vaginal cream Apply one fingertip to vulva daily    Lancing Device with Lancets kit 1 lancing device with 100 lancets testing blood sugar daily ([employer]).   DX E11.9    Blood-Glucose Meter (BLOOD GLUCOSE MONITORING) monitoring kit [employer] [facility] for testing blood sugar daily DX E11.9    glucose blood VI test strips (BLOOD GLUCOSE TEST) strip Daily blood sugar testing ([employer]) DX E11.9    umeclidinium bromide (INCRUSE ELLIPTA IN) Take 62.5 mcg by inhalation daily. 1 puff daily    omeprazole (PRILOSEC) 20 mg capsule Take 20 mg by mouth daily.  PROAIR RESPICLICK 90 mcg/actuation aepb INL 2 PFS PO Q 6 H PRF WHZ    albuterol (VENTOLIN HFA) 90 mcg/actuation inhaler Take 2 Puffs by inhalation every six (6) hours as needed for Wheezing.  fluticasone-vilanterol (BREO ELLIPTA) 100-25 mcg/dose inhaler INHALE ONE PUFF BY MOUTH DAILY    glucose blood VI test strips (BLOOD GLUCOSE TEST) strip True resultTest blood glucose daily    ABILIFY 30 mg tablet Take 30 mg by mouth daily.  escitalopram (LEXAPRO) 20 mg tablet Take 20 mg by mouth daily.  gabapentin (NEURONTIN) 600 mg tablet Take 600 mg by mouth two (2) times a day.  diazepam (VALIUM) 10 mg tablet Take 10 mg by mouth every six (6) hours as needed for Sleep.  tapentadol (NUCYNTA) 100 mg tablet Take 100 mg by mouth every six (6) hours as needed.  HYDROcodone-acetaminophen (NORCO) 5-325 mg per tablet TK 1 T PO  Q 4 H PRN P FOR UP TO 5 DAYS    TRUEPLUS LANCETS 30 gauge misc TEST GLUCOSE ONCE  DAILY    Lancets misc True results lancets Test glucose daily     No current facility-administered medications for this visit. Allergies   Allergen Reactions    Aspirin Hives    Bactrim [Sulfamethoprim Ds] Swelling     Social History     Socioeconomic History    Marital status: LEGALLY    Tobacco Use    Smoking status: Former Smoker     Packs/day: 1.00     Years: 35.00     Pack years: 35.00     Last attempt to quit: 2018     Years since quittin.9    Smokeless tobacco: Current User. Pt is vaping.  Tobacco comment: Pt is using a vape cigarette   Substance and Sexual Activity    Alcohol use: No    Drug use: No     Comment: recovering addict 12yrs         Review of Systems   Constitutional: Negative. HENT: Negative. Eyes: Negative. Respiratory: Negative for cough and shortness of breath. Cardiovascular: Negative for chest pain and leg swelling. Gastrointestinal: Negative. Musculoskeletal: Positive for back pain and joint pain (right ankle). Neurological: Negative for dizziness and headaches. Psychiatric/Behavioral:        Sees psychiatry for bipolar disorder       Physical Exam   Constitutional: She is oriented to person, place, and time. She appears well-developed and well-nourished. No distress. HENT:   Head: Normocephalic and atraumatic. Eyes: Conjunctivae are normal.   Cardiovascular: Normal rate and regular rhythm. No murmur heard. Pulmonary/Chest: Effort normal and breath sounds normal. She has no wheezes. Musculoskeletal: She exhibits no edema. Neurological: She is alert and oriented to person, place, and time. Visit Vitals  /79 (BP 1 Location: Left arm, BP Patient Position: Sitting)   Pulse 77   Temp 98.1 °F (36.7 °C) (Oral)   Resp 18   Ht 5' 7.5\" (1.715 m)   Wt 203 lb (92.1 kg)   SpO2 98%   BMI 31.33 kg/m²     Wt Readings from Last 3 Encounters:   06/12/19 203 lb (92.1 kg)   05/06/19 203 lb (92.1 kg)   03/12/19 202 lb (91.6 kg)       ASSESSMENT and PLAN    ICD-10-CM ICD-9-CM    1. Peroneal tendinitis, right M76.71 726.79    2. Sprain of calcaneofibular ligament of right ankle, subsequent encounter S93.411D V58.89      845.02    3. Pre-op examination Z01.818 V72.84    4.  Low hemoglobin D64.9 285.9 IRON PROFILE     She is clinically ready for surgery

## 2019-06-12 NOTE — PROGRESS NOTES
Chief Complaint   Patient presents with    Pre-op Exam     1. Have you been to the ER, urgent care clinic since your last visit? Hospitalized since your last visit? No    2. Have you seen or consulted any other health care providers outside of the [address] [place] [address] since your last visit? Include any pap smears or colon screening.  No

## 2019-08-06 ENCOUNTER — OFFICE VISIT (OUTPATIENT)
Dept: INTERNAL MEDICINE CLINIC | Age: 54
End: 2019-08-06

## 2019-08-06 ENCOUNTER — HOSPITAL ENCOUNTER (OUTPATIENT)
Dept: LAB | Age: 54
Discharge: HOME | End: 2019-08-06
Payer: MEDICAID

## 2019-08-06 VITALS
SYSTOLIC BLOOD PRESSURE: 117 MMHG | HEIGHT: 68 IN | OXYGEN SATURATION: 99 % | DIASTOLIC BLOOD PRESSURE: 79 MMHG | HEART RATE: 79 BPM | BODY MASS INDEX: 31.22 KG/M2 | TEMPERATURE: 98.3 F | RESPIRATION RATE: 18 BRPM | WEIGHT: 206 LBS

## 2019-08-06 DIAGNOSIS — E11.40 TYPE 2 DIABETES MELLITUS WITH DIABETIC NEUROPATHY, WITHOUT LONG-TERM CURRENT USE OF INSULIN (HCC): Primary | ICD-10-CM

## 2019-08-06 DIAGNOSIS — N39.0 RECURRENT UTI: ICD-10-CM

## 2019-08-06 DIAGNOSIS — E78.00 HYPERCHOLESTEREMIA: ICD-10-CM

## 2019-08-06 DIAGNOSIS — I10 ESSENTIAL HYPERTENSION: ICD-10-CM

## 2019-08-06 DIAGNOSIS — Z00.00 MEDICARE ANNUAL WELLNESS VISIT, SUBSEQUENT: ICD-10-CM

## 2019-08-06 DIAGNOSIS — E11.40 TYPE 2 DIABETES MELLITUS WITH DIABETIC NEUROPATHY, WITHOUT LONG-TERM CURRENT USE OF INSULIN (HCC): ICD-10-CM

## 2019-08-06 LAB
ALBUMIN SERPL-MCNC: 3.9 G/DL (ref 3.4–5)
ALBUMIN/GLOB SERPL: 1.1 {RATIO} (ref 0.8–1.7)
ALP SERPL-CCNC: 111 U/L (ref 45–117)
ALT SERPL-CCNC: 19 U/L (ref 13–56)
ANION GAP SERPL CALC-SCNC: 3 MMOL/L (ref 3–18)
AST SERPL-CCNC: 11 U/L (ref 10–38)
BILIRUB SERPL-MCNC: 0.4 MG/DL (ref 0.2–1)
BUN SERPL-MCNC: 8 MG/DL (ref 7–18)
BUN/CREAT SERPL: 9 (ref 12–20)
CALCIUM SERPL-MCNC: 9 MG/DL (ref 8.5–10.1)
CHLORIDE SERPL-SCNC: 104 MMOL/L (ref 100–111)
CO2 SERPL-SCNC: 32 MMOL/L (ref 21–32)
CREAT SERPL-MCNC: 0.88 MG/DL (ref 0.6–1.3)
GLOBULIN SER CALC-MCNC: 3.6 G/DL (ref 2–4)
GLUCOSE SERPL-MCNC: 132 MG/DL (ref 74–99)
POTASSIUM SERPL-SCNC: 3.9 MMOL/L (ref 3.5–5.5)
PROT SERPL-MCNC: 7.5 G/DL (ref 6.4–8.2)
SODIUM SERPL-SCNC: 139 MMOL/L (ref 136–145)

## 2019-08-06 PROCEDURE — 36415 COLL VENOUS BLD VENIPUNCTURE: CPT

## 2019-08-06 PROCEDURE — 83036 HEMOGLOBIN GLYCOSYLATED A1C: CPT

## 2019-08-06 PROCEDURE — 87086 URINE CULTURE/COLONY COUNT: CPT

## 2019-08-06 PROCEDURE — 87186 SC STD MICRODIL/AGAR DIL: CPT

## 2019-08-06 PROCEDURE — 80053 COMPREHEN METABOLIC PANEL: CPT

## 2019-08-06 PROCEDURE — 87077 CULTURE AEROBIC IDENTIFY: CPT

## 2019-08-06 NOTE — PATIENT INSTRUCTIONS
Counting Carbohydrates: Care Instructions  Your Care Instructions    You don't have to eat special foods when you have diabetes. You just have to be careful to eat healthy foods. Carbohydrates (carbs) raise blood sugar higher and quicker than any other nutrient. Carbs are found in desserts, breads and cereals, and fruit. They're also in starchy vegetables. These include potatoes, corn, and grains such as rice and pasta. Carbs are also in milk and yogurt. The more carbs you eat at one time, the higher your blood sugar will rise. Spreading carbs all through the day helps keep your blood sugar levels within your target range. Counting carbs is one of the best ways to keep your blood sugar under control. If you use insulin, counting carbs helps you match the right amount of insulin to the number of grams of carbs in a meal. Then you can change your diet and insulin dose as needed. Testing your blood sugar several times a day can help you learn how carbs affect your blood sugar. A registered dietitian or certified diabetes educator can help you plan meals and snacks. Follow-up care is a key part of your treatment and safety. Be sure to make and go to all appointments, and call your doctor if you are having problems. It's also a good idea to know your test results and keep a list of the medicines you take. How can you care for yourself at home? Know your daily amount of carbohydrates  Your daily amount depends on several things, such as your weight, how active you are, which diabetes medicines you take, and what your goals are for your blood sugar levels. A registered dietitian or certified diabetes educator can help you plan how many carbs to include in each meal and snack. For most adults, a guideline for the daily amount of carbs is:  · 45 to 60 grams at each meal. That's about the same as 3 to 4 carbohydrate servings. · 15 to 20 grams at each snack. That's about the same as 1 carbohydrate serving.   Count carbs  Counting carbs lets you know how much rapid-acting insulin to take before you eat. If you use an insulin pump, you get a constant rate of insulin during the day. So the pump must be programmed at meals. This gives you extra insulin to cover the rise in blood sugar after meals. If you take insulin:  · Learn your own insulin-to-carb ratio. You and your diabetes health professional will figure out the ratio. You can do this by testing your blood sugar after meals. For example, you may need a certain amount of insulin for every 15 grams of carbs. · Add up the carb grams in a meal. Then you can figure out how many units of insulin to take based on your insulin-to-carb ratio. · Exercise lowers blood sugar. You can use less insulin than you would if you were not doing exercise. Keep in mind that timing matters. If you exercise within 1 hour after a meal, your body may need less insulin for that meal than it would if you exercised 3 hours after the meal. Test your blood sugar to find out how exercise affects your need for insulin. If you do or don't take insulin:  · Look at labels on packaged foods. This can tell you how many carbs are in a serving. You can also use guides from the American Diabetes Association. · Be aware of portions, or serving sizes. If a package has two servings and you eat the whole package, you need to double the number of grams of carbohydrate listed for one serving. · Protein, fat, and fiber do not raise blood sugar as much as carbs do. If you eat a lot of these nutrients in a meal, your blood sugar will rise more slowly than it would otherwise. Eat from all food groups  · Eat at least three meals a day. · Plan meals to include food from all the food groups. The food groups include grains, fruits, dairy, proteins, and vegetables. · Talk to your dietitian or diabetes educator about ways to add limited amounts of sweets into your meal plan. · If you drink alcohol, talk to your doctor. It may not be recommended when you are taking certain diabetes medicines. Where can you learn more? Go to http://marisela-darryn.info/. Enter J840 in the search box to learn more about \"Counting Carbohydrates: Care Instructions. \"  Current as of: July 25, 2018  Content Version: 12.1  © 9810-5042 [employer]. Care instructions adapted under license by [employer] (which disclaims liability or warranty for this information). If you have questions about a medical condition or this instruction, always ask your healthcare professional. [address] any warranty or liability for your use of this information. Schedule of Personalized Health Plan  (Provide Copy to Patient)  The best way to stay healthy is to live a healthy lifestyle. A healthy lifestyle includes regular exercise, eating a well-balanced diet, keeping a healthy weight and not smoking. Regular physical exams and screening tests are another important way to take care of yourself. Preventive exams provided by health care providers can find health problems early when treatment works best and can keep you from getting certain diseases or illnesses. Preventive services include exams, lab tests, screenings, shots, monitoring and information to help you take care of your own health. All people over 65 should have a pneumonia shot. Pneumonia shots are usually only needed once in a lifetime unless your doctor decides differently. All people over 65 should have a yearly flu shot. People over 65 are at medium to high risk for Hepatitis B. Three shots are needed for complete protection. In addition to your physical exam, some screening tests are recommended:    Bone mass measurement (dexa scan) is recommended every two years  Diabetes Mellitus screening is recommended every year. Glaucoma is an eye disease caused by high pressure in the eye. An eye exam is recommended every year. Cardiovascular screening tests that check your cholesterol and other blood fat (lipid) levels are recommended every five years. Colorectal Cancer screening tests help to find pre-cancerous polyps (growths in the colon) so they can be removed before they turn into cancer. Tests ordered for screening depend on your personal and family history risk factors.     Screening for Breast Cancer is recommended yearly with a mammogram.    Screening for Cervical Cancer is recommended every two years (annually for certain risk factors, such as previous history of STD or abnormal PAP in past 7 years), with a Pelvic Exam with PAP    Here is a list of your current Health Maintenance items with a due date:  Health Maintenance   Topic Date Due    Shingrix Vaccine Age 50> (1 of 2) 06/05/2015    PAP AKA CERVICAL CYTOLOGY  05/19/2019    Influenza Age 5 to Adult  08/01/2019    BREAST CANCER SCRN MAMMOGRAM  09/27/2019    HEMOGLOBIN A1C Q6M  02/06/2020    MEDICARE YEARLY EXAM  05/06/2020    FOOT EXAM Q1  05/06/2020    MICROALBUMIN Q1  05/06/2020    LIPID PANEL Q1  05/06/2020    EYE EXAM RETINAL OR DILATED  05/08/2021    COLONOSCOPY  05/14/2022    DTaP/Tdap/Td series (2 - Td) 07/14/2026    Pneumococcal 0-64 years  Completed

## 2019-08-06 NOTE — PROGRESS NOTES
Chief Complaint   Patient presents with    Diabetes    Hypertension    Cholesterol Problem     1. Have you been to the ER, urgent care clinic since your last visit? Hospitalized since your last visit? No    2. Have you seen or consulted any other health care providers outside of the [address] [place] [address] since your last visit? Include any pap smears or colon screening.  No     3 most recent PHQ Screens 8/6/2019   PHQ Not Done -   Little interest or pleasure in doing things Not at all   Feeling down, depressed, irritable, or hopeless Not at all   Total Score PHQ 2 0   Trouble falling or staying asleep, or sleeping too much Not at all   Feeling tired or having little energy Not at all   Poor appetite, weight loss, or overeating Not at all   Feeling bad about yourself - or that you are a failure or have let yourself or your family down Not at all   Trouble concentrating on things such as school, work, reading, or watching TV Not at all   Moving or speaking so slowly that other people could have noticed; or the opposite being so fidgety that others notice Not at all   Thoughts of being better off dead, or hurting yourself in some way Not at all   PHQ 9 Score 0

## 2019-08-06 NOTE — PROGRESS NOTES
HISTORY OF PRESENT ILLNESS  Claudette Acosta is a 47 y.o. female. HPI Claudette Acosta is here for follow up on diabetes, HTN and hypercholesterolemia. She is still in PT for her right foot/ankle that she had operated on. She states her sugars are in the 120s. She is not smoking cigarettes, but has been vaping. She suspects she may have another UTI. She has h/o recurrent UTIs. She states she is scheduled to see urology in the next month for follow up on UTIs. Review of Systems   Constitutional: Negative. HENT: Negative. Eyes: Negative for blurred vision. Respiratory: Negative for cough and shortness of breath. Cardiovascular: Negative for chest pain and leg swelling. Gastrointestinal: Negative. Genitourinary: Positive for dysuria and frequency. Musculoskeletal: Positive for joint pain (right ankle in a boot). Neurological: Negative for dizziness. Physical Exam   Constitutional: She is oriented to person, place, and time. She appears well-developed and well-nourished. No distress. HENT:   Head: Normocephalic and atraumatic. Eyes: Conjunctivae are normal.   Cardiovascular: Normal rate and regular rhythm. No murmur heard. Pulmonary/Chest: Effort normal and breath sounds normal. She has no wheezes. Musculoskeletal: She exhibits no edema. Neurological: She is alert and oriented to person, place, and time. Visit Vitals  /79 (BP 1 Location: Left arm, BP Patient Position: Sitting)   Pulse 79   Temp 98.3 °F (36.8 °C) (Oral)   Resp 18   Ht 5' 7.5\" (1.715 m)   Wt 206 lb (93.4 kg)   SpO2 99%   BMI 31.79 kg/m²     Wt Readings from Last 3 Encounters:   08/06/19 206 lb (93.4 kg)   06/12/19 203 lb (92.1 kg)   05/06/19 203 lb (92.1 kg)   Recommend increase exercise, diet and weight reduction      ASSESSMENT and PLAN    ICD-10-CM ICD-9-CM    1.  Type 2 diabetes mellitus with diabetic neuropathy, without long-term current use of insulin ([facility]) R21.43 920.26 METABOLIC PANEL, COMPREHENSIVE     357.2 HEMOGLOBIN A1C WITH EAG   2. Recurrent UTI N39.0 599.0 CULTURE, URINE   3. Hypercholesteremia E78.00 272.0 Continue current Rx       4. Essential hypertension I10 401.9 Continue current Rx         Pt verbalized understanding of their condition and diagnoses, treatment plan,  as well as side effects of any new medications prescribed.

## 2019-08-07 LAB
EST. AVERAGE GLUCOSE BLD GHB EST-MCNC: 174 MG/DL
HBA1C MFR BLD: 7.7 % (ref 4.2–5.6)

## 2019-08-08 ENCOUNTER — TELEPHONE (OUTPATIENT)
Dept: INTERNAL MEDICINE CLINIC | Age: 54
End: 2019-08-08

## 2019-08-08 LAB
BACTERIA SPEC CULT: ABNORMAL
SERVICE CMNT-IMP: ABNORMAL

## 2019-08-08 RX ORDER — CIPROFLOXACIN 500 MG/1
500 TABLET ORAL 2 TIMES DAILY
Qty: 14 TAB | Refills: 0 | Status: SHIPPED | OUTPATIENT
Start: 2019-08-08 | End: 2019-08-15

## 2019-08-08 NOTE — TELEPHONE ENCOUNTER
Spoke with pt she is aware of results. She said she has an appt with urology in 2 weeks. She is aware she needs to work on weight loss.

## 2019-08-28 NOTE — PROGRESS NOTES
Progress Notes        Medicare Wellness Visit  Sami Almendarez is a 48 y.o. female and presents for annual Medicare Wellness Visit.     Problem List: Reviewed with patient and discussed risk factors. Patient Active Problem List   Diagnosis Code    Essential hypertension, benign I10    Overactive bladder N32.81    Bipolar disorder ([address] F31.9    Chronic back pain M54.9, G89.29    Glaucoma H40.9    UTI (lower urinary tract infection) N39.0    Psychotic disorder (HCC) F29    Hepatitis C B19.20    GERD (gastroesophageal reflux disease) K21.9    Depression F32.9    Chronic pain G89.29    Asthma J45.909    Plantar verruca B07.0    Leg peripheral nerve injury S84.90XA    Recurrent UTI N39.0    Type 2 diabetes mellitus with diabetic neuropathy (HCC) E11.40    Pseudoarthrosis of lumbar spine S32.009K    Pseudarthrosis following spinal fusion M96.0         Current medical providers:  Patient Care Team:  She Preston PA-C as PCP - General (Family Practice)  Manuel Lua RN as Nurse Navigator     PSH: Reviewed with patient        Past Surgical History:   Procedure Laterality Date    HX CHOLECYSTECTOMY        HX HEENT         EYE SX    HX LUMBAR FUSION   2018     Dr. Kimberlee Echevarria HX ORTHOPAEDIC   2008     [id number]    HX ORTHOPAEDIC   3/2015     right foot decompression peroneal nerve    HX WRIST FRACTURE TX   2018     distal radius, left. Dr. Dede Abrams         SH: Reviewed with patient  Social History            Tobacco Use    Smoking status: Former Smoker       Packs/day: 1.00       Years: 35.00       Pack years: 35.00       Last attempt to quit: 2018       Years since quittin.8    Smokeless tobacco: Current User    Tobacco comment: Pt is using a vape cigarette   Substance Use Topics    Alcohol use: No    Drug use:  No       Comment: recovering addict 12yrs         FH: Reviewed with patient        Family History   Problem Relation Age of Onset    Cancer Mother      Lung Disease Mother      No Known Problems Father           Medications/Allergies: Reviewed with patient  Current Outpatient Medications on File Prior to Visit   Medication Sig Dispense Refill    Lancing Device with Lancets kit 1 lancing device with 100 lancets testing blood sugar daily ([employer] [facility]). DX E11.9 100 Kit 3    Blood-Glucose Meter (BLOOD GLUCOSE MONITORING) monitoring kit [employer] [facility] for testing blood sugar daily DX E11.9 1 Kit 0    glucose blood VI test strips (BLOOD GLUCOSE TEST) strip Daily blood sugar testing ([employer] [facility]) DX E11.9 100 Strip 3    umeclidinium bromide (INCRUSE ELLIPTA IN) Take 62.5 mcg by inhalation daily. 1 puff daily        omeprazole (PRILOSEC) 20 mg capsule Take 20 mg by mouth daily.        PROAIR RESPICLICK 90 mcg/actuation aepb INL 2 PFS PO Q 6 H PRF WHZ   5    INCRUSE ELLIPTA 62.5 mcg/actuation inhaler INL 1 PUFF PO QD   4    albuterol (VENTOLIN HFA) 90 mcg/actuation inhaler Take 2 Puffs by inhalation every six (6) hours as needed for Wheezing.  1 Inhaler 5    fluticasone-vilanterol (BREO ELLIPTA) 100-25 mcg/dose inhaler INHALE ONE PUFF BY MOUTH DAILY 1 Inhaler 5    glucose blood VI test strips (BLOOD GLUCOSE TEST) strip True resultTest blood glucose daily 100 Strip 1    Lancets misc True results lancets Test glucose daily 100 Each 11    ABILIFY 30 mg tablet Take 30 mg by mouth daily.        escitalopram (LEXAPRO) 20 mg tablet Take 20 mg by mouth daily.          gabapentin (NEURONTIN) 600 mg tablet Take 600 mg by mouth two (2) times a day.        diazepam (VALIUM) 10 mg tablet Take 10 mg by mouth every six (6) hours as needed for Sleep.        tapentadol (NUCYNTA) 100 mg tablet Take 100 mg by mouth every six (6) hours as needed.          HYDROcodone-acetaminophen (NORCO) 5-325 mg per tablet TK 1 T PO  Q 4 H PRN P FOR UP TO 5 DAYS   0    fluticasone-vilanterol (BREO ELLIPTA) 100-25 mcg/dose inhaler Take 1 Puff by inhalation daily.        TRUEPLUS LANCETS 30 gauge misc TEST GLUCOSE ONCE  DAILY   11      No current facility-administered medications on file prior to visit. Allergies   Allergen Reactions    Aspirin Hives    Bactrim [Sulfamethoprim Ds] Swelling         Objective:  Visit Vitals  /85 (BP 1 Location: Left arm, BP Patient Position: Sitting)   Pulse 76   Temp 98.4 °F (36.9 °C) (Oral)   Resp 18   Ht 5' 7.5\" (1.715 m)   Wt 203 lb (92.1 kg)   SpO2 98%   BMI 31.33 kg/m²    Body mass index is 31.33 kg/m².     Assessment of cognitive impairment: Alert and oriented x 3     Depression Screen:   3 most recent PHQ Screens 9/10/2015   PHQ Not Done Active Diagnosis of Depression or Bipolar Disorder         Fall Risk Assessment:    Fall Risk Assessment, last 12 mths 9/10/2015   Able to walk? Yes   Fall in past 12 months? No      Pt does not drink alcohol     Functional Ability:   Does the patient exhibit a steady gait? yes   How long did it take the patient to get up and walk from a sitting position? <10 seconds   Is the patient self reliant?  (ie can do own laundry, meals, household chores)  yes      Does the patient handle his/her own medications? yes      Does the patient handle his/her own money? yes      Is the patients home safe (ie good lighting, handrails on stairs and bath, etc.)? yes      Did you notice or did patient express any hearing difficulties? no      Did you notice or did patient express any vision difficulties? no      Were distance and reading eye charts used? yes         Advance Care Planning:   Patient was offered the opportunity to discuss advance care planning:  yes     Does patient have an Advance Directive:  no   If no, did you provide information on Caring Connections?   yes         Plan:           Orders Placed This Encounter    METABOLIC PANEL, COMPREHENSIVE    LIPID PANEL    HEMOGLOBIN A1C WITH EAG    TSH 3RD GENERATION    MICROALBUMIN, UR, RAND W/ MICROALB/CREAT RATIO     DIABETES FOOT EXAM    COLLECTION VENOUS BLOOD,VENIPUNCTURE    SITagliptin-metFORMIN (JANUMET)  mg per tablet    lisinopril (PRINIVIL, ZESTRIL) 5 mg tablet    hydroCHLOROthiazide (HYDRODIURIL) 25 mg tablet    potassium chloride (KLOR-CON) 10 mEq tablet    atorvastatin (LIPITOR) 20 mg tablet    montelukast (SINGULAIR) 10 mg tablet    estradiol (ESTRACE) 0.01 % (0.1 mg/gram) vaginal cream              Health Maintenance   Topic Date Due    Shingrix Vaccine Age 49> (1 of 2) 06/05/2015    FOOT EXAM Q1  02/13/2019    HEMOGLOBIN A1C Q6M  05/06/2019    MICROALBUMIN Q1  05/14/2019    MEDICARE YEARLY EXAM  05/15/2019    PAP AKA CERVICAL CYTOLOGY  05/19/2019    Influenza Age 9 to Adult  08/01/2019    BREAST CANCER SCRN MAMMOGRAM  09/27/2019    LIPID PANEL Q1  11/06/2019    EYE EXAM RETINAL OR DILATED  05/09/2020    COLONOSCOPY  05/14/2022    DTaP/Tdap/Td series (2 - Td) 07/14/2026    Pneumococcal 0-64 years  Completed         *Patient verbalized understanding and agreement with the plan. A copy of the After Visit Summary with personalized health plan was given to the patient today.

## 2019-11-06 ENCOUNTER — OFFICE VISIT (OUTPATIENT)
Dept: INTERNAL MEDICINE CLINIC | Age: 54
End: 2019-11-06

## 2019-11-06 ENCOUNTER — HOSPITAL ENCOUNTER (OUTPATIENT)
Dept: LAB | Age: 54
Discharge: HOME | End: 2019-11-06
Payer: MEDICARE

## 2019-11-06 VITALS
TEMPERATURE: 97.9 F | DIASTOLIC BLOOD PRESSURE: 79 MMHG | RESPIRATION RATE: 18 BRPM | HEART RATE: 66 BPM | OXYGEN SATURATION: 99 % | WEIGHT: 211 LBS | HEIGHT: 68 IN | SYSTOLIC BLOOD PRESSURE: 117 MMHG | BODY MASS INDEX: 31.98 KG/M2

## 2019-11-06 DIAGNOSIS — E11.9 TYPE 2 DIABETES MELLITUS WITHOUT COMPLICATION, WITHOUT LONG-TERM CURRENT USE OF INSULIN (HCC): Primary | ICD-10-CM

## 2019-11-06 DIAGNOSIS — E78.00 HYPERCHOLESTEREMIA: ICD-10-CM

## 2019-11-06 DIAGNOSIS — I10 ESSENTIAL HYPERTENSION, BENIGN: ICD-10-CM

## 2019-11-06 DIAGNOSIS — E11.9 TYPE 2 DIABETES MELLITUS WITHOUT COMPLICATION, WITHOUT LONG-TERM CURRENT USE OF INSULIN (HCC): ICD-10-CM

## 2019-11-06 DIAGNOSIS — I70.0 ATHEROSCLEROSIS OF ABDOMINAL AORTA (HCC): ICD-10-CM

## 2019-11-06 DIAGNOSIS — R05.9 COUGH: ICD-10-CM

## 2019-11-06 LAB
ALBUMIN SERPL-MCNC: 3.9 G/DL (ref 3.4–5)
ALBUMIN/GLOB SERPL: 1.1 {RATIO} (ref 0.8–1.7)
ALP SERPL-CCNC: 90 U/L (ref 45–117)
ALT SERPL-CCNC: 26 U/L (ref 13–56)
ANION GAP SERPL CALC-SCNC: 5 MMOL/L (ref 3–18)
APPEARANCE UR: CLEAR
AST SERPL-CCNC: 17 U/L (ref 10–38)
BACTERIA URNS QL MICRO: NEGATIVE /HPF
BILIRUB SERPL-MCNC: 0.3 MG/DL (ref 0.2–1)
BILIRUB UR QL: NEGATIVE
BUN SERPL-MCNC: 9 MG/DL (ref 7–18)
BUN/CREAT SERPL: 10 (ref 12–20)
CALCIUM SERPL-MCNC: 9.1 MG/DL (ref 8.5–10.1)
CHLORIDE SERPL-SCNC: 105 MMOL/L (ref 100–111)
CHOLEST SERPL-MCNC: 139 MG/DL
CO2 SERPL-SCNC: 32 MMOL/L (ref 21–32)
COLOR UR: YELLOW
CREAT SERPL-MCNC: 0.86 MG/DL (ref 0.6–1.3)
EPITH CASTS URNS QL MICRO: NORMAL /LPF (ref 0–5)
EST. AVERAGE GLUCOSE BLD GHB EST-MCNC: 183 MG/DL
GLOBULIN SER CALC-MCNC: 3.6 G/DL (ref 2–4)
GLUCOSE SERPL-MCNC: 128 MG/DL (ref 74–99)
GLUCOSE UR STRIP.AUTO-MCNC: NEGATIVE MG/DL
HBA1C MFR BLD: 8 % (ref 4.2–5.6)
HDLC SERPL-MCNC: 60 MG/DL (ref 40–60)
HDLC SERPL: 2.3 {RATIO} (ref 0–5)
HGB UR QL STRIP: NEGATIVE
KETONES UR QL STRIP.AUTO: NEGATIVE MG/DL
LDLC SERPL CALC-MCNC: 60.8 MG/DL (ref 0–100)
LEUKOCYTE ESTERASE UR QL STRIP.AUTO: ABNORMAL
LIPID PROFILE,FLP: NORMAL
NITRITE UR QL STRIP.AUTO: NEGATIVE
PH UR STRIP: 7 [PH] (ref 5–8)
POTASSIUM SERPL-SCNC: 3.8 MMOL/L (ref 3.5–5.5)
PROT SERPL-MCNC: 7.5 G/DL (ref 6.4–8.2)
PROT UR STRIP-MCNC: NEGATIVE MG/DL
RBC #/AREA URNS HPF: 0 /HPF (ref 0–5)
SODIUM SERPL-SCNC: 142 MMOL/L (ref 136–145)
SP GR UR REFRACTOMETRY: 1.01 (ref 1–1.03)
TRIGL SERPL-MCNC: 91 MG/DL (ref ?–150)
UROBILINOGEN UR QL STRIP.AUTO: 0.2 EU/DL (ref 0.2–1)
VLDLC SERPL CALC-MCNC: 18.2 MG/DL
WBC URNS QL MICRO: NORMAL /HPF (ref 0–4)

## 2019-11-06 PROCEDURE — 83036 HEMOGLOBIN GLYCOSYLATED A1C: CPT

## 2019-11-06 PROCEDURE — 36415 COLL VENOUS BLD VENIPUNCTURE: CPT

## 2019-11-06 PROCEDURE — 80061 LIPID PANEL: CPT

## 2019-11-06 PROCEDURE — 81001 URINALYSIS AUTO W/SCOPE: CPT

## 2019-11-06 PROCEDURE — 80053 COMPREHEN METABOLIC PANEL: CPT

## 2019-11-06 RX ORDER — LISINOPRIL 5 MG/1
5 TABLET ORAL DAILY
Qty: 90 TAB | Refills: 1 | Status: SHIPPED | OUTPATIENT
Start: 2019-11-06

## 2019-11-06 RX ORDER — ATORVASTATIN CALCIUM 20 MG/1
20 TABLET, FILM COATED ORAL DAILY
Qty: 90 TAB | Refills: 1 | Status: SHIPPED | OUTPATIENT
Start: 2019-11-06

## 2019-11-06 RX ORDER — ESTRADIOL 0.1 MG/G
CREAM VAGINAL
Qty: 42.5 G | Refills: 1 | Status: SHIPPED | OUTPATIENT
Start: 2019-11-06 | End: 2020-01-06

## 2019-11-06 RX ORDER — MONTELUKAST SODIUM 10 MG/1
TABLET ORAL
Qty: 90 TAB | Refills: 1 | Status: SHIPPED | OUTPATIENT
Start: 2019-11-06

## 2019-11-06 RX ORDER — POTASSIUM CHLORIDE 750 MG/1
10 TABLET, EXTENDED RELEASE ORAL DAILY
Qty: 90 TAB | Refills: 1 | Status: SHIPPED | OUTPATIENT
Start: 2019-11-06

## 2019-11-06 RX ORDER — HYDROCHLOROTHIAZIDE 25 MG/1
TABLET ORAL
Qty: 90 TAB | Refills: 1 | Status: SHIPPED | OUTPATIENT
Start: 2019-11-06

## 2019-11-06 NOTE — PROGRESS NOTES
Chief Complaint   Patient presents with    Diabetes    Cholesterol Problem    Hypertension     1. Have you been to the ER, urgent care clinic since your last visit? Hospitalized since your last visit? No    2. Have you seen or consulted any other health care providers outside of the [address] [place] [address] since your last visit? Include any pap smears or colon screening.  Yes Where: Dr. Addison Hough

## 2019-11-06 NOTE — PROGRESS NOTES
HISTORY OF PRESENT ILLNESS  Lili Humphreys is a 47 y.o. female. HPI Lili Humphreys is here for follow up on diabetes, HTN and hypercholesterolemia. Her pain management doctor advised her to discuss with me the findings from her xray of her lumbar spine from August 2019 showing calcification of the aorta. PARASPINOUS SOFT TISSUES: Vascular calcification of the aorta. Reviewed that vascular calcification can be associated with CAD, secondary to smoking, cholesterol, obesity and diabetes. Advised obtaining a CT of the aorta. I reminded her that I had also ordered a CT of her adrenal glands to follow up on a previously seen adenoma. Advised she needs to contact [facility] for scheduling. Review of Systems   Constitutional: Negative. Respiratory: Negative. Cardiovascular: Positive for leg swelling (occasional ankle swelling where she had prior surgery). Gastrointestinal: Negative for abdominal pain, nausea and vomiting. Musculoskeletal: Positive for back pain (chronic). Neurological: Negative for dizziness and headaches. Physical Exam   Constitutional: She is oriented to person, place, and time. She appears well-developed and well-nourished. No distress. HENT:   Head: Normocephalic and atraumatic. Cardiovascular: Normal rate and regular rhythm. Pulmonary/Chest: Effort normal and breath sounds normal. She has no wheezes. Neurological: She is alert and oriented to person, place, and time. Visit Vitals  /79 (BP 1 Location: Left arm, BP Patient Position: Sitting)   Pulse 66   Temp 97.9 °F (36.6 °C) (Oral)   Resp 18   Ht 5' 7.5\" (1.715 m)   Wt 211 lb (95.7 kg)   SpO2 99%   BMI 32.56 kg/m²     Wt Readings from Last 3 Encounters:   11/06/19 211 lb (95.7 kg)   09/18/19 206 lb (93.4 kg)   08/06/19 206 lb (93.4 kg)   She is discouraged with her weight gain. She states there has been no change in her medications.    Suggested calorie counting and logging on an mitul such as myfitness pal. ASSESSMENT and PLAN    ICD-10-CM ICD-9-CM    1. Type 2 diabetes mellitus without complication, without long-term current use of insulin (HCC) Y83.8 392.63 METABOLIC PANEL, COMPREHENSIVE      LIPID PANEL      HEMOGLOBIN A1C WITH EAG      URINALYSIS W/ RFLX MICROSCOPIC      SITagliptin-metFORMIN (JANUMET)  mg per tablet   2. Essential hypertension, benign I10 401.1 lisinopril (PRINIVIL, ZESTRIL) 5 mg tablet      hydroCHLOROthiazide (HYDRODIURIL) 25 mg tablet   3. Cough R05 786.2 montelukast (SINGULAIR) 10 mg tablet   4. Atherosclerosis of abdominal aorta (HCC) I70.0 440.0 CT CHEST WO CONT      CANCELED: CT CHEST WO CONT   5. Hypercholesteremia E78.00 272.0 atorvastatin (LIPITOR) 20 mg tablet     Pt verbalized understanding of their condition and diagnoses, treatment plan,  as well as side effects of any new medications prescribed.

## 2019-11-06 NOTE — PATIENT INSTRUCTIONS
Learning About Atherosclerosis of the Aorta  What is atherosclerosis of the aorta? Having atherosclerosis (say \"ilp-hu-obo-ikrvl-GTJ-dth\") of the aorta means that a material called plaque (fat and calcium) has built up in the inside wall of a large blood vessel called the aorta. This plaque buildup is sometimes called \"hardening of the arteries. \"  The aorta is the main artery that sends oxygen-rich blood from the heart out to the body and to the brain. It runs from your heart down through your stomach area. When plaque builds up, it can cause problems:  · The plaque can weaken the wall of the aorta. The wall might stretch or tear. · Pieces of the plaque can break open, which causes a blood clot to form. A blood clot or a piece of plaque can travel to other parts of your body and block blood flow. So even if you have no symptoms, having this disease makes you more likely to have serious problems such as:  · Stroke. A stroke can happen when a blood clot travels to the brain and blocks blood flow. Without blood and the oxygen it carries, that part of the brain starts to die. · Aortic aneurysm (say \"a-OR-tik XP-ezm-ffy-zum\"). This is a bulge in the wall of the aorta. The bulge can burst, causing serious bleeding. · Aortic dissection. This is a tear between the inner and outer layers of the aorta wall. The tear can cause the wall to separate and burst. This can cause serious bleeding. · Limb ischemia (say \"mif-JAE-hxw-mickeyh\"). This means that your limb, usually a leg, is not getting enough oxygen. It happens when a blood clot cuts off blood supply to the leg. It can permanently damage the tissue. How is it treated? Atherosclerosis of the aorta can be treated with lifestyle changes and medicines that help lower your risk of serious complications.  These medicines include:  · Blood pressure medicines such as ACE (angiotensin-converting enzyme) inhibitors, ARBs (angiotensin II receptor blockers), and beta-blockers. · Cholesterol medicines, such as statins and other medicines to lower cholesterol. · Medicine that prevents blood clots, such as aspirin. These medicines prevent blood clots that can cause a heart attack. You may also get a CT (computed tomography) or MRI (magnetic resonance imaging) scan to check the health of your aorta. How can you care for yourself at home?   Lifestyle changes    · Do not smoke. If you need help quitting, talk to your doctor about stop-smoking programs and medicines. These can increase your chances of quitting for good.     · Eat heart-healthy foods such as fruits, vegetables, whole grains, fish, lean meats, and low-fat or nonfat dairy foods. Limit sodium, sugars, and alcohol.     · If your doctor recommends it, get more exercise. Walking is a good choice. Bit by bit, increase the amount you walk every day. Try for at least 30 minutes on most days of the week.     · Stay at a healthy weight. Lose weight if you need to. Medicines    · Be safe with medicines. Take your medicines exactly as prescribed. Call your doctor if you think you are having a problem with your medicine.     · If you take a blood thinner, be sure to get instructions about how to take your medicine safely. Blood thinners can cause serious bleeding problems.     · Do not take any vitamins, over-the-counter drugs, or herbal products without talking to your doctor first.    Staying healthy    · Avoid colds and flu. Get a pneumococcal vaccine shot. If you have had one before, ask your doctor if you need another dose. Get the flu vaccine every year. If you must be around people with colds or flu, wash your hands often.     · Manage other health problems, such as diabetes.     · If you have high blood pressure, it's very important to monitor your blood pressure and take your blood pressure medicines. When should you call for help? Call 911 anytime you think you may need emergency care.  For example, call if:  · You passed out (lost consciousness). · You have severe pain in your belly, back, or chest.  · You have severe trouble breathing. · You have severe leg pain; numbness; or pale, blue-black skin. · You cough up blood. · You have symptoms of a heart attack. These may include:  ? Chest pain or pressure, or a strange feeling in the chest.  ? Sweating. ? Shortness of breath. ? Nausea or vomiting. ? Pain, pressure, or a strange feeling in the back, neck, jaw, or upper belly or in one or both shoulders or arms. ? Lightheadedness or sudden weakness. ? A fast or irregular heartbeat. · You have symptoms of a stroke. These may include:  ? Sudden numbness, tingling, weakness, or loss of movement in your face, arm, or leg, especially on only one side of your body. ? Sudden vision changes. ? Sudden trouble speaking. ? Sudden confusion or trouble understanding simple statements. ? Sudden problems with walking or balance. ? A sudden, severe headache that is different from past headaches. Watch closely for changes in your health, and be sure to contact your doctor if you have any problems. Follow-up care is a key part of your treatment and safety. Be sure to make and go to all appointments, and call your doctor if you are having problems. It's also a good idea to know your test results and keep a list of the medicines you take. Where can you learn more? Go to http://marisela-darryn.info/. Enter O282 in the search box to learn more about \"Learning About Atherosclerosis of the Aorta. \"  Current as of: April 9, 2019  Content Version: 12.2  © 2687-8402 [employer]. Care instructions adapted under license by [employer] (which disclaims liability or warranty for this information).  If you have questions about a medical condition or this instruction, always ask your healthcare professional. [place] [address] any warranty or liability for your use of this information.

## 2019-11-11 RX ORDER — POTASSIUM CHLORIDE 750 MG/1
TABLET, FILM COATED, EXTENDED RELEASE ORAL
Qty: 90 TAB | Refills: 0 | OUTPATIENT
Start: 2019-11-11

## 2019-11-20 ENCOUNTER — TELEPHONE (OUTPATIENT)
Dept: INTERNAL MEDICINE CLINIC | Age: 54
End: 2019-11-20

## 2019-11-20 NOTE — TELEPHONE ENCOUNTER
The adrenal nodule that I did the CT scan for is consistent with a benign nodule and doesn't need further follow up. It shows a possible chronic fracture in her L3 area. She just needs to make sure this is what she has been seeing the orthopedic or neurosurgeon for. It shows calcifications of the aorta so its important to keep her BP well controlled, keep her cholesterol and sugar under control and to stop smoking. If she experiences chest pain ever she needs to go to the ER and be seen.

## 2019-11-21 NOTE — TELEPHONE ENCOUNTER
Spoke with pt she is aware of below results. She is aware of stable nodule. She said she is not seeing ortho or neurosurgery. She is seeing pain management would like to be referred to Dr. Jorge Restrepo for her back. She is aware of the importance of keeping her BP, cholesterol and sugar under good control and if she experiences any chest pain etc she needs to go to the ER.

## 2019-11-25 DIAGNOSIS — S32.009A: Primary | ICD-10-CM

## 2019-11-25 NOTE — TELEPHONE ENCOUNTER
Johnny, [facility] - 11/17/2019 12:29 PM EST  EXAM: CT ABDOMEN WITH AND WITHOUT CONTRAST    CLINICAL INDICATION/HISTORY: Follow-up left adrenal nodule. COMPARISON: CT abdomen pelvis 4/9/2018    TECHNIQUE:  CT abdomen performed prior to and following administration of 100 cc of Omnipaque 350 IV contrast.  All CT scans at this facility are performed using dose optimization technique as appropriate to the performed examination, to include automated exposure control, adjustment of the mA and/or kV according to patient's size (including appropriate matching for site-specific examinations), or use of an iterative reconstruction technique. FINDINGS:   Lower chest: Minimal bibasilar atelectasis. Liver: Negative. Biliary: Cholecystectomy    Pancreas: Negative. Spleen: Negative. Adrenal glands:   -No significant change in oblong left adrenal nodule measuring 2.1 cm in length and 0.9 cm in width, Hounsfield unit -15 on noncontrast images, 35 Hounsfield unit on portal venous phase, and 6 Hounsfield unit on 15 minute delayed images.  -This correlates with a 57% absolute washout, and 82% relative washout compatible with an adrenal adenoma.  -Right adrenal gland is unremarkable    Kidneys: Negative. Stomach, Small Bowel and Colon: Negative. Lymph nodes: No lymphadenopathy. Vessels: Unremarkable for age. Peritoneal Spaces: No free fluid or free air. Body wall: Tiny fat-containing umbilical hernia. .    Bones: Interval new fracture of the right L3 lamina, however appears subacute to chronic due to sclerotic borders. Partially visualized L4-L5 posterior spinal fusion appear intact without evidence of loosening. IMPRESSION  1. Stable 2.1 cm left adrenal nodule since at least 2017, demonstrating 57% absolute washout, and 82% relative washout, compatible with a benign adrenal adenoma.   2. Interval fracture of the right L3 lamina, likely representing adjacent segment degenerative changes from spinal hardware. Spinal hardware appears intact. Dictated ByNydia Ashraf on 11/17/2019 10:37 AM    As attending radiologist, I have assessed the study images, and dictated or reviewed/edited the final report as needed.      Signed By: Mehdi Connors MD on 11/17/2019 12:27 PM

## 2021-07-09 ENCOUNTER — HOSPITAL ENCOUNTER (EMERGENCY)
Facility: HOSPITAL | Age: 56
Discharge: HOME | End: 2021-07-09

## 2021-07-09 VITALS
TEMPERATURE: 98 F | WEIGHT: 130 LBS | OXYGEN SATURATION: 99 % | RESPIRATION RATE: 18 BRPM | HEART RATE: 96 BPM | SYSTOLIC BLOOD PRESSURE: 119 MMHG | DIASTOLIC BLOOD PRESSURE: 71 MMHG | HEIGHT: 61 IN | BODY MASS INDEX: 24.55 KG/M2

## 2021-07-09 DIAGNOSIS — S39.012A STRAIN OF LUMBAR REGION, INITIAL ENCOUNTER: Primary | ICD-10-CM

## 2021-07-09 DIAGNOSIS — M54.9 BACK PAIN: ICD-10-CM

## 2021-07-09 PROCEDURE — 99283 EMERGENCY DEPT VISIT LOW MDM: CPT

## 2021-07-09 PROCEDURE — 99283 EMERGENCY DEPT VISIT LOW MDM: CPT | Mod: ,,, | Performed by: NURSE PRACTITIONER

## 2021-07-09 PROCEDURE — 63600175 PHARM REV CODE 636 W HCPCS: Performed by: NURSE PRACTITIONER

## 2021-07-09 PROCEDURE — 99283 PR EMERGENCY DEPT VISIT,LEVEL III: ICD-10-PCS | Mod: ,,, | Performed by: NURSE PRACTITIONER

## 2021-07-09 PROCEDURE — 96372 THER/PROPH/DIAG INJ SC/IM: CPT

## 2021-07-09 RX ORDER — CYCLOBENZAPRINE HCL 10 MG
10 TABLET ORAL 3 TIMES DAILY PRN
Qty: 15 TABLET | Refills: 0 | Status: SHIPPED | OUTPATIENT
Start: 2021-07-09 | End: 2021-07-14

## 2021-07-09 RX ORDER — KETOROLAC TROMETHAMINE 30 MG/ML
60 INJECTION, SOLUTION INTRAMUSCULAR; INTRAVENOUS
Status: COMPLETED | OUTPATIENT
Start: 2021-07-09 | End: 2021-07-09

## 2021-07-09 RX ORDER — NAPROXEN 500 MG/1
500 TABLET ORAL 2 TIMES DAILY WITH MEALS
Qty: 60 TABLET | Refills: 0 | Status: SHIPPED | OUTPATIENT
Start: 2021-07-09 | End: 2023-09-13

## 2021-07-09 RX ORDER — LANOLIN ALCOHOL/MO/W.PET/CERES
400 CREAM (GRAM) TOPICAL DAILY
COMMUNITY
End: 2023-09-20

## 2021-07-09 RX ORDER — IBUPROFEN 200 MG
200 TABLET ORAL EVERY 6 HOURS PRN
COMMUNITY
End: 2021-07-09

## 2021-07-09 RX ORDER — ACETAMINOPHEN 325 MG/1
325 TABLET ORAL EVERY 6 HOURS PRN
COMMUNITY
End: 2021-07-09

## 2021-07-09 RX ORDER — IBUPROFEN 200 MG
400 TABLET ORAL EVERY 6 HOURS PRN
Qty: 30 TABLET | Refills: 0
Start: 2021-07-09 | End: 2023-09-13

## 2021-07-09 RX ORDER — ACETAMINOPHEN 500 MG
1000 TABLET ORAL 3 TIMES DAILY PRN
Qty: 30 TABLET | Refills: 0
Start: 2021-07-09

## 2021-07-09 RX ADMIN — KETOROLAC TROMETHAMINE 60 MG: 30 INJECTION, SOLUTION INTRAMUSCULAR at 05:07

## 2021-07-12 ENCOUNTER — TELEPHONE (OUTPATIENT)
Dept: EMERGENCY MEDICINE | Facility: HOSPITAL | Age: 56
End: 2021-07-12

## 2023-08-17 NOTE — PROGRESS NOTES
HISTORY OF PRESENT ILLNESS  Patricio Wynn is a 46 y.o. female. HPI Patricio Wynn is here for follow up on diabetes, HTN and hypercholesterolemia. She is checking her blood sugars daily and they are running in the low 100s- 120s. She denies any sx or glucometer readings of hypoglycemia. She has seen ortho for right hip pain which she also associates with her right knee pain. She would like a brace for her right knee. She has had knee xrays done several years ago. MRI was also done. These were ordered by Dr. Ernesto Nance her pain management Dr.     She has seen podiatry also. Knee, Bilat, Complete2/3/2014  [facility]  Result Impression   Impression:    Normal left knee. Chronic bone infarct proximal right tibia. No other abnormality of the right knee. Review of Systems   Constitutional: Negative. HENT: Negative. Eyes: Negative. Had recent laser surgery   Respiratory: Positive for cough and shortness of breath. Is seeing pulmonology. Had a CT done and is scheduled to follow up next week. On incruse now and Breo. She said she was advised to continue singulair and needs a refill of that today. Cardiovascular: Negative. Musculoskeletal: Positive for joint pain (right hip, right knee). Neurological: Negative for tingling. Physical Exam   Constitutional: She is oriented to person, place, and time. She appears well-developed and well-nourished. No distress. Eyes: Conjunctivae are normal.   Cardiovascular: Normal rate and regular rhythm. Pulmonary/Chest: Effort normal. She has no wheezes. Musculoskeletal: She exhibits no edema. Neurological: She is alert and oriented to person, place, and time.      Visit Vitals    /81 (BP 1 Location: Left arm, BP Patient Position: Sitting)    Pulse 72    Temp 97.9 °F (36.6 °C) (Oral)    Resp 18    Ht 5' 7\" (1.702 m)    Wt 187 lb (84.8 kg)    SpO2 97%    BMI 29.29 kg/m2     Wt Readings from Last 3 Encounters:   05/15/17 187 lb (84.8 kg)   03/27/17 185 lb (83.9 kg)   02/15/17 185 lb (83.9 kg)     Results for orders placed or performed in visit on 05/15/17   AMB POC URINE, MICROALBUMIN, SEMIQUANTITATIVE   Result Value Ref Range    Microalbumin urine (POC) 10 mg/L    Microalbumin/creat ratio (POC) <30 mg/g    Creatinine(Crt), urine 100 mg/dL   AMB POC URINALYSIS DIP STICK AUTO W/O MICRO   Result Value Ref Range    Color (UA POC) Yellow     Clarity (UA POC) Clear     Glucose (UA POC) 1+ Negative    Bilirubin (UA POC) Negative Negative    Ketones (UA POC) Negative Negative    Specific gravity (UA POC) 1.020 1.001 - 1.035    Blood (UA POC) Negative Negative    pH (UA POC) 7.0 4.6 - 8.0    Protein (UA POC) Negative Negative mg/dL    Urobilinogen (UA POC) 2 mg/dL 0.2 - 1    Nitrites (UA POC) Positive Negative    Leukocyte esterase (UA POC) Negative Negative       ASSESSMENT and PLAN     ICD-10-CM ICD-9-CM    1. Type 2 diabetes mellitus without complication, without long-term current use of insulin (HCC) E11.9 250.00 AMB POC URINE, MICROALBUMIN, SEMIQUANTITATIVE      HEMOGLOBIN A1C WITH EAG      LIPID PANEL      METABOLIC PANEL, COMPREHENSIVE      AMB POC URINALYSIS DIP STICK AUTO W/O MICRO   2. Essential hypertension, benign I10 401.1 NH COLLECTION VENOUS BLOOD,VENIPUNCTURE      LIPID PANEL   3. Cough R05 786.2 montelukast (SINGULAIR) 10 mg tablet   4. Leukocytes in urine R82.99 791.7 CULTURE, URINE   5. Chronic pain of right knee M25.561 719.46 Completed paperwork for knee brace    G89.29 338.29    Pt verbalized understanding of their condition and diagnoses, treatment plan,  as well as side effects of any new medications prescribed. [Alert] : alert [Well Nourished] : well nourished [No Acute Distress] : no acute distress [Normal Sclera/Conjunctiva] : normal sclera/conjunctiva [No Proptosis] : no proptosis [No Lid Lag] : no lid lag [No Neck Mass] : no neck mass was observed [Supple] : the neck was supple [Thyroid Not Enlarged] : the thyroid was not enlarged [No Thyroid Nodules] : no palpable thyroid nodules [No Respiratory Distress] : no respiratory distress [No Accessory Muscle Use] : no accessory muscle use [Normal Rate and Effort] : normal respiratory rate and effort [Clear to Auscultation] : lungs were clear to auscultation bilaterally [Normal S1, S2] : normal S1 and S2 [No Murmurs] : no murmurs [Normal Rate] : heart rate was normal [Regular Rhythm] : with a regular rhythm [2+] : 2+ in the dorsalis pedis [Oriented x3] : oriented to person, place, and time [Normal Affect] : the affect was normal [Normal Mood] : the mood was normal [Normal] : normal [Diminished Throughout Both Feet] : normal tactile sensation with monofilament testing throughout both feet

## 2023-08-22 NOTE — TELEPHONE ENCOUNTER
She does have bacteria in her urine, so I will send in an antibiotic again. She has a follow up scheduled with urology for recurrent UTIs. Her A1c went up a point from 6.7 to 7.7. Its not bad, but I don't want it going up further. I'd like her to watch her diet and work on losing even 5-10 lbs. Mara Beltran [FreeTextEntry1] : Hx of severe KARLENE -  patient reluctant to use CPAP    Rhinitis - cont with medical treatment, meds renewed ,   GERD-  cont current meds  diet, stress reduction, alcohol intake, weight loss  discussed   GI follow up for repeat colonoscopy due to Hx of multiple adenomatous  polyps on last one 3 years ago

## 2023-09-04 NOTE — PATIENT INSTRUCTIONS
Schedule of Personalized Health Plan  (Provide Copy to Patient)  The best way to stay healthy is to live a healthy lifestyle. A healthy lifestyle includes regular exercise, eating a well-balanced diet, keeping a healthy weight and not smoking. Regular physical exams and screening tests are another important way to take care of yourself. Preventive exams provided by health care providers can find health problems early when treatment works best and can keep you from getting certain diseases or illnesses. Preventive services include exams, lab tests, screenings, shots, monitoring and information to help you take care of your own health. All people over 65 should have a pneumonia shot. Pneumonia shots are usually only needed once in a lifetime unless your doctor decides differently. All people over 65 should have a yearly flu shot. People over 65 are at medium to high risk for Hepatitis B. Three shots are needed for complete protection. In addition to your physical exam, some screening tests are recommended:    Bone mass measurement (dexa scan) is recommended every two years  Diabetes Mellitus screening is recommended every year. Glaucoma is an eye disease caused by high pressure in the eye. An eye exam is recommended every year. Cardiovascular screening tests that check your cholesterol and other blood fat (lipid) levels are recommended every five years. Colorectal Cancer screening tests help to find pre-cancerous polyps (growths in the colon) so they can be removed before they turn into cancer. Tests ordered for screening depend on your personal and family history risk factors.     Screening for Breast Cancer is recommended yearly with a mammogram.    Screening for Cervical Cancer is recommended every two years (annually for certain risk factors, such as previous history of STD or abnormal PAP in past 7 years), with a Pelvic Exam with PAP    Here is a list of your current Health Maintenance items with a due date:  Health Maintenance   Topic Date Due    MICROALBUMIN Q1  04/14/2017    EYE EXAM RETINAL OR DILATED Q1  04/26/2017    HEMOGLOBIN A1C Q6M  05/14/2017    LIPID PANEL Q1  11/14/2017    FOOT EXAM Q1  02/15/2018    BREAST CANCER SCRN MAMMOGRAM  09/26/2018    PAP AKA CERVICAL CYTOLOGY  05/19/2019    COLONOSCOPY  05/14/2022    DTaP/Tdap/Td series (2 - Td) 07/14/2026    Pneumococcal 19-64 Medium Risk  Completed    INFLUENZA AGE 9 TO ADULT  Completed Cane/Rollator

## 2023-09-13 ENCOUNTER — OFFICE VISIT (OUTPATIENT)
Dept: FAMILY MEDICINE | Facility: CLINIC | Age: 58
End: 2023-09-13
Payer: COMMERCIAL

## 2023-09-13 VITALS
SYSTOLIC BLOOD PRESSURE: 111 MMHG | DIASTOLIC BLOOD PRESSURE: 78 MMHG | HEIGHT: 61 IN | HEART RATE: 81 BPM | WEIGHT: 157 LBS | RESPIRATION RATE: 18 BRPM | BODY MASS INDEX: 29.64 KG/M2 | TEMPERATURE: 98 F | OXYGEN SATURATION: 94 %

## 2023-09-13 DIAGNOSIS — N39.0 ACUTE UTI: Primary | ICD-10-CM

## 2023-09-13 DIAGNOSIS — R30.0 DYSURIA: ICD-10-CM

## 2023-09-13 LAB
BILIRUB SERPL-MCNC: NORMAL MG/DL
BILIRUB UR QL STRIP: NEGATIVE
BLOOD URINE, POC: NORMAL
CLARITY UR: ABNORMAL
COLOR UR: YELLOW
COLOR, POC UA: YELLOW
GLUCOSE UR QL STRIP: NORMAL
GLUCOSE UR STRIP-MCNC: NORMAL MG/DL
KETONES UR QL STRIP: NORMAL
KETONES UR STRIP-SCNC: NEGATIVE MG/DL
LEUKOCYTE ESTERASE UR QL STRIP: NEGATIVE
LEUKOCYTE ESTERASE URINE, POC: NORMAL
NITRITE UR QL STRIP: POSITIVE
NITRITE, POC UA: NORMAL
PH UR STRIP: 6 PH UNITS
PH, POC UA: 6.5
PROT UR QL STRIP: NEGATIVE
PROTEIN, POC: NORMAL
RBC # UR STRIP: ABNORMAL /UL
RBC #/AREA URNS HPF: 2 /HPF
SP GR UR STRIP: 1.02
SPECIFIC GRAVITY, POC UA: 1.01
SQUAMOUS #/AREA URNS LPF: ABNORMAL /HPF
UROBILINOGEN UR STRIP-ACNC: NORMAL MG/DL
UROBILINOGEN, POC UA: 0.2
WBC #/AREA URNS HPF: 5 /HPF

## 2023-09-13 PROCEDURE — 3008F PR BODY MASS INDEX (BMI) DOCUMENTED: ICD-10-PCS | Mod: ,,, | Performed by: FAMILY MEDICINE

## 2023-09-13 PROCEDURE — 87077 CULTURE AEROBIC IDENTIFY: CPT | Mod: ,,, | Performed by: CLINICAL MEDICAL LABORATORY

## 2023-09-13 PROCEDURE — 3078F PR MOST RECENT DIASTOLIC BLOOD PRESSURE < 80 MM HG: ICD-10-PCS | Mod: ,,, | Performed by: FAMILY MEDICINE

## 2023-09-13 PROCEDURE — 1159F MED LIST DOCD IN RCRD: CPT | Mod: ,,, | Performed by: FAMILY MEDICINE

## 2023-09-13 PROCEDURE — 1159F PR MEDICATION LIST DOCUMENTED IN MEDICAL RECORD: ICD-10-PCS | Mod: ,,, | Performed by: FAMILY MEDICINE

## 2023-09-13 PROCEDURE — 3078F DIAST BP <80 MM HG: CPT | Mod: ,,, | Performed by: FAMILY MEDICINE

## 2023-09-13 PROCEDURE — 99203 PR OFFICE/OUTPT VISIT, NEW, LEVL III, 30-44 MIN: ICD-10-PCS | Mod: ,,, | Performed by: FAMILY MEDICINE

## 2023-09-13 PROCEDURE — 81003 POCT URINALYSIS W/O SCOPE: ICD-10-PCS | Mod: QW,,, | Performed by: FAMILY MEDICINE

## 2023-09-13 PROCEDURE — 87077 CULTURE, URINE: ICD-10-PCS | Mod: ,,, | Performed by: CLINICAL MEDICAL LABORATORY

## 2023-09-13 PROCEDURE — 3074F PR MOST RECENT SYSTOLIC BLOOD PRESSURE < 130 MM HG: ICD-10-PCS | Mod: ,,, | Performed by: FAMILY MEDICINE

## 2023-09-13 PROCEDURE — 87186 SC STD MICRODIL/AGAR DIL: CPT | Mod: ,,, | Performed by: CLINICAL MEDICAL LABORATORY

## 2023-09-13 PROCEDURE — 1160F PR REVIEW ALL MEDS BY PRESCRIBER/CLIN PHARMACIST DOCUMENTED: ICD-10-PCS | Mod: ,,, | Performed by: FAMILY MEDICINE

## 2023-09-13 PROCEDURE — 99203 OFFICE O/P NEW LOW 30 MIN: CPT | Mod: ,,, | Performed by: FAMILY MEDICINE

## 2023-09-13 PROCEDURE — 81001 URINALYSIS: ICD-10-PCS | Mod: ,,, | Performed by: CLINICAL MEDICAL LABORATORY

## 2023-09-13 PROCEDURE — 1160F RVW MEDS BY RX/DR IN RCRD: CPT | Mod: ,,, | Performed by: FAMILY MEDICINE

## 2023-09-13 PROCEDURE — 87086 CULTURE, URINE: ICD-10-PCS | Mod: ,,, | Performed by: CLINICAL MEDICAL LABORATORY

## 2023-09-13 PROCEDURE — 87086 URINE CULTURE/COLONY COUNT: CPT | Mod: ,,, | Performed by: CLINICAL MEDICAL LABORATORY

## 2023-09-13 PROCEDURE — 87186 CULTURE, URINE: ICD-10-PCS | Mod: ,,, | Performed by: CLINICAL MEDICAL LABORATORY

## 2023-09-13 PROCEDURE — 3008F BODY MASS INDEX DOCD: CPT | Mod: ,,, | Performed by: FAMILY MEDICINE

## 2023-09-13 PROCEDURE — 3074F SYST BP LT 130 MM HG: CPT | Mod: ,,, | Performed by: FAMILY MEDICINE

## 2023-09-13 PROCEDURE — 81001 URINALYSIS AUTO W/SCOPE: CPT | Mod: ,,, | Performed by: CLINICAL MEDICAL LABORATORY

## 2023-09-13 PROCEDURE — 81003 URINALYSIS AUTO W/O SCOPE: CPT | Mod: QW,,, | Performed by: FAMILY MEDICINE

## 2023-09-13 RX ORDER — PHENAZOPYRIDINE HYDROCHLORIDE 200 MG/1
200 TABLET, FILM COATED ORAL 3 TIMES DAILY PRN
Qty: 6 TABLET | Refills: 0 | Status: SHIPPED | OUTPATIENT
Start: 2023-09-13 | End: 2023-09-15

## 2023-09-13 RX ORDER — FLUCONAZOLE 150 MG/1
TABLET ORAL
Qty: 3 TABLET | Refills: 0 | Status: SHIPPED | OUTPATIENT
Start: 2023-09-13

## 2023-09-13 RX ORDER — NITROFURANTOIN 25; 75 MG/1; MG/1
100 CAPSULE ORAL 2 TIMES DAILY
Qty: 14 CAPSULE | Refills: 0 | Status: SHIPPED | OUTPATIENT
Start: 2023-09-13 | End: 2023-09-20

## 2023-09-13 NOTE — PROGRESS NOTES
Clinic Note    Patient Name: Joseline Siegel  : 1965  MRN: [id number]    Chief Complaint   Patient presents with    Urinary Tract Infection     Possible UTI    Hematuria       HPI:    Ms. Joseline Siegel is a 58 y.o. female who presents to clinic today with CC of dysuria and hematuria X 3 days.   Patient is, otherwise, without complaints.     Medications:  Medication List with Changes/Refills   New Medications    FLUCONAZOLE (DIFLUCAN) 150 MG TAB    Take one tablet by mouth every 72 hours X 3 doses if needed for yeast infection after completion of antibiotics.    NITROFURANTOIN, MACROCRYSTAL-MONOHYDRATE, (MACROBID) 100 MG CAPSULE    Take 1 capsule (100 mg total) by mouth 2 (two) times daily. for 7 days    PHENAZOPYRIDINE (PYRIDIUM) 200 MG TABLET    Take 1 tablet (200 mg total) by mouth 3 (three) times daily as needed for Pain.   Current Medications    ACETAMINOPHEN (TYLENOL) 500 MG TABLET    Take 2 tablets (1,000 mg total) by mouth 3 (three) times daily as needed for Pain.    MAGNESIUM OXIDE (MAG-OX) 400 MG (241.3 MG MAGNESIUM) TABLET    Take 400 mg by mouth once daily.   Discontinued Medications    IBUPROFEN (ADVIL,MOTRIN) 200 MG TABLET    Take 2 tablets (400 mg total) by mouth every 6 (six) hours as needed for Pain.    NAPROXEN (NAPROSYN) 500 MG TABLET    Take 1 tablet (500 mg total) by mouth 2 (two) times daily with meals.        Allergies: Bactrim [sulfamethoxazole-trimethoprim] and Penicillins      Past Medical History:    History reviewed. No pertinent past medical history.    Past Surgical History:    Past Surgical History:   Procedure Laterality Date    ABDOMINAL SURGERY      CHOLECYSTECTOMY           Social History:    Social History     Tobacco Use   Smoking Status Every Day    Current packs/day: 1.00    Types: Cigarettes   Smokeless Tobacco Not on file     Social History     Substance and Sexual Activity   Alcohol Use Never     Social History     Substance and Sexual Activity   Drug Use Never         Family  "History:    Family History   Problem Relation Age of Onset    Heart disease Mother     Heart disease Sister        Review of Systems:    Review of Systems   Constitutional:  Positive for fever. Negative for appetite change, chills, fatigue and unexpected weight change.   Eyes:  Negative for visual disturbance.   Respiratory:  Negative for cough and shortness of breath.    Cardiovascular:  Negative for chest pain.        Reports occasional swelling in legs after standing on her feet all day at work - resolves with elevation overnight   Gastrointestinal:  Negative for abdominal pain, change in bowel habit, constipation, diarrhea, nausea, vomiting and change in bowel habit.   Genitourinary:  Positive for dysuria, frequency and hematuria.   Musculoskeletal:  Positive for back pain. Negative for arthralgias.   Integumentary:  Negative for rash.   Neurological:  Negative for dizziness and headaches.   Psychiatric/Behavioral:  The patient is not nervous/anxious.         Vitals:    Vitals:    09/13/23 0841   BP: 111/78   BP Location: Left arm   Patient Position: Sitting   BP Method: Medium (Automatic)   Pulse: 81   Resp: 18   Temp: 97.9 °F (36.6 °C)   TempSrc: Oral   SpO2: (!) 94%   Weight: 71.2 kg (157 lb)   Height: 5' 1" (1.549 m)       Body mass index is 29.66 kg/m².    Wt Readings from Last 3 Encounters:   09/13/23 0841 71.2 kg (157 lb)   07/09/21 1656 59 kg (130 lb)        Physical Exam:    Physical Exam  Constitutional:       General: She is not in acute distress.     Appearance: Normal appearance.   HENT:      Nose: Nose normal.      Mouth/Throat:      Mouth: Mucous membranes are moist.      Pharynx: Oropharynx is clear.   Eyes:      Conjunctiva/sclera: Conjunctivae normal.   Cardiovascular:      Rate and Rhythm: Normal rate and regular rhythm.      Heart sounds: Normal heart sounds. No murmur heard.  Pulmonary:      Effort: Pulmonary effort is normal. No respiratory distress.      Breath sounds: Normal breath " sounds. No wheezing, rhonchi or rales.   Abdominal:      General: Bowel sounds are normal.      Palpations: Abdomen is soft.      Tenderness: There is no abdominal tenderness. There is no right CVA tenderness, left CVA tenderness, guarding or rebound.   Musculoskeletal:      Cervical back: Neck supple.      Right lower leg: No edema.      Left lower leg: No edema.   Skin:     Findings: No rash.   Neurological:      General: No focal deficit present.      Mental Status: She is alert. Mental status is at baseline.   Psychiatric:         Mood and Affect: Mood normal.         Assessment/Plan:   1. Acute UTI  -     Urinalysis; Future; Expected date: 09/13/2023  -     Urine Culture High Risk; Future; Expected date: 09/13/2023  -     nitrofurantoin, macrocrystal-monohydrate, (MACROBID) 100 MG capsule; Take 1 capsule (100 mg total) by mouth 2 (two) times daily. for 7 days  Dispense: 14 capsule; Refill: 0  -     phenazopyridine (PYRIDIUM) 200 MG tablet; Take 1 tablet (200 mg total) by mouth 3 (three) times daily as needed for Pain.  Dispense: 6 tablet; Refill: 0  -     fluconazole (DIFLUCAN) 150 MG Tab; Take one tablet by mouth every 72 hours X 3 doses if needed for yeast infection after completion of antibiotics.  Dispense: 3 tablet; Refill: 0  -     Urinalysis, Microscopic    2. Dysuria  -     POCT URINALYSIS W/O SCOPE    Discussed screening labs, immunizations, pap smear, mammogram, and colonoscopy but patient refused all screenings.      RTC prn if symptoms worsen or fail to resolve.  Patient voiced understanding and is agreeable to plan.      Celsa Connelly MD    Family Medicine

## 2023-09-15 LAB — UA COMPLETE W REFLEX CULTURE PNL UR: ABNORMAL

## 2023-09-20 ENCOUNTER — HOSPITAL ENCOUNTER (EMERGENCY)
Facility: HOSPITAL | Age: 58
Discharge: HOME | End: 2023-09-21
Payer: COMMERCIAL

## 2023-09-20 ENCOUNTER — TELEPHONE (OUTPATIENT)
Dept: FAMILY MEDICINE | Facility: CLINIC | Age: 58
End: 2023-09-20
Payer: COMMERCIAL

## 2023-09-20 DIAGNOSIS — T78.40XA ALLERGIC REACTION, INITIAL ENCOUNTER: Primary | ICD-10-CM

## 2023-09-20 DIAGNOSIS — R05.9 COUGH IN ADULT: ICD-10-CM

## 2023-09-20 LAB
BASOPHILS # BLD AUTO: 0.02 K/UL (ref 0–0.2)
BASOPHILS NFR BLD AUTO: 0.1 % (ref 0–1)
DIFFERENTIAL METHOD BLD: ABNORMAL
EOSINOPHIL # BLD AUTO: 0.44 K/UL (ref 0–0.5)
EOSINOPHIL NFR BLD AUTO: 2.5 % (ref 1–4)
EOSINOPHIL NFR BLD MANUAL: 2 % (ref 1–4)
ERYTHROCYTE [DISTWIDTH] IN BLOOD BY AUTOMATED COUNT: 13.8 % (ref 11.5–14.5)
HCT VFR BLD AUTO: 46.7 % (ref 38–47)
HGB BLD-MCNC: 15.4 G/DL (ref 12–16)
LYMPHOCYTES # BLD AUTO: 1.49 K/UL (ref 1–4.8)
LYMPHOCYTES NFR BLD AUTO: 8.4 % (ref 27–41)
LYMPHOCYTES NFR BLD MANUAL: 13 % (ref 27–41)
MCH RBC QN AUTO: 30.5 PG (ref 27–31)
MCHC RBC AUTO-ENTMCNC: 33 G/DL (ref 32–36)
MCV RBC AUTO: 92.5 FL (ref 80–96)
MONOCYTES # BLD AUTO: 0.92 K/UL (ref 0–0.8)
MONOCYTES NFR BLD AUTO: 5.2 % (ref 2–6)
MONOCYTES NFR BLD MANUAL: 6 % (ref 2–6)
MPC BLD CALC-MCNC: 9.4 FL (ref 9.4–12.4)
NEUTROPHILS # BLD AUTO: 14.8 K/UL (ref 1.8–7.7)
NEUTROPHILS NFR BLD AUTO: 83.8 % (ref 53–65)
NEUTS SEG NFR BLD MANUAL: 79 % (ref 50–62)
NRBC BLD MANUAL-RTO: ABNORMAL %
PLATELET # BLD AUTO: 310 K/UL (ref 150–400)
PLATELET MORPHOLOGY: NORMAL
RBC # BLD AUTO: 5.05 M/UL (ref 4.2–5.4)
RBC MORPH BLD: NORMAL
WBC # BLD AUTO: 17.67 K/UL (ref 4.5–11)

## 2023-09-20 PROCEDURE — 85025 COMPLETE CBC W/AUTO DIFF WBC: CPT | Performed by: NURSE PRACTITIONER

## 2023-09-20 PROCEDURE — 96374 THER/PROPH/DIAG INJ IV PUSH: CPT

## 2023-09-20 PROCEDURE — 99900035 HC TECH TIME PER 15 MIN (STAT)

## 2023-09-20 PROCEDURE — 99284 PR EMERGENCY DEPT VISIT,LEVEL IV: ICD-10-PCS | Mod: ,,, | Performed by: NURSE PRACTITIONER

## 2023-09-20 PROCEDURE — 96375 TX/PRO/DX INJ NEW DRUG ADDON: CPT

## 2023-09-20 PROCEDURE — 94640 AIRWAY INHALATION TREATMENT: CPT

## 2023-09-20 PROCEDURE — 99284 EMERGENCY DEPT VISIT MOD MDM: CPT | Mod: 25

## 2023-09-20 PROCEDURE — 25000003 PHARM REV CODE 250: Performed by: NURSE PRACTITIONER

## 2023-09-20 PROCEDURE — 99284 EMERGENCY DEPT VISIT MOD MDM: CPT | Mod: ,,, | Performed by: NURSE PRACTITIONER

## 2023-09-20 PROCEDURE — 27000221 HC OXYGEN, UP TO 24 HOURS

## 2023-09-20 PROCEDURE — 94761 N-INVAS EAR/PLS OXIMETRY MLT: CPT

## 2023-09-20 PROCEDURE — 63600175 PHARM REV CODE 636 W HCPCS: Performed by: NURSE PRACTITIONER

## 2023-09-20 PROCEDURE — 25000242 PHARM REV CODE 250 ALT 637 W/ HCPCS: Performed by: NURSE PRACTITIONER

## 2023-09-20 RX ORDER — DEXAMETHASONE SODIUM PHOSPHATE 4 MG/ML
4 INJECTION, SOLUTION INTRA-ARTICULAR; INTRALESIONAL; INTRAMUSCULAR; INTRAVENOUS; SOFT TISSUE
Status: COMPLETED | OUTPATIENT
Start: 2023-09-20 | End: 2023-09-20

## 2023-09-20 RX ORDER — FAMOTIDINE 10 MG/ML
20 INJECTION INTRAVENOUS
Status: COMPLETED | OUTPATIENT
Start: 2023-09-20 | End: 2023-09-20

## 2023-09-20 RX ORDER — ALBUTEROL SULFATE 0.83 MG/ML
2.5 SOLUTION RESPIRATORY (INHALATION)
Status: COMPLETED | OUTPATIENT
Start: 2023-09-20 | End: 2023-09-20

## 2023-09-20 RX ORDER — DIPHENHYDRAMINE HYDROCHLORIDE 50 MG/ML
25 INJECTION INTRAMUSCULAR; INTRAVENOUS
Status: COMPLETED | OUTPATIENT
Start: 2023-09-20 | End: 2023-09-20

## 2023-09-20 RX ADMIN — FAMOTIDINE 20 MG: 10 INJECTION, SOLUTION INTRAVENOUS at 11:09

## 2023-09-20 RX ADMIN — DIPHENHYDRAMINE HYDROCHLORIDE 25 MG: 50 INJECTION INTRAMUSCULAR; INTRAVENOUS at 10:09

## 2023-09-20 RX ADMIN — DEXAMETHASONE SODIUM PHOSPHATE 4 MG: 4 INJECTION, SOLUTION INTRA-ARTICULAR; INTRALESIONAL; INTRAMUSCULAR; INTRAVENOUS; SOFT TISSUE at 11:09

## 2023-09-20 RX ADMIN — ALBUTEROL SULFATE 2.5 MG: 2.5 SOLUTION RESPIRATORY (INHALATION) at 11:09

## 2023-09-20 NOTE — TELEPHONE ENCOUNTER
Contacted patient in regards to urine. Informed patient that her urine culture did grow out bacteria sensitive to antibiotics and to continue taking antibiotics as prescribed. Patient voiced understanding and had no further questions.     ----- Message from Rut Connelly MD sent at 9/15/2023  8:49 AM CDT -----  Please call patient regarding lab results. Urine culture grew out bacteria sensitive to antibiotic as previously prescribed. Complete antibiotic as prescribed. Thanks!

## 2023-09-21 VITALS
OXYGEN SATURATION: 93 % | WEIGHT: 151 LBS | BODY MASS INDEX: 28.53 KG/M2 | SYSTOLIC BLOOD PRESSURE: 140 MMHG | DIASTOLIC BLOOD PRESSURE: 83 MMHG | HEART RATE: 98 BPM | TEMPERATURE: 98 F | RESPIRATION RATE: 20 BRPM

## 2023-09-21 LAB
ALBUMIN SERPL BCP-MCNC: 3.6 G/DL (ref 3.5–5)
ALBUMIN/GLOB SERPL: 0.7 {RATIO}
ALP SERPL-CCNC: 118 U/L (ref 46–118)
ALT SERPL W P-5'-P-CCNC: 23 U/L (ref 13–56)
ANION GAP SERPL CALCULATED.3IONS-SCNC: 12 MMOL/L (ref 7–16)
AST SERPL W P-5'-P-CCNC: 11 U/L (ref 15–37)
BILIRUB SERPL-MCNC: 0.3 MG/DL (ref ?–1.2)
BUN SERPL-MCNC: 18 MG/DL (ref 7–18)
BUN/CREAT SERPL: 22 (ref 6–20)
CALCIUM SERPL-MCNC: 9.3 MG/DL (ref 8.5–10.1)
CHLORIDE SERPL-SCNC: 103 MMOL/L (ref 98–107)
CO2 SERPL-SCNC: 29 MMOL/L (ref 21–32)
CREAT SERPL-MCNC: 0.83 MG/DL (ref 0.55–1.02)
EGFR (NO RACE VARIABLE) (RUSH/TITUS): 82 ML/MIN/1.73M2
GLOBULIN SER-MCNC: 5 G/DL (ref 2–4)
GLUCOSE SERPL-MCNC: 152 MG/DL (ref 74–106)
POTASSIUM SERPL-SCNC: 3.3 MMOL/L (ref 3.5–5.1)
PROT SERPL-MCNC: 8.6 G/DL (ref 6.4–8.2)
SODIUM SERPL-SCNC: 141 MMOL/L (ref 136–145)

## 2023-09-21 PROCEDURE — 63600175 PHARM REV CODE 636 W HCPCS: Performed by: NURSE PRACTITIONER

## 2023-09-21 PROCEDURE — 94640 AIRWAY INHALATION TREATMENT: CPT

## 2023-09-21 PROCEDURE — 94761 N-INVAS EAR/PLS OXIMETRY MLT: CPT

## 2023-09-21 PROCEDURE — 25000003 PHARM REV CODE 250: Performed by: NURSE PRACTITIONER

## 2023-09-21 PROCEDURE — 27000221 HC OXYGEN, UP TO 24 HOURS

## 2023-09-21 PROCEDURE — 80053 COMPREHEN METABOLIC PANEL: CPT | Performed by: NURSE PRACTITIONER

## 2023-09-21 RX ORDER — POTASSIUM CHLORIDE 750 MG/1
60 TABLET, EXTENDED RELEASE ORAL
Status: COMPLETED | OUTPATIENT
Start: 2023-09-21 | End: 2023-09-21

## 2023-09-21 RX ORDER — DEXAMETHASONE SODIUM PHOSPHATE 4 MG/ML
4 INJECTION, SOLUTION INTRA-ARTICULAR; INTRALESIONAL; INTRAMUSCULAR; INTRAVENOUS; SOFT TISSUE
Status: COMPLETED | OUTPATIENT
Start: 2023-09-21 | End: 2023-09-21

## 2023-09-21 RX ORDER — EPINEPHRINE 0.3 MG/.3ML
1 INJECTION SUBCUTANEOUS
Qty: 1 EACH | Refills: 2 | Status: SHIPPED | OUTPATIENT
Start: 2023-09-21 | End: 2024-09-20

## 2023-09-21 RX ORDER — METHYLPREDNISOLONE 4 MG/1
TABLET ORAL
Qty: 1 EACH | Refills: 0 | Status: SHIPPED | OUTPATIENT
Start: 2023-09-21 | End: 2023-10-11

## 2023-09-21 RX ORDER — FAMOTIDINE 20 MG/1
20 TABLET, FILM COATED ORAL DAILY
Qty: 10 TABLET | Refills: 0 | Status: SHIPPED | OUTPATIENT
Start: 2023-09-21 | End: 2024-09-20

## 2023-09-21 RX ADMIN — POTASSIUM CHLORIDE 60 MEQ: 750 TABLET, EXTENDED RELEASE ORAL at 02:09

## 2023-09-21 RX ADMIN — DEXAMETHASONE SODIUM PHOSPHATE 4 MG: 4 INJECTION, SOLUTION INTRA-ARTICULAR; INTRALESIONAL; INTRAMUSCULAR; INTRAVENOUS; SOFT TISSUE at 01:09

## 2023-09-21 NOTE — RESPIRATORY THERAPY
Patient admitted for allergic reaction to possibly Macrobid. Her breath sounds = tight, bronchospastic nonproductive cough.  Tolerated Albuterol Rx fairly well with Oxygen. Post Rx breath sounds = increased aeration and starting to auscultate faint tight wheezing. Patient with less shortness of breath post Albuterol Rx.  Still on NC 2 = SATS 93%.  Rechecked on patient at 2315....Sats on NC 2 = 95%.  She states that she is starting to get her breath better. Breath sounds = tight wheeze, but actually hearing a wheeze now.

## 2023-09-21 NOTE — RESPIRATORY THERAPY
Rx ordered by TEDDY Del Toro to give patient a breathing Rx with Decadron 4mg and 3 CC normal saline.  Alex Dallas RN pulled meds and mixed. Kristen Wang RRT gave nebulizer Rx and monitored patient during Rx. Patient tolerated Decadron treatment well with increased aeration post Rx and hearing expiratory wheezing.  Beginning of Rx breath sounds were very tight and diminished.  Patient still on NC 2 with SATS 97%. patient had a productive cough post nebulizer Rx = moderate amount clear tenacious sputum.

## 2023-09-21 NOTE — ED PROVIDER NOTES
Encounter Date: 9/20/2023       History     Chief Complaint   Patient presents with    Allergic Reaction     Joseline Siegel is a 58 y.o. White /female presenting to ED with rash to extremities and shortness of breath for 4 hours. No new exposure. States that she completed a course of Macrobid a few days ago but sx did not start until today. She is very anxious. Speaking in clear full sentences. Denies facial, mouth, tongue, throat swelling. Sats 86% RA and tachycardia on arrival. Otherwise, VSS at this time.      The history is provided by the patient.     Review of patient's allergies indicates:   Allergen Reactions    Bactrim [sulfamethoxazole-trimethoprim] Shortness Of Breath    Penicillins Rash     History reviewed. No pertinent past medical history.  Past Surgical History:   Procedure Laterality Date    ABDOMINAL SURGERY      APPENDECTOMY      CHOLECYSTECTOMY       Family History   Problem Relation Age of Onset    Heart disease Mother     Heart disease Sister      Social History     Tobacco Use    Smoking status: Every Day     Current packs/day: 1.00     Types: Cigarettes   Substance Use Topics    Alcohol use: Never    Drug use: Never     Review of Systems   Constitutional:  Negative for activity change, appetite change, chills, fatigue and fever.   HENT:  Negative for congestion, facial swelling, postnasal drip, rhinorrhea, sinus pressure, sinus pain, sneezing and sore throat.    Eyes:  Negative for discharge, redness, itching and visual disturbance.   Respiratory:  Positive for shortness of breath and wheezing. Negative for cough, chest tightness and stridor.    Cardiovascular:  Negative for chest pain and palpitations.   Gastrointestinal:  Negative for abdominal pain, diarrhea, nausea and vomiting.   Genitourinary:  Negative for dysuria, frequency and urgency.   Skin:  Positive for rash. Negative for pallor.   Psychiatric/Behavioral:  Negative for confusion. The patient is nervous/anxious.    All other systems  reviewed and are negative.      Physical Exam     Initial Vitals [09/20/23 2249]   BP Pulse Resp Temp SpO2   (!) 148/90 (!) 120 (!) 28 98.2 °F (36.8 °C) (!) 86 %      MAP       --         Physical Exam    Nursing note and vitals reviewed.  Constitutional: She appears well-developed and well-nourished. She is not diaphoretic. No distress.   HENT:   Head: Normocephalic and atraumatic.   Right Ear: External ear normal.   Left Ear: External ear normal.   Nose: Nose normal.   Mouth/Throat: Oropharynx is clear and moist.   Eyes: Conjunctivae and EOM are normal. Pupils are equal, round, and reactive to light. Right eye exhibits no discharge. Left eye exhibits no discharge.   Neck: Neck supple. No thyromegaly present. No tracheal deviation present.   Normal range of motion.  Cardiovascular:  Normal rate, regular rhythm, normal heart sounds and intact distal pulses.           Pulmonary/Chest: No stridor. Tachypnea noted. She has wheezes (scattered). She exhibits no tenderness.   Musculoskeletal:         General: No tenderness or edema. Normal range of motion.      Cervical back: Normal range of motion and neck supple.     Lymphadenopathy:     She has no cervical adenopathy.   Neurological: She is alert and oriented to person, place, and time. She has normal strength. GCS score is 15. GCS eye subscore is 4. GCS verbal subscore is 5. GCS motor subscore is 6.   Skin: Skin is warm and dry. Capillary refill takes less than 2 seconds.         Medical Screening Exam   See Full Note    ED Course   Procedures  Labs Reviewed   CBC WITH DIFFERENTIAL - Abnormal; Notable for the following components:       Result Value    WBC 17.67 (*)     Neutrophils % 83.8 (*)     Lymphocytes % 8.4 (*)     Neutrophils, Abs 14.80 (*)     Monocytes, Absolute 0.92 (*)     All other components within normal limits   MANUAL DIFFERENTIAL - Abnormal; Notable for the following components:    Segmented Neutrophils, Man % 79 (*)     Lymphocytes, Man % 13 (*)      All other components within normal limits   COMPREHENSIVE METABOLIC PANEL - Abnormal; Notable for the following components:    Potassium 3.3 (*)     Glucose 152 (*)     BUN/Creatinine Ratio 22 (*)     Total Protein 8.6 (*)     Globulin 5.0 (*)     AST 11 (*)     All other components within normal limits   CBC W/ AUTO DIFFERENTIAL    Narrative:     The following orders were created for panel order CBC auto differential.  Procedure                               Abnormality         Status                     ---------                               -----------         ------                     CBC with Differential[[id number]]        Abnormal            Final result               Manual Differential[[id number]]          Abnormal            Final result                 Please view results for these tests on the individual orders.          Imaging Results              X-Ray Chest PA And Lateral (In process)                      Medications   potassium chloride SA CR tablet 60 mEq (has no administration in time range)   dexAMETHasone injection 4 mg (4 mg Intravenous Given 9/20/23 2302)   diphenhydrAMINE injection 25 mg (25 mg Intravenous Given 9/20/23 2256)   albuterol nebulizer solution 2.5 mg (2.5 mg Nebulization Given 9/20/23 2302)   famotidine (PF) injection 20 mg (20 mg Intravenous Given 9/20/23 2302)   dexAMETHasone injection 4 mg (4 mg Nebulization Given 9/21/23 0134)     Medical Decision Making  At this time, I do not feel that radiology studies or lab will add any benefit to care or diagnostics since sx are improving. Patient has reported improved of sx after Benadryl, Decadron, Pepcid, Albuterol. Patient has been monitored for total of 3 hours since ED arrival without return of symptoms. Patient denies any shortness of breath, N/V/D or abd pain, swelling of tongue, mouth or throat at discharge. Rash improved. I feel that patient has reached maximum benefit from ED evaluation, treatment and observation. I  thoroughly explained all findings to patient to the best of my ability and answered all questions to their satisfaction. I educated patient on the general/expected course of the condition and treatment options in ED and after discharge. I also informed patient that our evaluation in the emergency department today is an evaluation of the condition in one moment in time. If there is any worsening of the condition of if symptoms persist, the patient has been instructed to return to the ED immediately for further evaluation. Patient has been given Epi pen and instructed to avoid Macrobid until allergy testing has been performed. Patient has also been advised to follow up with PCP in 2-3 days for re-evaluation. Patient verbalized understanding of the instructions and is agreeable to disposition. No questions or concerns at this time.     Dx:  Allergic reaction     Amount and/or Complexity of Data Reviewed  Independent Historian:      Details: Joseline Siegel is a 58 y.o. White /female presenting to ED with rash to extremities and shortness of breath for 4 hours. No new exposure. States that she completed a course of Macrobid a few days ago but sx did not start until today. She is very anxious. Speaking in clear full sentences. Denies facial, mouth, tongue, throat swelling. Sats 86% RA and tachycardia on arrival. Otherwise, VSS at this time.   Labs: ordered.     Details: CBC- WBC 17.67. Otherwise, unremarkable.    CMP- Potassium 3.3. Otherwise, unremarkable.    Radiology: ordered.    Risk  Prescription drug management.               ED Course as of 09/21/23 0220   Wed Sep 20, 2023   2344 Shortness of breath has subsided. Rash improving. Breath sounds now clear. Clinic chart reviewed and it appears patient sats typically run 94% RA. Will continue to monitor patient to ensure sx do not return. [LP]   u Sep 21, 2023   0128 Patient feeling much better. Attempted to wean O2 and patient dropped to low 90s and upper 80s. Breath  sounds clear. No resp distress.  [LP]   0218 Sats improved. Patient ready for d/c. Discussed results. Breath sounds clear. Patient speaking in full sentences and in NAD. Patient is in agreement with plan to d/c home. V/u of all discharge instructions. No questions or concerns at this time.   [LP]      ED Course User Index  [LP] Karina Trevino FNP                    Clinical Impression:   Final diagnoses:  [T78.40XA] Allergic reaction, initial encounter (Primary)  [R05.9] Cough in adult        ED Disposition Condition    Discharge Stable          ED Prescriptions       Medication Sig Dispense Start Date End Date Auth. Provider    methylPREDNISolone (MEDROL DOSEPACK) 4 mg tablet Take as directed until complete 1 each 9/21/2023 10/11/2023 Karina Trevino FNP    EPINEPHrine (EPIPEN) 0.3 mg/0.3 mL AtIn Inject 0.3 mLs (0.3 mg total) into the muscle as needed (anaphalyctic reaction). 1 each 9/21/2023 9/20/2024 Karina Trevino FNP    famotidine (PEPCID) 20 MG tablet Take 1 tablet (20 mg total) by mouth once daily. 10 tablet 9/21/2023 9/20/2024 Karina Trevino FNP          Follow-up Information    None          Karina Trevino FNP  09/21/23 0223

## 2023-09-21 NOTE — DISCHARGE INSTRUCTIONS
Follow up with Primary Care Provider in 2-3 days. Discuss referral to Asthma and Allergy specialist.  Zyrtec daily as directed.  Complete full course of steroids, medrol dose pack.  Benadryl 25 mg every 4-6 hours as needed for rash, itching, swelling, hives.  Epinephrine as directed for any future anaphylactic reaction.  Pepcid 20 mg daily for 10 days.  Return to emergency department for any new or worsening symptoms, respiratory problems, face, mouth, tongue swelling or any other worrisome symptoms.

## 2025-01-27 ENCOUNTER — OFFICE VISIT (OUTPATIENT)
Dept: FAMILY MEDICINE | Facility: CLINIC | Age: 60
End: 2025-01-27
Payer: COMMERCIAL

## 2025-01-27 ENCOUNTER — HOSPITAL ENCOUNTER (OUTPATIENT)
Dept: RADIOLOGY | Facility: HOSPITAL | Age: 60
Discharge: HOME | End: 2025-01-27
Attending: FAMILY MEDICINE
Payer: COMMERCIAL

## 2025-01-27 VITALS
DIASTOLIC BLOOD PRESSURE: 84 MMHG | WEIGHT: 165.38 LBS | RESPIRATION RATE: 20 BRPM | SYSTOLIC BLOOD PRESSURE: 126 MMHG | BODY MASS INDEX: 31.23 KG/M2 | HEART RATE: 104 BPM | TEMPERATURE: 99 F | OXYGEN SATURATION: 95 % | HEIGHT: 61 IN

## 2025-01-27 DIAGNOSIS — J01.00 ACUTE NON-RECURRENT MAXILLARY SINUSITIS: Primary | ICD-10-CM

## 2025-01-27 DIAGNOSIS — R05.1 ACUTE COUGH: ICD-10-CM

## 2025-01-27 PROCEDURE — 3008F BODY MASS INDEX DOCD: CPT | Mod: ,,, | Performed by: FAMILY MEDICINE

## 2025-01-27 PROCEDURE — 1159F MED LIST DOCD IN RCRD: CPT | Mod: ,,, | Performed by: FAMILY MEDICINE

## 2025-01-27 PROCEDURE — 3079F DIAST BP 80-89 MM HG: CPT | Mod: ,,, | Performed by: FAMILY MEDICINE

## 2025-01-27 PROCEDURE — 71046 X-RAY EXAM CHEST 2 VIEWS: CPT | Mod: TC

## 2025-01-27 PROCEDURE — 3074F SYST BP LT 130 MM HG: CPT | Mod: ,,, | Performed by: FAMILY MEDICINE

## 2025-01-27 PROCEDURE — 99213 OFFICE O/P EST LOW 20 MIN: CPT | Mod: 25,,, | Performed by: FAMILY MEDICINE

## 2025-01-27 PROCEDURE — 96372 THER/PROPH/DIAG INJ SC/IM: CPT | Mod: ,,, | Performed by: FAMILY MEDICINE

## 2025-01-27 RX ORDER — CHLORPHENIRAMINE MALEATE, DEXTROMETHORPHAN HYDROBROMIDE, AND PHENYLEPHRINE HYDROCHLORIDE 4; 10; 10 MG/1; MG/1; MG/1
1 TABLET, COATED ORAL EVERY 8 HOURS PRN
Qty: 30 TABLET | Refills: 0 | Status: SHIPPED | OUTPATIENT
Start: 2025-01-27

## 2025-01-27 RX ORDER — DEXAMETHASONE SODIUM PHOSPHATE 4 MG/ML
4 INJECTION, SOLUTION INTRA-ARTICULAR; INTRALESIONAL; INTRAMUSCULAR; INTRAVENOUS; SOFT TISSUE
Status: COMPLETED | OUTPATIENT
Start: 2025-01-27 | End: 2025-01-27

## 2025-01-27 RX ORDER — METHYLPREDNISOLONE ACETATE 40 MG/ML
40 INJECTION, SUSPENSION INTRA-ARTICULAR; INTRALESIONAL; INTRAMUSCULAR; SOFT TISSUE
Status: COMPLETED | OUTPATIENT
Start: 2025-01-27 | End: 2025-01-27

## 2025-01-27 RX ORDER — PREDNISONE 20 MG/1
20 TABLET ORAL 2 TIMES DAILY
COMMUNITY
Start: 2025-01-24

## 2025-01-27 RX ORDER — AZITHROMYCIN 250 MG/1
250 TABLET, FILM COATED ORAL DAILY
COMMUNITY
Start: 2025-01-24

## 2025-01-27 RX ADMIN — METHYLPREDNISOLONE ACETATE 40 MG: 40 INJECTION, SUSPENSION INTRA-ARTICULAR; INTRALESIONAL; INTRAMUSCULAR; SOFT TISSUE at 04:01

## 2025-01-27 RX ADMIN — DEXAMETHASONE SODIUM PHOSPHATE 4 MG: 4 INJECTION, SOLUTION INTRA-ARTICULAR; INTRALESIONAL; INTRAMUSCULAR; INTRAVENOUS; SOFT TISSUE at 04:01

## 2025-01-27 NOTE — PROGRESS NOTES
Clinic Note    Patient Name: Joseline Siegel  : 1965  MRN: [id number]    Chief Complaint   Patient presents with    Cough    Sinus Problem       HPI:    Ms. Joseline Siegel is a 59 y.o. female who presents to clinic today with CC of cough and sinus pressure/congestion X 1 week. Reports low grade fever at home. Reports cough. Reports thick yellow/green mucus.  Reports she is currently on azithromycin and prednisone as prescribed recently by an urgent care clinic.   Patient is, otherwise, without complaints.     Medications:  Medication List with Changes/Refills   New Medications    CHLORPHENIRAMINE-PHENYLEPH-DM (ED A-HIST DM) 4-10-10 MG TAB    Take 1 tablet by mouth every 8 (eight) hours as needed (cough or congestion).   Current Medications    ACETAMINOPHEN (TYLENOL) 500 MG TABLET    Take 2 tablets (1,000 mg total) by mouth 3 (three) times daily as needed for Pain.    AZITHROMYCIN (Z-LEAH) 250 MG TABLET    Take 250 mg by mouth once daily.    CETIRIZINE HCL (ZYRTEC ORAL)    Take by mouth.    EPINEPHRINE (EPIPEN) 0.3 MG/0.3 ML ATIN    Inject 0.3 mLs (0.3 mg total) into the muscle as needed (anaphalyctic reaction).    FAMOTIDINE (PEPCID) 20 MG TABLET    Take 1 tablet (20 mg total) by mouth once daily.    FLUCONAZOLE (DIFLUCAN) 150 MG TAB    Take one tablet by mouth every 72 hours X 3 doses if needed for yeast infection after completion of antibiotics.    PREDNISONE (DELTASONE) 20 MG TABLET    Take 20 mg by mouth 2 (two) times daily.        Allergies: Bactrim [sulfamethoxazole-trimethoprim], Macrobid [nitrofurantoin monohyd/m-cryst], and Penicillins      Past Medical History:    No past medical history on file.    Past Surgical History:    Past Surgical History:   Procedure Laterality Date    ABDOMINAL SURGERY      APPENDECTOMY      CHOLECYSTECTOMY           Social History:    Social History     Tobacco Use   Smoking Status Every Day    Current packs/day: 1.00    Types: Cigarettes   Smokeless Tobacco Not on file     Social  "History     Substance and Sexual Activity   Alcohol Use Never     Social History     Substance and Sexual Activity   Drug Use Never         Family History:    Family History   Problem Relation Name Age of Onset    Heart disease Mother      Heart disease Sister         Review of Systems:    Review of Systems   Constitutional:  Positive for fatigue and fever. Negative for appetite change, chills and unexpected weight change.   HENT:  Positive for nasal congestion, postnasal drip and sinus pressure/congestion. Negative for ear pain and sore throat.    Eyes:  Negative for visual disturbance.   Respiratory:  Positive for cough. Negative for shortness of breath.    Cardiovascular:  Negative for chest pain and leg swelling.   Gastrointestinal:  Negative for abdominal pain, change in bowel habit, constipation, diarrhea, nausea and vomiting.   Musculoskeletal:  Negative for arthralgias.   Integumentary:  Negative for rash.   Neurological:  Positive for headaches. Negative for dizziness.   Psychiatric/Behavioral:  The patient is not nervous/anxious.         Vitals:    Vitals:    01/27/25 1336   BP: 126/84   BP Location: Left arm   Patient Position: Sitting   Pulse: 104   Resp: 20   Temp: 99.3 °F (37.4 °C)   TempSrc: Oral   SpO2: 95%   Weight: 75 kg (165 lb 6.4 oz)   Height: 5' 1" (1.549 m)       Body mass index is 31.25 kg/m².    Wt Readings from Last 3 Encounters:   01/27/25 1336 75 kg (165 lb 6.4 oz)   09/20/23 2249 68.5 kg (151 lb)   09/13/23 0841 71.2 kg (157 lb)        Physical Exam:    Physical Exam  Constitutional:       General: She is not in acute distress.     Appearance: Normal appearance. She is obese.   HENT:      Nose: Congestion present.      Mouth/Throat:      Mouth: Mucous membranes are moist.      Pharynx: Oropharynx is clear.   Eyes:      Conjunctiva/sclera: Conjunctivae normal.   Cardiovascular:      Rate and Rhythm: Normal rate and regular rhythm.      Heart sounds: Normal heart sounds. No murmur " heard.  Pulmonary:      Effort: Pulmonary effort is normal. No respiratory distress.      Breath sounds: Normal breath sounds. No wheezing, rhonchi or rales.   Abdominal:      General: Bowel sounds are normal.      Palpations: Abdomen is soft.      Tenderness: There is no abdominal tenderness.   Musculoskeletal:      Cervical back: Neck supple.      Right lower leg: No edema.      Left lower leg: No edema.   Skin:     Findings: No rash.   Neurological:      General: No focal deficit present.      Mental Status: She is alert. Mental status is at baseline.   Psychiatric:         Mood and Affect: Mood normal.         Assessment/Plan:   1. Acute non-recurrent maxillary sinusitis  -     dexAMETHasone injection 4 mg  -     methylPREDNISolone acetate injection 40 mg  -     chlorpheniramine-phenyleph-DM (ED A-HIST DM) 4-10-10 mg Tab; Take 1 tablet by mouth every 8 (eight) hours as needed (cough or congestion).  Dispense: 30 tablet; Refill: 0  - Continue azithromycin and prednisone as prescribed recently at Urgent Care.    2. Acute cough  -     X-Ray Chest PA And Lateral; Future; Expected date: 01/27/2025      RTC prn if symptoms worsen or fail to resolve.  Patient voiced understanding and is agreeable to plan.      Celsa Connelly MD    Family Medicine

## 2025-01-30 ENCOUNTER — TELEPHONE (OUTPATIENT)
Dept: FAMILY MEDICINE | Facility: CLINIC | Age: 60
End: 2025-01-30
Payer: COMMERCIAL

## 2025-01-30 NOTE — TELEPHONE ENCOUNTER
Contacted pt in regards to x-ray. Informed pt of x-ray results. Pt voiced understanding and had no further questions.     ----- Message from Rut Connelly MD sent at 1/29/2025 11:11 AM CST -----  Please call patient regarding XR results. XR does not reveal pneumonia. Continue medications as previously prescribed. Is she feeling improved? Thanks!

## 2025-02-17 LAB — HBA1C MFR BLD: 5.9 % (ref 4–6)

## 2025-03-03 ENCOUNTER — PATIENT OUTREACH (OUTPATIENT)
Facility: HOSPITAL | Age: 60
End: 2025-03-03
Payer: COMMERCIAL

## 2025-03-03 NOTE — PROGRESS NOTES
Population Health Chart Review & Patient Outreach Details    Updates Requested / Reviewed:  [x]  Care Team Updated  [x]  Care Everywhere Updated & Reviewed  [x]  [facility] Reviewed  [x]   Reviewed      Health Maintenance Topics Addressed and Outreach Outcomes / Actions Taken:  A1C [x]  updated with A1C result on 2/17/25 found in [facility].                Cervical Cancer Screening [x] No documentation found in [facility], [facility], [facility], or Care Everywhere for pap smear.      [x] Comment placed in chart that pt needs this.

## (undated) DEVICE — PROCESSING KIT

## (undated) DEVICE — TOOL 14MH30 LEGEND 14CM 3MM: Brand: MIDAS REX ™

## (undated) DEVICE — GRAFT DELIVERY KIT

## (undated) DEVICE — INTENDED FOR TISSUE SEPARATION, AND OTHER PROCEDURES THAT REQUIRE A SHARP SURGICAL BLADE TO PUNCTURE OR CUT.: Brand: BARD-PARKER ® CARBON RIB-BACK BLADES

## (undated) DEVICE — ASPIRATION KIT

## (undated) DEVICE — BRUSH SCRB PCMX NL CLN 12ML --

## (undated) DEVICE — GOWN,SIRUS,FABRNF,XL,20/CS: Brand: MEDLINE

## (undated) DEVICE — SUTURE COAT VCRL SZ 0 L18IN ABSRB UD CTX L48MM 1/2 CIR J724D

## (undated) DEVICE — SUTURE VCRL SZ 0 L36IN ABSRB UD L48MM CTX 1/2 CIR J978H

## (undated) DEVICE — NDL SPNE QNCKE 18GX3.5IN LF --

## (undated) DEVICE — SYR 10ML LUER LOK 1/5ML GRAD --

## (undated) DEVICE — THE MILL DISPOSABLE - FINE

## (undated) DEVICE — 3M™ STERI-STRIP™ REINFORCED ADHESIVE SKIN CLOSURES, R1548, 1 IN X 5 IN (25 MM X 125 MM), 4 STRIPS/ENVELOPE: Brand: 3M™ STERI-STRIP™

## (undated) DEVICE — PAD PREP ALCOHOL LG STERILE -- CONVERT TO ITEM 305014

## (undated) DEVICE — SHEET,DRAPE,70X100,STERILE: Brand: MEDLINE

## (undated) DEVICE — SUTURE ABSORBABLE BRAIDED 2-0 CT-1 27 IN UD VICRYL J259H

## (undated) DEVICE — SUTURE VCRL SZ 4-0 L27IN ABSRB UD L19MM PS-2 3/8 CIR PRIM J426H

## (undated) DEVICE — NDL PRT INJ NSAF BLNT 18GX1.5 --

## (undated) DEVICE — INTENDED FOR TISSUE SEPARATION, AND OTHER PROCEDURES THAT REQUIRE A SHARP SURGICAL BLADE TO PUNCTURE OR CUT.: Brand: BARD-PARKER ® STAINLESS STEEL BLADES

## (undated) DEVICE — STERILE POLYISOPRENE POWDER-FREE SURGICAL GLOVES: Brand: PROTEXIS

## (undated) DEVICE — GRAFT BNE MAR ART BMC MED

## (undated) DEVICE — PREP SKN DURAPREP 26ML APPL --

## (undated) DEVICE — PACKING 8004051 NEURAY 200PK 13X152MM: Brand: NEURAY ®

## (undated) DEVICE — STERILE POLYISOPRENE POWDER-FREE SURGICAL GLOVES WITH EMOLLIENT COATING: Brand: PROTEXIS

## (undated) DEVICE — BIPOLAR FORCEPS CORD: Brand: VALLEYLAB

## (undated) DEVICE — SYR LR LCK 1ML GRAD NSAF 30ML --

## (undated) DEVICE — BLADE ELECTRODE: Brand: EDGE

## (undated) DEVICE — MAYO STAND COVER: Brand: CONVERTORS

## (undated) DEVICE — SPONGE LAP 18X18IN STRL -- 5/PK

## (undated) DEVICE — 12FR FRAZIER SUCTION HANDLE: Brand: CARDINAL HEALTH

## (undated) DEVICE — KENDALL SCD EXPRESS SLEEVES, KNEE LENGTH, MEDIUM: Brand: KENDALL SCD

## (undated) DEVICE — PACKING 8004050 NEURAY 200PK 7X152MM: Brand: NEURAY ®

## (undated) DEVICE — GAUZE SPONGES,12 PLY: Brand: CURITY

## (undated) DEVICE — X-RAY SPONGES,12 PLY: Brand: DERMACEA

## (undated) DEVICE — SPONGE GZ W4XL4IN COT 12 PLY TYP VII WVN C FLD DSGN

## (undated) DEVICE — SUTURE COAT VCRL SZ 4-0 L18IN ABSRB UD L19MM PS-2 1/2 CIR J496G

## (undated) DEVICE — ELECTRODE BLDE L4IN NONINSULATED EDGE

## (undated) DEVICE — HEX-LOCKING BLADE ELECTRODE: Brand: EDGE

## (undated) DEVICE — TOWEL SURG W16XL26IN BLU NONFENESTRATED DLX ST 2 PER PK

## (undated) DEVICE — DRAPE SHEET, X-LARGE: Brand: CONVERTORS

## (undated) DEVICE — SOL IRRIGATION INJ NACL 0.9% 500ML BTL

## (undated) DEVICE — GOWN,PREVENTION PLUS, 4XL,STERILE: Brand: MEDLINE

## (undated) DEVICE — TOWEL: OR BLU 80/CS: Brand: MEDICAL ACTION INDUSTRIES

## (undated) DEVICE — SYRINGE BLB 50CC IRRIG PLIABLE FNGR FLNG GRAD FLSK DISP

## (undated) DEVICE — 10FR FRAZIER SUCTION HANDLE: Brand: CARDINAL HEALTH

## (undated) DEVICE — TOOL MC30 LEGEND 9CM 3.0MM CARBIDE: Brand: MIDAS REX™

## (undated) DEVICE — SPINE PACK DEPAUL: Brand: MEDLINE INDUSTRIES, INC.

## (undated) DEVICE — GAUZE,SPONGE,8"X4",12PLY,XRAY,STRL,LF: Brand: MEDLINE

## (undated) DEVICE — (D)SYR 10ML 1/5ML GRAD NSAF -- PKGING CHANGE USE ITEM 338027

## (undated) DEVICE — ABDOMINAL PAD: Brand: DERMACEA

## (undated) DEVICE — TRAY CATH 16F URIN MTR LTX -- CONVERT TO ITEM 363111

## (undated) DEVICE — 3M™ IOBAN™ 2 ANTIMICROBIAL INCISE DRAPE 6650EZ: Brand: IOBAN™ 2

## (undated) DEVICE — REM POLYHESIVE ADULT PATIENT RETURN ELECTRODE: Brand: VALLEYLAB

## (undated) DEVICE — SYSTEM SKIN CLSR 22CM DERMBND PRINEO